# Patient Record
Sex: MALE | Race: WHITE | NOT HISPANIC OR LATINO | Employment: OTHER | URBAN - METROPOLITAN AREA
[De-identification: names, ages, dates, MRNs, and addresses within clinical notes are randomized per-mention and may not be internally consistent; named-entity substitution may affect disease eponyms.]

---

## 2017-09-21 ENCOUNTER — HOSPITAL ENCOUNTER (OUTPATIENT)
Dept: OTHER | Facility: HOSPITAL | Age: 82
Setting detail: SPECIMEN
Discharge: HOME | End: 2017-09-21
Attending: FAMILY MEDICINE | Admitting: FAMILY MEDICINE

## 2017-09-21 LAB
ALBUMIN SERPL-MCNC: 3.6 G/DL (ref 3.5–4.8)
ALBUMIN/GLOB SERPL: 1.1 {RATIO} (ref 1–1.7)
ALP SERPL-CCNC: 76 IU/L (ref 32–91)
ALT SERPL-CCNC: 20 IU/L (ref 17–63)
ANION GAP SERPL CALC-SCNC: 11.6 MMOL/L (ref 10–20)
AST SERPL-CCNC: 32 IU/L (ref 15–41)
BASOPHILS # BLD AUTO: 0 10*3/UL (ref 0–0.2)
BASOPHILS NFR BLD AUTO: 1 % (ref 0–2)
BILIRUB SERPL-MCNC: 2.3 MG/DL (ref 0.3–1.2)
BUN SERPL-MCNC: 17 MG/DL (ref 8–20)
BUN/CREAT SERPL: 15.5 (ref 6.2–20.3)
CALCIUM SERPL-MCNC: 9.4 MG/DL (ref 8.9–10.3)
CHLORIDE SERPL-SCNC: 105 MMOL/L (ref 101–111)
CHOLEST SERPL-MCNC: 128 MG/DL
CHOLEST/HDLC SERPL: 2.9 {RATIO}
CONV CO2: 28 MMOL/L (ref 22–32)
CONV LDL CHOLESTEROL DIRECT: 72 MG/DL (ref 0–100)
CONV TOTAL PROTEIN: 6.8 G/DL (ref 6.1–7.9)
CREAT UR-MCNC: 1.1 MG/DL (ref 0.7–1.2)
DIFFERENTIAL METHOD BLD: (no result)
EOSINOPHIL # BLD AUTO: 0.1 10*3/UL (ref 0–0.3)
EOSINOPHIL # BLD AUTO: 2 % (ref 0–3)
ERYTHROCYTE [DISTWIDTH] IN BLOOD BY AUTOMATED COUNT: 15 % (ref 11.5–14.5)
GLOBULIN UR ELPH-MCNC: 3.2 G/DL (ref 2.5–3.8)
GLUCOSE SERPL-MCNC: 84 MG/DL (ref 65–99)
HCT VFR BLD AUTO: 44.7 % (ref 40–54)
HDLC SERPL-MCNC: 45 MG/DL
HGB BLD-MCNC: 14.8 G/DL (ref 14–18)
LDLC/HDLC SERPL: 1.6 {RATIO}
LIPID INTERPRETATION: NORMAL
LYMPHOCYTES # BLD AUTO: 1.7 10*3/UL (ref 0.8–4.8)
LYMPHOCYTES NFR BLD AUTO: 34 % (ref 18–42)
MCH RBC QN AUTO: 30.1 PG (ref 26–32)
MCHC RBC AUTO-ENTMCNC: 33 G/DL (ref 32–36)
MCV RBC AUTO: 91.3 FL (ref 80–94)
MONOCYTES # BLD AUTO: 0.5 10*3/UL (ref 0.1–1.3)
MONOCYTES NFR BLD AUTO: 10 % (ref 2–11)
NEUTROPHILS # BLD AUTO: 2.7 10*3/UL (ref 2.3–8.6)
NEUTROPHILS NFR BLD AUTO: 53 % (ref 50–75)
NRBC BLD AUTO-RTO: 0 /100{WBCS}
NRBC/RBC NFR BLD MANUAL: 0 10*3/UL
PLATELET # BLD AUTO: 150 10*3/UL (ref 150–450)
PMV BLD AUTO: 8.2 FL (ref 7.4–10.4)
POTASSIUM SERPL-SCNC: 4.6 MMOL/L (ref 3.6–5.1)
RBC # BLD AUTO: 4.9 10*6/UL (ref 4.6–6)
SODIUM SERPL-SCNC: 140 MMOL/L (ref 136–144)
TRIGL SERPL-MCNC: 43 MG/DL
TSH SERPL-ACNC: 3.93 UIU/ML (ref 0.34–5.6)
VLDLC SERPL CALC-MCNC: 10.9 MG/DL
WBC # BLD AUTO: 5 10*3/UL (ref 4.5–11.5)

## 2018-03-13 ENCOUNTER — HOSPITAL ENCOUNTER (OUTPATIENT)
Dept: OTHER | Facility: HOSPITAL | Age: 83
Setting detail: SPECIMEN
Discharge: HOME | End: 2018-03-13
Attending: FAMILY MEDICINE | Admitting: FAMILY MEDICINE

## 2018-08-28 ENCOUNTER — HOSPITAL ENCOUNTER (OUTPATIENT)
Dept: OTHER | Facility: HOSPITAL | Age: 83
Setting detail: SPECIMEN
Discharge: HOME | End: 2018-08-28
Attending: FAMILY MEDICINE | Admitting: FAMILY MEDICINE

## 2018-10-17 ENCOUNTER — HOSPITAL ENCOUNTER (OUTPATIENT)
Dept: OTHER | Facility: HOSPITAL | Age: 83
Setting detail: SPECIMEN
Discharge: HOME | End: 2018-10-17
Attending: FAMILY MEDICINE | Admitting: FAMILY MEDICINE

## 2018-10-17 LAB
ALBUMIN SERPL-MCNC: 3.5 G/DL (ref 3.5–4.8)
ALBUMIN/GLOB SERPL: 1 {RATIO} (ref 1–1.7)
ALP SERPL-CCNC: 97 IU/L (ref 32–91)
ALT SERPL-CCNC: 19 IU/L (ref 17–63)
ANION GAP SERPL CALC-SCNC: 13.8 MMOL/L (ref 10–20)
AST SERPL-CCNC: 32 IU/L (ref 15–41)
BASOPHILS # BLD AUTO: 0 10*3/UL (ref 0–0.2)
BASOPHILS NFR BLD AUTO: 1 % (ref 0–2)
BILIRUB SERPL-MCNC: 1.9 MG/DL (ref 0.3–1.2)
BUN SERPL-MCNC: 20 MG/DL (ref 8–20)
BUN/CREAT SERPL: 16.7 (ref 6.2–20.3)
CALCIUM SERPL-MCNC: 9.3 MG/DL (ref 8.9–10.3)
CHLORIDE SERPL-SCNC: 101 MMOL/L (ref 101–111)
CHOLEST SERPL-MCNC: 113 MG/DL
CHOLEST/HDLC SERPL: 2.6 {RATIO}
CONV CO2: 28 MMOL/L (ref 22–32)
CONV LDL CHOLESTEROL DIRECT: 61 MG/DL (ref 0–100)
CONV TOTAL PROTEIN: 6.9 G/DL (ref 6.1–7.9)
CREAT UR-MCNC: 1.2 MG/DL (ref 0.7–1.2)
DIFFERENTIAL METHOD BLD: (no result)
EOSINOPHIL # BLD AUTO: 0.1 10*3/UL (ref 0–0.3)
EOSINOPHIL # BLD AUTO: 1 % (ref 0–3)
ERYTHROCYTE [DISTWIDTH] IN BLOOD BY AUTOMATED COUNT: 15 % (ref 11.5–14.5)
GLOBULIN UR ELPH-MCNC: 3.4 G/DL (ref 2.5–3.8)
GLUCOSE SERPL-MCNC: 92 MG/DL (ref 65–99)
HCT VFR BLD AUTO: 41.5 % (ref 40–54)
HDLC SERPL-MCNC: 44 MG/DL
HGB BLD-MCNC: 14.1 G/DL (ref 14–18)
LDLC/HDLC SERPL: 1.4 {RATIO}
LIPID INTERPRETATION: NORMAL
LYMPHOCYTES # BLD AUTO: 1.5 10*3/UL (ref 0.8–4.8)
LYMPHOCYTES NFR BLD AUTO: 31 % (ref 18–42)
MCH RBC QN AUTO: 31 PG (ref 26–32)
MCHC RBC AUTO-ENTMCNC: 34 G/DL (ref 32–36)
MCV RBC AUTO: 91 FL (ref 80–94)
MONOCYTES # BLD AUTO: 0.5 10*3/UL (ref 0.1–1.3)
MONOCYTES NFR BLD AUTO: 11 % (ref 2–11)
NEUTROPHILS # BLD AUTO: 2.7 10*3/UL (ref 2.3–8.6)
NEUTROPHILS NFR BLD AUTO: 56 % (ref 50–75)
NRBC BLD AUTO-RTO: 0 /100{WBCS}
NRBC/RBC NFR BLD MANUAL: 0 10*3/UL
PLATELET # BLD AUTO: 165 10*3/UL (ref 150–450)
PMV BLD AUTO: 8.1 FL (ref 7.4–10.4)
POTASSIUM SERPL-SCNC: 4.8 MMOL/L (ref 3.6–5.1)
RBC # BLD AUTO: 4.57 10*6/UL (ref 4.6–6)
SODIUM SERPL-SCNC: 138 MMOL/L (ref 136–144)
TRIGL SERPL-MCNC: 37 MG/DL
TSH SERPL-ACNC: 4.86 UIU/ML (ref 0.34–5.6)
VLDLC SERPL CALC-MCNC: 7.8 MG/DL
WBC # BLD AUTO: 4.8 10*3/UL (ref 4.5–11.5)

## 2019-03-22 ENCOUNTER — HOSPITAL ENCOUNTER (OUTPATIENT)
Dept: OTHER | Facility: HOSPITAL | Age: 84
Discharge: HOME | End: 2019-03-22
Attending: NURSE PRACTITIONER | Admitting: NURSE PRACTITIONER

## 2019-07-02 ENCOUNTER — ANTICOAGULATION VISIT (OUTPATIENT)
Dept: CARDIOLOGY | Facility: CLINIC | Age: 84
End: 2019-07-02

## 2019-07-02 DIAGNOSIS — I48.91 ATRIAL FIBRILLATION, UNSPECIFIED TYPE (HCC): Primary | ICD-10-CM

## 2019-07-02 LAB — INR PPP: 2.2 (ref 0.9–1.1)

## 2019-07-02 PROCEDURE — 36416 COLLJ CAPILLARY BLOOD SPEC: CPT | Performed by: INTERNAL MEDICINE

## 2019-07-02 PROCEDURE — 85610 PROTHROMBIN TIME: CPT | Performed by: INTERNAL MEDICINE

## 2019-07-16 ENCOUNTER — ANTICOAGULATION VISIT (OUTPATIENT)
Dept: CARDIOLOGY | Facility: CLINIC | Age: 84
End: 2019-07-16

## 2019-07-16 DIAGNOSIS — I48.91 ATRIAL FIBRILLATION, UNSPECIFIED TYPE (HCC): Primary | ICD-10-CM

## 2019-07-16 LAB — INR PPP: 2.3 (ref 0.9–1.1)

## 2019-07-16 PROCEDURE — 36416 COLLJ CAPILLARY BLOOD SPEC: CPT | Performed by: INTERNAL MEDICINE

## 2019-07-16 PROCEDURE — 85610 PROTHROMBIN TIME: CPT | Performed by: INTERNAL MEDICINE

## 2019-08-20 ENCOUNTER — ANTICOAGULATION VISIT (OUTPATIENT)
Dept: CARDIOLOGY | Facility: CLINIC | Age: 84
End: 2019-08-20

## 2019-08-20 DIAGNOSIS — I48.91 ATRIAL FIBRILLATION, UNSPECIFIED TYPE (HCC): Primary | ICD-10-CM

## 2019-08-20 LAB — INR PPP: 1.7 (ref 0.9–1.1)

## 2019-08-20 PROCEDURE — 85610 PROTHROMBIN TIME: CPT | Performed by: INTERNAL MEDICINE

## 2019-08-20 PROCEDURE — 36416 COLLJ CAPILLARY BLOOD SPEC: CPT | Performed by: INTERNAL MEDICINE

## 2019-08-23 RX ORDER — FUROSEMIDE 40 MG/1
TABLET ORAL
Qty: 30 TABLET | Refills: 1 | Status: SHIPPED | OUTPATIENT
Start: 2019-08-23 | End: 2019-10-10

## 2019-09-25 ENCOUNTER — LAB REQUISITION (OUTPATIENT)
Dept: LAB | Facility: HOSPITAL | Age: 84
End: 2019-09-25

## 2019-09-25 DIAGNOSIS — Z00.00 ENCOUNTER FOR GENERAL ADULT MEDICAL EXAMINATION WITHOUT ABNORMAL FINDINGS: ICD-10-CM

## 2019-09-25 LAB
AMORPH URATE CRY URNS QL MICRO: ABNORMAL /HPF
ANION GAP SERPL CALCULATED.3IONS-SCNC: 14.3 MMOL/L (ref 5–15)
BACTERIA UR QL AUTO: ABNORMAL /HPF
BASOPHILS # BLD AUTO: 0 10*3/MM3 (ref 0–0.2)
BASOPHILS NFR BLD AUTO: 0.3 % (ref 0–1.5)
BILIRUB UR QL STRIP: ABNORMAL
BUN BLD-MCNC: 22 MG/DL (ref 8–20)
BUN/CREAT SERPL: 20 (ref 6.2–20.3)
CALCIUM SPEC-SCNC: 9.7 MG/DL (ref 8.9–10.3)
CHLORIDE SERPL-SCNC: 107 MMOL/L (ref 101–111)
CLARITY UR: ABNORMAL
CO2 SERPL-SCNC: 25 MMOL/L (ref 22–32)
COD CRY URNS QL: ABNORMAL /HPF
COLOR UR: ABNORMAL
CREAT BLD-MCNC: 1.1 MG/DL (ref 0.7–1.2)
DEPRECATED RDW RBC AUTO: 49 FL (ref 37–54)
EOSINOPHIL # BLD AUTO: 0.1 10*3/MM3 (ref 0–0.4)
EOSINOPHIL NFR BLD AUTO: 1.2 % (ref 0.3–6.2)
ERYTHROCYTE [DISTWIDTH] IN BLOOD BY AUTOMATED COUNT: 15 % (ref 12.3–15.4)
GFR SERPL CREATININE-BSD FRML MDRD: 62 ML/MIN/1.73
GLUCOSE BLD-MCNC: 99 MG/DL (ref 65–99)
GLUCOSE UR STRIP-MCNC: NEGATIVE MG/DL
HCT VFR BLD AUTO: 47.3 % (ref 37.5–51)
HGB BLD-MCNC: 16.1 G/DL (ref 13–17.7)
HGB UR QL STRIP.AUTO: ABNORMAL
HYALINE CASTS UR QL AUTO: ABNORMAL /LPF
KETONES UR QL STRIP: ABNORMAL
LEUKOCYTE ESTERASE UR QL STRIP.AUTO: ABNORMAL
LYMPHOCYTES # BLD AUTO: 2.1 10*3/MM3 (ref 0.7–3.1)
LYMPHOCYTES NFR BLD AUTO: 30.7 % (ref 19.6–45.3)
MCH RBC QN AUTO: 31.4 PG (ref 26.6–33)
MCHC RBC AUTO-ENTMCNC: 34 G/DL (ref 31.5–35.7)
MCV RBC AUTO: 92.5 FL (ref 79–97)
MONOCYTES # BLD AUTO: 0.6 10*3/MM3 (ref 0.1–0.9)
MONOCYTES NFR BLD AUTO: 9.2 % (ref 5–12)
MUCOUS THREADS URNS QL MICRO: ABNORMAL /HPF
NEUTROPHILS # BLD AUTO: 4 10*3/MM3 (ref 1.7–7)
NEUTROPHILS NFR BLD AUTO: 58.6 % (ref 42.7–76)
NITRITE UR QL STRIP: NEGATIVE
NRBC BLD AUTO-RTO: 0.1 /100 WBC (ref 0–0.2)
PH UR STRIP.AUTO: 5.5 [PH] (ref 5–8)
PLATELET # BLD AUTO: 261 10*3/MM3 (ref 140–450)
PMV BLD AUTO: 7.5 FL (ref 6–12)
POTASSIUM BLD-SCNC: 4.3 MMOL/L (ref 3.6–5.1)
PROT UR QL STRIP: ABNORMAL
RBC # BLD AUTO: 5.11 10*6/MM3 (ref 4.14–5.8)
RBC # UR: ABNORMAL /HPF
REF LAB TEST METHOD: ABNORMAL
SODIUM BLD-SCNC: 142 MMOL/L (ref 136–144)
SP GR UR STRIP: 1.02 (ref 1–1.03)
SQUAMOUS #/AREA URNS HPF: ABNORMAL /HPF
UROBILINOGEN UR QL STRIP: ABNORMAL
WBC NRBC COR # BLD: 6.9 10*3/MM3 (ref 3.4–10.8)
WBC UR QL AUTO: ABNORMAL /HPF

## 2019-09-25 PROCEDURE — 81001 URINALYSIS AUTO W/SCOPE: CPT

## 2019-09-25 PROCEDURE — 80048 BASIC METABOLIC PNL TOTAL CA: CPT

## 2019-09-25 PROCEDURE — 85025 COMPLETE CBC W/AUTO DIFF WBC: CPT

## 2019-09-26 ENCOUNTER — LAB REQUISITION (OUTPATIENT)
Dept: LAB | Facility: HOSPITAL | Age: 84
End: 2019-09-26

## 2019-09-26 DIAGNOSIS — G81.90 HEMIPLEGIA (HCC): ICD-10-CM

## 2019-09-26 DIAGNOSIS — I67.9 CEREBROVASCULAR DISEASE: ICD-10-CM

## 2019-09-26 LAB
ANION GAP SERPL CALCULATED.3IONS-SCNC: 12.3 MMOL/L (ref 5–15)
BASOPHILS # BLD AUTO: 0 10*3/MM3 (ref 0–0.2)
BASOPHILS NFR BLD AUTO: 0.4 % (ref 0–1.5)
BUN BLD-MCNC: 21 MG/DL (ref 8–20)
BUN/CREAT SERPL: 17.5 (ref 6.2–20.3)
CALCIUM SPEC-SCNC: 9 MG/DL (ref 8.9–10.3)
CHLORIDE SERPL-SCNC: 105 MMOL/L (ref 101–111)
CO2 SERPL-SCNC: 28 MMOL/L (ref 22–32)
CREAT BLD-MCNC: 1.2 MG/DL (ref 0.7–1.2)
DEPRECATED RDW RBC AUTO: 47.3 FL (ref 37–54)
EOSINOPHIL # BLD AUTO: 0.1 10*3/MM3 (ref 0–0.4)
EOSINOPHIL NFR BLD AUTO: 1.7 % (ref 0.3–6.2)
ERYTHROCYTE [DISTWIDTH] IN BLOOD BY AUTOMATED COUNT: 14.8 % (ref 12.3–15.4)
GFR SERPL CREATININE-BSD FRML MDRD: 56 ML/MIN/1.73
GLUCOSE BLD-MCNC: 101 MG/DL (ref 65–99)
HCT VFR BLD AUTO: 42.3 % (ref 37.5–51)
HGB BLD-MCNC: 14.2 G/DL (ref 13–17.7)
LYMPHOCYTES # BLD AUTO: 1.2 10*3/MM3 (ref 0.7–3.1)
LYMPHOCYTES NFR BLD AUTO: 22.2 % (ref 19.6–45.3)
MCH RBC QN AUTO: 30.8 PG (ref 26.6–33)
MCHC RBC AUTO-ENTMCNC: 33.5 G/DL (ref 31.5–35.7)
MCV RBC AUTO: 92.1 FL (ref 79–97)
MONOCYTES # BLD AUTO: 0.5 10*3/MM3 (ref 0.1–0.9)
MONOCYTES NFR BLD AUTO: 9.6 % (ref 5–12)
NEUTROPHILS # BLD AUTO: 3.5 10*3/MM3 (ref 1.7–7)
NEUTROPHILS NFR BLD AUTO: 66.1 % (ref 42.7–76)
NRBC BLD AUTO-RTO: 0.1 /100 WBC (ref 0–0.2)
PLATELET # BLD AUTO: 237 10*3/MM3 (ref 140–450)
PMV BLD AUTO: 7.3 FL (ref 6–12)
POTASSIUM BLD-SCNC: 4.3 MMOL/L (ref 3.6–5.1)
RBC # BLD AUTO: 4.59 10*6/MM3 (ref 4.14–5.8)
SODIUM BLD-SCNC: 141 MMOL/L (ref 136–144)
WBC NRBC COR # BLD: 5.3 10*3/MM3 (ref 3.4–10.8)

## 2019-09-26 PROCEDURE — 85025 COMPLETE CBC W/AUTO DIFF WBC: CPT

## 2019-09-26 PROCEDURE — 80048 BASIC METABOLIC PNL TOTAL CA: CPT

## 2019-09-27 ENCOUNTER — LAB REQUISITION (OUTPATIENT)
Dept: LAB | Facility: HOSPITAL | Age: 84
End: 2019-09-27

## 2019-09-27 DIAGNOSIS — I67.9 CEREBROVASCULAR DISEASE: ICD-10-CM

## 2019-09-27 LAB
ANION GAP SERPL CALCULATED.3IONS-SCNC: 12.7 MMOL/L (ref 5–15)
BUN BLD-MCNC: 45 MG/DL (ref 8–20)
BUN/CREAT SERPL: 25 (ref 6.2–20.3)
CALCIUM SPEC-SCNC: 8.5 MG/DL (ref 8.9–10.3)
CHLORIDE SERPL-SCNC: 107 MMOL/L (ref 101–111)
CO2 SERPL-SCNC: 21 MMOL/L (ref 22–32)
CREAT BLD-MCNC: 1.8 MG/DL (ref 0.7–1.2)
GFR SERPL CREATININE-BSD FRML MDRD: 35 ML/MIN/1.73
GLUCOSE BLD-MCNC: 129 MG/DL (ref 65–99)
POTASSIUM BLD-SCNC: 4.7 MMOL/L (ref 3.6–5.1)
SODIUM BLD-SCNC: 136 MMOL/L (ref 136–144)

## 2019-09-27 PROCEDURE — 80048 BASIC METABOLIC PNL TOTAL CA: CPT

## 2019-09-28 ENCOUNTER — LAB REQUISITION (OUTPATIENT)
Dept: LAB | Facility: HOSPITAL | Age: 84
End: 2019-09-28

## 2019-09-28 DIAGNOSIS — I63.9 CEREBRAL INFARCTION (HCC): ICD-10-CM

## 2019-09-28 LAB
ALBUMIN SERPL-MCNC: 2.6 G/DL (ref 3.5–4.8)
ALBUMIN/GLOB SERPL: 0.8 G/DL (ref 1–1.7)
ALP SERPL-CCNC: 73 U/L (ref 32–91)
ALT SERPL W P-5'-P-CCNC: 19 U/L (ref 17–63)
ANION GAP SERPL CALCULATED.3IONS-SCNC: 13.1 MMOL/L (ref 5–15)
AST SERPL-CCNC: 30 U/L (ref 15–41)
BILIRUB SERPL-MCNC: 2.6 MG/DL (ref 0.3–1.2)
BUN BLD-MCNC: 22 MG/DL (ref 8–20)
BUN/CREAT SERPL: 16.9 (ref 6.2–20.3)
CALCIUM SPEC-SCNC: 9.2 MG/DL (ref 8.9–10.3)
CHLORIDE SERPL-SCNC: 102 MMOL/L (ref 101–111)
CO2 SERPL-SCNC: 28 MMOL/L (ref 22–32)
CREAT BLD-MCNC: 1.3 MG/DL (ref 0.7–1.2)
GFR SERPL CREATININE-BSD FRML MDRD: 51 ML/MIN/1.73
GLOBULIN UR ELPH-MCNC: 3.4 GM/DL (ref 2.5–3.8)
GLUCOSE BLD-MCNC: 104 MG/DL (ref 65–99)
POTASSIUM BLD-SCNC: 4.1 MMOL/L (ref 3.6–5.1)
PROT SERPL-MCNC: 6 G/DL (ref 6.1–7.9)
SODIUM BLD-SCNC: 139 MMOL/L (ref 136–144)

## 2019-09-28 PROCEDURE — 80053 COMPREHEN METABOLIC PANEL: CPT | Performed by: INTERNAL MEDICINE

## 2019-09-29 ENCOUNTER — LAB REQUISITION (OUTPATIENT)
Dept: LAB | Facility: HOSPITAL | Age: 84
End: 2019-09-29

## 2019-09-29 DIAGNOSIS — I67.9 CEREBROVASCULAR DISEASE: ICD-10-CM

## 2019-09-29 LAB
BASOPHILS # BLD AUTO: 0 10*3/MM3 (ref 0–0.2)
BASOPHILS NFR BLD AUTO: 0.2 % (ref 0–1.5)
DEPRECATED RDW RBC AUTO: 47.7 FL (ref 37–54)
EOSINOPHIL # BLD AUTO: 0.1 10*3/MM3 (ref 0–0.4)
EOSINOPHIL NFR BLD AUTO: 0.4 % (ref 0.3–6.2)
ERYTHROCYTE [DISTWIDTH] IN BLOOD BY AUTOMATED COUNT: 14.9 % (ref 12.3–15.4)
HCT VFR BLD AUTO: 42.5 % (ref 37.5–51)
HGB BLD-MCNC: 14.1 G/DL (ref 13–17.7)
LYMPHOCYTES # BLD AUTO: 1.4 10*3/MM3 (ref 0.7–3.1)
LYMPHOCYTES NFR BLD AUTO: 8.8 % (ref 19.6–45.3)
MCH RBC QN AUTO: 30.6 PG (ref 26.6–33)
MCHC RBC AUTO-ENTMCNC: 33.3 G/DL (ref 31.5–35.7)
MCV RBC AUTO: 91.9 FL (ref 79–97)
MONOCYTES # BLD AUTO: 1 10*3/MM3 (ref 0.1–0.9)
MONOCYTES NFR BLD AUTO: 6.3 % (ref 5–12)
NEUTROPHILS # BLD AUTO: 13.8 10*3/MM3 (ref 1.7–7)
NEUTROPHILS NFR BLD AUTO: 84.3 % (ref 42.7–76)
NRBC BLD AUTO-RTO: 0 /100 WBC (ref 0–0.2)
PLATELET # BLD AUTO: 199 10*3/MM3 (ref 140–450)
PMV BLD AUTO: 8 FL (ref 6–12)
RBC # BLD AUTO: 4.63 10*6/MM3 (ref 4.14–5.8)
WBC NRBC COR # BLD: 16.3 10*3/MM3 (ref 3.4–10.8)

## 2019-09-29 PROCEDURE — 85025 COMPLETE CBC W/AUTO DIFF WBC: CPT | Performed by: INTERNAL MEDICINE

## 2019-10-01 ENCOUNTER — LAB REQUISITION (OUTPATIENT)
Dept: LAB | Facility: HOSPITAL | Age: 84
End: 2019-10-01

## 2019-10-01 DIAGNOSIS — I63.9 CEREBRAL INFARCTION, UNSPECIFIED (HCC): ICD-10-CM

## 2019-10-01 LAB
ANION GAP SERPL CALCULATED.3IONS-SCNC: 11 MMOL/L (ref 5–15)
BASOPHILS # BLD AUTO: 0 10*3/MM3 (ref 0–0.2)
BASOPHILS NFR BLD AUTO: 0.3 % (ref 0–1.5)
BUN BLD-MCNC: 23 MG/DL (ref 8–20)
BUN/CREAT SERPL: 20.9 (ref 6.2–20.3)
CALCIUM SPEC-SCNC: 9 MG/DL (ref 8.9–10.3)
CHLORIDE SERPL-SCNC: 105 MMOL/L (ref 101–111)
CO2 SERPL-SCNC: 26 MMOL/L (ref 22–32)
CREAT BLD-MCNC: 1.1 MG/DL (ref 0.7–1.2)
DEPRECATED RDW RBC AUTO: 48.6 FL (ref 37–54)
EOSINOPHIL # BLD AUTO: 0.1 10*3/MM3 (ref 0–0.4)
EOSINOPHIL NFR BLD AUTO: 1.2 % (ref 0.3–6.2)
ERYTHROCYTE [DISTWIDTH] IN BLOOD BY AUTOMATED COUNT: 15 % (ref 12.3–15.4)
GFR SERPL CREATININE-BSD FRML MDRD: 62 ML/MIN/1.73
GLUCOSE BLD-MCNC: 99 MG/DL (ref 65–99)
HCT VFR BLD AUTO: 42.2 % (ref 37.5–51)
HGB BLD-MCNC: 14 G/DL (ref 13–17.7)
LYMPHOCYTES # BLD AUTO: 1 10*3/MM3 (ref 0.7–3.1)
LYMPHOCYTES NFR BLD AUTO: 13.8 % (ref 19.6–45.3)
MCH RBC QN AUTO: 30.3 PG (ref 26.6–33)
MCHC RBC AUTO-ENTMCNC: 33.1 G/DL (ref 31.5–35.7)
MCV RBC AUTO: 91.6 FL (ref 79–97)
MONOCYTES # BLD AUTO: 0.8 10*3/MM3 (ref 0.1–0.9)
MONOCYTES NFR BLD AUTO: 10.7 % (ref 5–12)
NEUTROPHILS # BLD AUTO: 5.2 10*3/MM3 (ref 1.7–7)
NEUTROPHILS NFR BLD AUTO: 74 % (ref 42.7–76)
NRBC BLD AUTO-RTO: 0.1 /100 WBC (ref 0–0.2)
PLATELET # BLD AUTO: 229 10*3/MM3 (ref 140–450)
PMV BLD AUTO: 7.8 FL (ref 6–12)
POTASSIUM BLD-SCNC: 4 MMOL/L (ref 3.6–5.1)
RBC # BLD AUTO: 4.6 10*6/MM3 (ref 4.14–5.8)
SODIUM BLD-SCNC: 138 MMOL/L (ref 136–144)
WBC NRBC COR # BLD: 7.1 10*3/MM3 (ref 3.4–10.8)

## 2019-10-01 PROCEDURE — 85025 COMPLETE CBC W/AUTO DIFF WBC: CPT | Performed by: INTERNAL MEDICINE

## 2019-10-01 PROCEDURE — 80048 BASIC METABOLIC PNL TOTAL CA: CPT | Performed by: INTERNAL MEDICINE

## 2019-10-04 ENCOUNTER — LAB REQUISITION (OUTPATIENT)
Dept: LAB | Facility: HOSPITAL | Age: 84
End: 2019-10-04

## 2019-10-04 LAB
ANION GAP SERPL CALCULATED.3IONS-SCNC: 17.2 MMOL/L (ref 5–15)
BASOPHILS # BLD AUTO: 0 10*3/MM3 (ref 0–0.2)
BASOPHILS NFR BLD AUTO: 0.5 % (ref 0–1.5)
BUN BLD-MCNC: 21 MG/DL (ref 8–20)
BUN/CREAT SERPL: 17.5 (ref 6.2–20.3)
CALCIUM SPEC-SCNC: 10.1 MG/DL (ref 8.9–10.3)
CHLORIDE SERPL-SCNC: 103 MMOL/L (ref 101–111)
CO2 SERPL-SCNC: 26 MMOL/L (ref 22–32)
CREAT BLD-MCNC: 1.2 MG/DL (ref 0.7–1.2)
DEPRECATED RDW RBC AUTO: 49.9 FL (ref 37–54)
EOSINOPHIL # BLD AUTO: 0 10*3/MM3 (ref 0–0.4)
EOSINOPHIL NFR BLD AUTO: 0.6 % (ref 0.3–6.2)
ERYTHROCYTE [DISTWIDTH] IN BLOOD BY AUTOMATED COUNT: 15.3 % (ref 12.3–15.4)
GFR SERPL CREATININE-BSD FRML MDRD: 56 ML/MIN/1.73
GLUCOSE BLD-MCNC: 117 MG/DL (ref 65–99)
HCT VFR BLD AUTO: 50.7 % (ref 37.5–51)
HGB BLD-MCNC: 16.9 G/DL (ref 13–17.7)
LYMPHOCYTES # BLD AUTO: 1.3 10*3/MM3 (ref 0.7–3.1)
LYMPHOCYTES NFR BLD AUTO: 16.9 % (ref 19.6–45.3)
MCH RBC QN AUTO: 31.2 PG (ref 26.6–33)
MCHC RBC AUTO-ENTMCNC: 33.3 G/DL (ref 31.5–35.7)
MCV RBC AUTO: 93.7 FL (ref 79–97)
MONOCYTES # BLD AUTO: 0.7 10*3/MM3 (ref 0.1–0.9)
MONOCYTES NFR BLD AUTO: 9.6 % (ref 5–12)
NEUTROPHILS # BLD AUTO: 5.6 10*3/MM3 (ref 1.7–7)
NEUTROPHILS NFR BLD AUTO: 72.4 % (ref 42.7–76)
NRBC BLD AUTO-RTO: 0.3 /100 WBC (ref 0–0.2)
PLATELET # BLD AUTO: 274 10*3/MM3 (ref 140–450)
PMV BLD AUTO: 7.8 FL (ref 6–12)
POTASSIUM BLD-SCNC: 5.2 MMOL/L (ref 3.6–5.1)
RBC # BLD AUTO: 5.42 10*6/MM3 (ref 4.14–5.8)
SODIUM BLD-SCNC: 141 MMOL/L (ref 136–144)
WBC NRBC COR # BLD: 7.7 10*3/MM3 (ref 3.4–10.8)

## 2019-10-04 PROCEDURE — 85025 COMPLETE CBC W/AUTO DIFF WBC: CPT

## 2019-10-04 PROCEDURE — 80048 BASIC METABOLIC PNL TOTAL CA: CPT

## 2019-10-07 ENCOUNTER — LAB REQUISITION (OUTPATIENT)
Dept: LAB | Facility: HOSPITAL | Age: 84
End: 2019-10-07

## 2019-10-07 DIAGNOSIS — Z00.00 ENCOUNTER FOR GENERAL ADULT MEDICAL EXAMINATION WITHOUT ABNORMAL FINDINGS: ICD-10-CM

## 2019-10-07 LAB
ANION GAP SERPL CALCULATED.3IONS-SCNC: 11.2 MMOL/L (ref 5–15)
BASOPHILS # BLD AUTO: 0 10*3/MM3 (ref 0–0.2)
BASOPHILS NFR BLD AUTO: 0.5 % (ref 0–1.5)
BUN BLD-MCNC: 23 MG/DL (ref 8–20)
BUN/CREAT SERPL: 19.2 (ref 6.2–20.3)
CALCIUM SPEC-SCNC: 9.5 MG/DL (ref 8.9–10.3)
CHLORIDE SERPL-SCNC: 110 MMOL/L (ref 101–111)
CO2 SERPL-SCNC: 28 MMOL/L (ref 22–32)
CREAT BLD-MCNC: 1.2 MG/DL (ref 0.7–1.2)
DEPRECATED RDW RBC AUTO: 49 FL (ref 37–54)
EOSINOPHIL # BLD AUTO: 0.1 10*3/MM3 (ref 0–0.4)
EOSINOPHIL NFR BLD AUTO: 1.5 % (ref 0.3–6.2)
ERYTHROCYTE [DISTWIDTH] IN BLOOD BY AUTOMATED COUNT: 15.1 % (ref 12.3–15.4)
GFR SERPL CREATININE-BSD FRML MDRD: 56 ML/MIN/1.73
GLUCOSE BLD-MCNC: 125 MG/DL (ref 65–99)
HCT VFR BLD AUTO: 45.5 % (ref 37.5–51)
HGB BLD-MCNC: 15.1 G/DL (ref 13–17.7)
LYMPHOCYTES # BLD AUTO: 1.2 10*3/MM3 (ref 0.7–3.1)
LYMPHOCYTES NFR BLD AUTO: 21 % (ref 19.6–45.3)
MCH RBC QN AUTO: 30.7 PG (ref 26.6–33)
MCHC RBC AUTO-ENTMCNC: 33.2 G/DL (ref 31.5–35.7)
MCV RBC AUTO: 92.6 FL (ref 79–97)
MONOCYTES # BLD AUTO: 0.5 10*3/MM3 (ref 0.1–0.9)
MONOCYTES NFR BLD AUTO: 7.9 % (ref 5–12)
NEUTROPHILS # BLD AUTO: 4.1 10*3/MM3 (ref 1.7–7)
NEUTROPHILS NFR BLD AUTO: 69.1 % (ref 42.7–76)
NRBC BLD AUTO-RTO: 0.1 /100 WBC (ref 0–0.2)
PLATELET # BLD AUTO: 219 10*3/MM3 (ref 140–450)
PMV BLD AUTO: 7.8 FL (ref 6–12)
POTASSIUM BLD-SCNC: 4.2 MMOL/L (ref 3.6–5.1)
RBC # BLD AUTO: 4.91 10*6/MM3 (ref 4.14–5.8)
SODIUM BLD-SCNC: 145 MMOL/L (ref 136–144)
WBC NRBC COR # BLD: 5.9 10*3/MM3 (ref 3.4–10.8)

## 2019-10-07 PROCEDURE — 80048 BASIC METABOLIC PNL TOTAL CA: CPT

## 2019-10-07 PROCEDURE — 85025 COMPLETE CBC W/AUTO DIFF WBC: CPT

## 2019-10-08 ENCOUNTER — LAB REQUISITION (OUTPATIENT)
Dept: LAB | Facility: HOSPITAL | Age: 84
End: 2019-10-08

## 2019-10-08 DIAGNOSIS — I67.9 CEREBROVASCULAR DISEASE, UNSPECIFIED: ICD-10-CM

## 2019-10-08 LAB
ANION GAP SERPL CALCULATED.3IONS-SCNC: 15.4 MMOL/L (ref 5–15)
BUN BLD-MCNC: 23 MG/DL (ref 8–20)
BUN/CREAT SERPL: 19.2 (ref 6.2–20.3)
CALCIUM SPEC-SCNC: 9.8 MG/DL (ref 8.9–10.3)
CHLORIDE SERPL-SCNC: 109 MMOL/L (ref 101–111)
CO2 SERPL-SCNC: 26 MMOL/L (ref 22–32)
CREAT BLD-MCNC: 1.2 MG/DL (ref 0.7–1.2)
GFR SERPL CREATININE-BSD FRML MDRD: 56 ML/MIN/1.73
GLUCOSE BLD-MCNC: 98 MG/DL (ref 65–99)
POTASSIUM BLD-SCNC: 4.4 MMOL/L (ref 3.6–5.1)
SODIUM BLD-SCNC: 146 MMOL/L (ref 136–144)

## 2019-10-08 PROCEDURE — 80048 BASIC METABOLIC PNL TOTAL CA: CPT

## 2019-10-09 ENCOUNTER — LAB REQUISITION (OUTPATIENT)
Dept: LAB | Facility: HOSPITAL | Age: 84
End: 2019-10-09

## 2019-10-09 DIAGNOSIS — Z00.00 ENCOUNTER FOR GENERAL ADULT MEDICAL EXAMINATION WITHOUT ABNORMAL FINDINGS: ICD-10-CM

## 2019-10-09 LAB
ANION GAP SERPL CALCULATED.3IONS-SCNC: 17.6 MMOL/L (ref 5–15)
BUN BLD-MCNC: 29 MG/DL (ref 8–20)
BUN/CREAT SERPL: 20.7 (ref 6.2–20.3)
CALCIUM SPEC-SCNC: 9.8 MG/DL (ref 8.9–10.3)
CHLORIDE SERPL-SCNC: 110 MMOL/L (ref 101–111)
CO2 SERPL-SCNC: 22 MMOL/L (ref 22–32)
CREAT BLD-MCNC: 1.4 MG/DL (ref 0.7–1.2)
GFR SERPL CREATININE-BSD FRML MDRD: 47 ML/MIN/1.73
GLUCOSE BLD-MCNC: 126 MG/DL (ref 65–99)
POTASSIUM BLD-SCNC: 4.6 MMOL/L (ref 3.6–5.1)
SODIUM BLD-SCNC: 145 MMOL/L (ref 136–144)

## 2019-10-09 PROCEDURE — 80048 BASIC METABOLIC PNL TOTAL CA: CPT

## 2019-10-10 ENCOUNTER — APPOINTMENT (OUTPATIENT)
Dept: GENERAL RADIOLOGY | Facility: HOSPITAL | Age: 84
End: 2019-10-10

## 2019-10-10 ENCOUNTER — TELEPHONE (OUTPATIENT)
Dept: FAMILY MEDICINE CLINIC | Facility: CLINIC | Age: 84
End: 2019-10-10

## 2019-10-10 ENCOUNTER — INPATIENT HOSPITAL (OUTPATIENT)
Dept: URBAN - METROPOLITAN AREA HOSPITAL 84 | Facility: HOSPITAL | Age: 84
End: 2019-10-10
Payer: COMMERCIAL

## 2019-10-10 ENCOUNTER — APPOINTMENT (OUTPATIENT)
Dept: CT IMAGING | Facility: HOSPITAL | Age: 84
End: 2019-10-10

## 2019-10-10 ENCOUNTER — HOSPITAL ENCOUNTER (INPATIENT)
Facility: HOSPITAL | Age: 84
LOS: 11 days | Discharge: NURSING, REHAB OR LONG-TERM CARE | End: 2019-10-21
Attending: EMERGENCY MEDICINE | Admitting: INTERNAL MEDICINE

## 2019-10-10 DIAGNOSIS — R13.10 DYSPHAGIA, UNSPECIFIED TYPE: Primary | ICD-10-CM

## 2019-10-10 DIAGNOSIS — R13.10 DYSPHAGIA, UNSPECIFIED: ICD-10-CM

## 2019-10-10 DIAGNOSIS — R13.12 OROPHARYNGEAL DYSPHAGIA: ICD-10-CM

## 2019-10-10 DIAGNOSIS — R63.4 ABNORMAL WEIGHT LOSS: ICD-10-CM

## 2019-10-10 PROBLEM — E78.5 DYSLIPIDEMIA: Status: ACTIVE | Noted: 2019-10-10

## 2019-10-10 PROBLEM — N40.0 BENIGN PROSTATIC HYPERPLASIA: Status: ACTIVE | Noted: 2017-01-17

## 2019-10-10 PROBLEM — M19.90 DEGENERATIVE ARTHRITIS: Chronic | Status: ACTIVE | Noted: 2017-09-14

## 2019-10-10 PROBLEM — C76.0: Status: ACTIVE | Noted: 2018-04-23

## 2019-10-10 PROBLEM — C76.0: Chronic | Status: ACTIVE | Noted: 2018-04-23

## 2019-10-10 PROBLEM — I48.20 ATRIAL FIBRILLATION, CHRONIC (HCC): Chronic | Status: ACTIVE | Noted: 2019-10-10

## 2019-10-10 PROBLEM — M19.90 DEGENERATIVE ARTHRITIS: Status: ACTIVE | Noted: 2017-09-14

## 2019-10-10 PROBLEM — I25.10 CORONARY ARTERY DISEASE: Chronic | Status: ACTIVE | Noted: 2019-10-10

## 2019-10-10 PROBLEM — I48.20 ATRIAL FIBRILLATION, CHRONIC (HCC): Status: ACTIVE | Noted: 2019-10-10

## 2019-10-10 PROBLEM — N40.0 BENIGN PROSTATIC HYPERPLASIA: Chronic | Status: ACTIVE | Noted: 2017-01-17

## 2019-10-10 PROBLEM — B02.9 SHINGLES: Status: ACTIVE | Noted: 2017-09-25

## 2019-10-10 PROBLEM — E78.5 DYSLIPIDEMIA: Chronic | Status: ACTIVE | Noted: 2019-10-10

## 2019-10-10 PROBLEM — I25.10 CORONARY ARTERY DISEASE: Status: ACTIVE | Noted: 2019-10-10

## 2019-10-10 LAB
ALBUMIN SERPL-MCNC: 3.1 G/DL (ref 3.5–4.8)
ALBUMIN/GLOB SERPL: 0.8 G/DL (ref 1–1.7)
ALP SERPL-CCNC: 81 U/L (ref 32–91)
ALT SERPL W P-5'-P-CCNC: 25 U/L (ref 17–63)
ANION GAP SERPL CALCULATED.3IONS-SCNC: 12.6 MMOL/L (ref 5–15)
AST SERPL-CCNC: 31 U/L (ref 15–41)
BASOPHILS # BLD AUTO: 0 10*3/MM3 (ref 0–0.2)
BASOPHILS NFR BLD AUTO: 0.3 % (ref 0–1.5)
BILIRUB SERPL-MCNC: 2.6 MG/DL (ref 0.3–1.2)
BUN BLD-MCNC: 29 MG/DL (ref 8–20)
BUN/CREAT SERPL: 22.3 (ref 6.2–20.3)
CALCIUM SPEC-SCNC: 10.3 MG/DL (ref 8.9–10.3)
CHLORIDE SERPL-SCNC: 110 MMOL/L (ref 101–111)
CO2 SERPL-SCNC: 30 MMOL/L (ref 22–32)
CREAT BLD-MCNC: 1.3 MG/DL (ref 0.7–1.2)
DEPRECATED RDW RBC AUTO: 49.9 FL (ref 37–54)
EOSINOPHIL # BLD AUTO: 0 10*3/MM3 (ref 0–0.4)
EOSINOPHIL NFR BLD AUTO: 0.2 % (ref 0.3–6.2)
ERYTHROCYTE [DISTWIDTH] IN BLOOD BY AUTOMATED COUNT: 15.5 % (ref 12.3–15.4)
GFR SERPL CREATININE-BSD FRML MDRD: 51 ML/MIN/1.73
GLOBULIN UR ELPH-MCNC: 4.1 GM/DL (ref 2.5–3.8)
GLUCOSE BLD-MCNC: 109 MG/DL (ref 65–99)
HCT VFR BLD AUTO: 49.5 % (ref 37.5–51)
HGB BLD-MCNC: 16.7 G/DL (ref 13–17.7)
INR PPP: 1.45 (ref 0.9–1.1)
LYMPHOCYTES # BLD AUTO: 0.9 10*3/MM3 (ref 0.7–3.1)
LYMPHOCYTES NFR BLD AUTO: 9.3 % (ref 19.6–45.3)
MCH RBC QN AUTO: 30.9 PG (ref 26.6–33)
MCHC RBC AUTO-ENTMCNC: 33.6 G/DL (ref 31.5–35.7)
MCV RBC AUTO: 91.9 FL (ref 79–97)
MONOCYTES # BLD AUTO: 0.8 10*3/MM3 (ref 0.1–0.9)
MONOCYTES NFR BLD AUTO: 7.6 % (ref 5–12)
NEUTROPHILS # BLD AUTO: 8.4 10*3/MM3 (ref 1.7–7)
NEUTROPHILS NFR BLD AUTO: 82.6 % (ref 42.7–76)
NRBC BLD AUTO-RTO: 0 /100 WBC (ref 0–0.2)
PLATELET # BLD AUTO: 193 10*3/MM3 (ref 140–450)
PMV BLD AUTO: 7.8 FL (ref 6–12)
POTASSIUM BLD-SCNC: 4.6 MMOL/L (ref 3.6–5.1)
PROT SERPL-MCNC: 7.2 G/DL (ref 6.1–7.9)
PROTHROMBIN TIME: 14.3 SECONDS (ref 9.6–11.7)
RBC # BLD AUTO: 5.39 10*6/MM3 (ref 4.14–5.8)
SODIUM BLD-SCNC: 148 MMOL/L (ref 136–144)
WBC NRBC COR # BLD: 10.2 10*3/MM3 (ref 3.4–10.8)

## 2019-10-10 PROCEDURE — 99222 1ST HOSP IP/OBS MODERATE 55: CPT | Performed by: NURSE PRACTITIONER

## 2019-10-10 PROCEDURE — 25010000002 ENOXAPARIN PER 10 MG: Performed by: NURSE PRACTITIONER

## 2019-10-10 PROCEDURE — 71046 X-RAY EXAM CHEST 2 VIEWS: CPT

## 2019-10-10 PROCEDURE — 85025 COMPLETE CBC W/AUTO DIFF WBC: CPT | Performed by: INTERNAL MEDICINE

## 2019-10-10 PROCEDURE — 80053 COMPREHEN METABOLIC PANEL: CPT | Performed by: INTERNAL MEDICINE

## 2019-10-10 PROCEDURE — 99284 EMERGENCY DEPT VISIT MOD MDM: CPT

## 2019-10-10 PROCEDURE — 70450 CT HEAD/BRAIN W/O DYE: CPT

## 2019-10-10 PROCEDURE — 85610 PROTHROMBIN TIME: CPT | Performed by: INTERNAL MEDICINE

## 2019-10-10 RX ORDER — MELATONIN
1000 DAILY
Status: DISCONTINUED | OUTPATIENT
Start: 2019-10-11 | End: 2019-10-12

## 2019-10-10 RX ORDER — CHOLECALCIFEROL (VITAMIN D3) 125 MCG
5 CAPSULE ORAL NIGHTLY PRN
Status: DISCONTINUED | OUTPATIENT
Start: 2019-10-10 | End: 2019-10-12

## 2019-10-10 RX ORDER — POTASSIUM CHLORIDE 7.45 MG/ML
10 INJECTION INTRAVENOUS
Status: DISCONTINUED | OUTPATIENT
Start: 2019-10-10 | End: 2019-10-21 | Stop reason: HOSPADM

## 2019-10-10 RX ORDER — MAGNESIUM SULFATE HEPTAHYDRATE 40 MG/ML
2 INJECTION, SOLUTION INTRAVENOUS AS NEEDED
Status: DISCONTINUED | OUTPATIENT
Start: 2019-10-10 | End: 2019-10-11

## 2019-10-10 RX ORDER — FLUCONAZOLE 100 MG/1
100 TABLET ORAL DAILY
COMMUNITY
Start: 2019-10-04 | End: 2019-10-24

## 2019-10-10 RX ORDER — ALUMINA, MAGNESIA, AND SIMETHICONE 2400; 2400; 240 MG/30ML; MG/30ML; MG/30ML
15 SUSPENSION ORAL EVERY 6 HOURS PRN
Status: DISCONTINUED | OUTPATIENT
Start: 2019-10-10 | End: 2019-10-12

## 2019-10-10 RX ORDER — SODIUM CHLORIDE 0.9 % (FLUSH) 0.9 %
3 SYRINGE (ML) INJECTION EVERY 12 HOURS SCHEDULED
Status: DISCONTINUED | OUTPATIENT
Start: 2019-10-10 | End: 2019-10-11

## 2019-10-10 RX ORDER — BISACODYL 10 MG
10 SUPPOSITORY, RECTAL RECTAL DAILY PRN
COMMUNITY

## 2019-10-10 RX ORDER — TROLAMINE SALICYLATE 10 G/100G
1 CREAM TOPICAL 4 TIMES DAILY PRN
COMMUNITY

## 2019-10-10 RX ORDER — POTASSIUM CHLORIDE 1.5 G/1.77G
40 POWDER, FOR SOLUTION ORAL AS NEEDED
Status: DISCONTINUED | OUTPATIENT
Start: 2019-10-10 | End: 2019-10-12

## 2019-10-10 RX ORDER — SODIUM CHLORIDE 0.9 % (FLUSH) 0.9 %
10 SYRINGE (ML) INJECTION AS NEEDED
Status: DISCONTINUED | OUTPATIENT
Start: 2019-10-10 | End: 2019-10-21 | Stop reason: HOSPADM

## 2019-10-10 RX ORDER — BISACODYL 10 MG
10 SUPPOSITORY, RECTAL RECTAL DAILY PRN
Status: DISCONTINUED | OUTPATIENT
Start: 2019-10-10 | End: 2019-10-21 | Stop reason: HOSPADM

## 2019-10-10 RX ORDER — SODIUM CHLORIDE 0.9 % (FLUSH) 0.9 %
10 SYRINGE (ML) INJECTION EVERY 12 HOURS SCHEDULED
Status: DISCONTINUED | OUTPATIENT
Start: 2019-10-10 | End: 2019-10-12

## 2019-10-10 RX ORDER — FLUCONAZOLE 100 MG/1
100 TABLET ORAL DAILY
Status: DISCONTINUED | OUTPATIENT
Start: 2019-10-11 | End: 2019-10-12

## 2019-10-10 RX ORDER — ONDANSETRON 4 MG/1
4 TABLET, FILM COATED ORAL EVERY 4 HOURS PRN
COMMUNITY

## 2019-10-10 RX ORDER — MAGNESIUM SULFATE 1 G/100ML
1 INJECTION INTRAVENOUS AS NEEDED
Status: DISCONTINUED | OUTPATIENT
Start: 2019-10-10 | End: 2019-10-11

## 2019-10-10 RX ORDER — TAMSULOSIN HYDROCHLORIDE 0.4 MG/1
0.4 CAPSULE ORAL NIGHTLY
Status: DISCONTINUED | OUTPATIENT
Start: 2019-10-10 | End: 2019-10-12

## 2019-10-10 RX ORDER — SODIUM CHLORIDE 0.9 % (FLUSH) 0.9 %
10 SYRINGE (ML) INJECTION AS NEEDED
Status: DISCONTINUED | OUTPATIENT
Start: 2019-10-10 | End: 2019-10-12

## 2019-10-10 RX ORDER — HYDRALAZINE HYDROCHLORIDE 20 MG/ML
10 INJECTION INTRAMUSCULAR; INTRAVENOUS EVERY 6 HOURS PRN
Status: DISCONTINUED | OUTPATIENT
Start: 2019-10-10 | End: 2019-10-21 | Stop reason: HOSPADM

## 2019-10-10 RX ORDER — SERTRALINE HYDROCHLORIDE 25 MG/1
25 TABLET, FILM COATED ORAL NIGHTLY
COMMUNITY

## 2019-10-10 RX ORDER — POTASSIUM CHLORIDE 750 MG/1
20 CAPSULE, EXTENDED RELEASE ORAL 2 TIMES DAILY
COMMUNITY

## 2019-10-10 RX ORDER — PANTOPRAZOLE SODIUM 40 MG/10ML
40 INJECTION, POWDER, LYOPHILIZED, FOR SOLUTION INTRAVENOUS
Status: DISCONTINUED | OUTPATIENT
Start: 2019-10-11 | End: 2019-10-15

## 2019-10-10 RX ORDER — BACLOFEN 10 MG/1
10 TABLET ORAL NIGHTLY PRN
Status: DISCONTINUED | OUTPATIENT
Start: 2019-10-10 | End: 2019-10-12

## 2019-10-10 RX ORDER — BACLOFEN 10 MG/1
10 TABLET ORAL NIGHTLY PRN
COMMUNITY

## 2019-10-10 RX ORDER — BACITRACIN ZINC 500 [USP'U]/G
1 OINTMENT TOPICAL DAILY
COMMUNITY

## 2019-10-10 RX ORDER — ATORVASTATIN CALCIUM 10 MG/1
5 TABLET, FILM COATED ORAL NIGHTLY
Status: DISCONTINUED | OUTPATIENT
Start: 2019-10-10 | End: 2019-10-12

## 2019-10-10 RX ORDER — MELATONIN
1000 DAILY
COMMUNITY

## 2019-10-10 RX ORDER — SODIUM CHLORIDE 9 MG/ML
75 INJECTION, SOLUTION INTRAVENOUS CONTINUOUS
Status: DISCONTINUED | OUTPATIENT
Start: 2019-10-10 | End: 2019-10-11

## 2019-10-10 RX ORDER — ACETAMINOPHEN 325 MG/1
TABLET ORAL EVERY 6 HOURS PRN
COMMUNITY

## 2019-10-10 RX ORDER — LANOLIN ALCOHOL/MO/W.PET/CERES
3 CREAM (GRAM) TOPICAL NIGHTLY PRN
COMMUNITY

## 2019-10-10 RX ORDER — TAMSULOSIN HYDROCHLORIDE 0.4 MG/1
1 CAPSULE ORAL NIGHTLY
COMMUNITY

## 2019-10-10 RX ORDER — ONDANSETRON 4 MG/1
4 TABLET, FILM COATED ORAL EVERY 6 HOURS PRN
Status: DISCONTINUED | OUTPATIENT
Start: 2019-10-10 | End: 2019-10-12

## 2019-10-10 RX ORDER — ATORVASTATIN CALCIUM 10 MG/1
5 TABLET, FILM COATED ORAL NIGHTLY
COMMUNITY

## 2019-10-10 RX ORDER — POTASSIUM CHLORIDE 20 MEQ/1
40 TABLET, EXTENDED RELEASE ORAL AS NEEDED
Status: DISCONTINUED | OUTPATIENT
Start: 2019-10-10 | End: 2019-10-12

## 2019-10-10 RX ORDER — PANTOPRAZOLE SODIUM 40 MG/1
40 TABLET, DELAYED RELEASE ORAL DAILY
COMMUNITY
End: 2019-10-21 | Stop reason: HOSPADM

## 2019-10-10 RX ORDER — ONDANSETRON 2 MG/ML
4 INJECTION INTRAMUSCULAR; INTRAVENOUS EVERY 6 HOURS PRN
Status: DISCONTINUED | OUTPATIENT
Start: 2019-10-10 | End: 2019-10-14 | Stop reason: SDUPTHER

## 2019-10-10 RX ADMIN — SODIUM CHLORIDE 75 ML/HR: 900 INJECTION, SOLUTION INTRAVENOUS at 16:51

## 2019-10-10 RX ADMIN — Medication 10 ML: at 21:15

## 2019-10-10 RX ADMIN — ENOXAPARIN SODIUM 70 MG: 80 INJECTION SUBCUTANEOUS at 21:15

## 2019-10-10 NOTE — PROGRESS NOTES
Nutrition Services    Patient Name:  Anuj Escobedo  YOB: 1925  MRN: [id number]  Admit Date:  10/10/2019    Received tube feed consult. Ordering safe start protocol and RDN to assess patient 10/11.    Electronically signed by:  Delfino Thompson RD  10/10/19 4:21 PM

## 2019-10-10 NOTE — CONSULTS
GI CONSULT  NOTE:    Referring Provider:  Dr. Sawant    Chief complaint: Dysphasia, unintentional weight loss, evaluation for PEG placement    Subjective  -Worsening dysphasia since stroke 9/19, 9 pound unintentional weight loss in the past few weeks, requesting to have PEG tube placed.    History of present illness: Anuj Escobedo is a 89 y.o. male with history of recent stroke 9/19 left-sided residual weakness (on Eliquis), atrial fibrillation, coronary artery disease status post CABG, cholecystectomy, and hypertension who presented to the hospital from rehab facility requesting to have PEG tube placed.  He is a accompanied by his spouse and his granddaughter.  Patient is alert and attempts to answer questions but is very hard of hearing.  History is supplemented by his family at bedside.  Per their report, he has been on a diet of honey thickened liquid but they are concerned about his nutritional status as he has unintentionally lost 9 pounds since going to the rehab center.  He has been taking pills and last took Eliquis this morning.  He denies nausea or vomiting.  Dysphasia has worsened since he had his stroke last month.  No abdominal pain.  He does report chronic constipation.  No bright red blood per rectum or melena.  No fever.    Endo History:  3/14 colonoscopy Dr. Granados-normal  2/04 colonoscopy Dr. Granados-hyperplastic colon polyp  Past Medical History:  Past Medical History:   Diagnosis Date   • CAD (coronary artery disease)     s/p CABG 1993 x single vessel   • Chronic atrial fibrillation    • Dyslipidemia    • ED (erectile dysfunction)    • HTN (hypertension)    • Long term current use of anticoagulant therapy    • Myocardial infarction (CMS/HCC)    • Shingles 09/2017   • TIA (transient ischemic attack) 2015       Past Surgical History:  Past Surgical History:   Procedure Laterality Date   • CARDIAC CATHETERIZATION  02/08/1993    RCA with subtotal occlusion with residual thrombus formation at very  "proximal portion; Lad first diagonal branch with proximal 50% stenosis   • CARPAL TUNNEL RELEASE     • CORONARY ARTERY BYPASS GRAFT  02/16/1993    single coronary artery bypass using saphenous vein to the right coronary artery   • INGUINAL HERNIA REPAIR Right 04/2015    (has had total of 3 hernia repairs)   • ORTHOPEDIC SURGERY         Social History:  Social History     Tobacco Use   • Smoking status: Former Smoker     Packs/day: 1.00     Types: Cigarettes   Substance Use Topics   • Alcohol use: No   • Drug use: Not on file       Family History:  Family History   Problem Relation Age of Onset   • Atrial fibrillation Sister    • Atrial fibrillation Sister        Medications:    (Not in a hospital admission)  Has been taking Eliquis since stroke last month.    Scheduled Meds:  Continuous Infusions:  No current facility-administered medications for this encounter.   PRN Meds:.    ALLERGIES:  Celecoxib and Ibuprofen    ROS:  The following systems were reviewed and negative;   Constitution:  No fevers, chills  Skin: no rash, no jaundice  Eyes:  No blurry vision, no eye pain  HENT:  No change in hearing or smell  Resp:  No dyspnea or cough  CV:  No chest pain or palpitations  :  No dysuria, hematuria  Musculoskeletal:  No leg cramps or arthralgias, + left-sided weakness  Neuro:  No tremor, no numbness  Psych:  No depression or confusion    Objective     Vital Signs:   Vitals:    10/10/19 1145   BP: 110/72   BP Location: Right arm   Patient Position: Sitting   Pulse: 80   Resp: 16   Temp: 97.5 °F (36.4 °C)   TempSrc: Oral   SpO2: 97%   Weight: 70.8 kg (156 lb)   Height: 177.8 cm (70\")       Physical Exam:       General Appearance:    Awake and alert, in no acute distress, hard of hearing, thin habitus   Head:    Normocephalic, without obvious abnormality, atraumatic   Throat:   No oral lesions, no thrush, oral mucosa moist   Lungs:     Respirations regular, even and unlabored, heart rate irregularly irregular   Chest " Wall:    No abnormalities observed   Abdomen:     Soft, non-tender, no rebound or guarding, non-distended, no hepatosplenomegaly   Rectal:     Deferred   Extremities:  Gross left side motor movement present, lower extremity edema noted   Pulses:   Pulses palpable and equal bilaterally   Skin:   No rash, no jaundice, normal palpation   Lymph nodes:   No cervical, supraclavicular or submandibular palpable adenopathy   Neurologic:  Follows commands, left-sided weakness noted       Results Review:   I reviewed the patient's labs and imaging.  Lab Results (last 24 hours)     ** No results found for the last 24 hours. **          Imaging Results (last 24 hours)     ** No results found for the last 24 hours. **             ASSESSMENT AND PLAN:      Active Problems:    * No active hospital problems. *         Assessment:    Evaluation for PEG placement  Dysphasia  Unintentional weight loss  Recent CVA-on Eliquis  Atrial fibrillation  Coronary artery disease status post CABG  Hypertension    Plan:    Patient was brought to the emergency room from rehab facility requesting to have PEG tube placed.  He has been losing weight at rehab.  Family is concerned that he is not receiving adequate nutrition.  Discussed with patient and family risks and benefits of PEG placement including but not limited to bleeding, infection, perforation.  They verbalize understanding, denies further questions, wish to have PEG inserted.  Patient last took Eliquis this morning so PEG cannot be placed today.  Will need to hold Eliquis and have Lovenox bridge.  Will discuss timing of PEG insertion with Dr. Maldonado.  Temporarily Dobbhoff tube may be inserted to meet his nutritional needs.  I am uncertain if the rehab facility will accept patient with Dobbhoff tube.  Family is requesting to have PICC line inserted and start TPN if he cannot get nutrition soon.  We will request discharge summary from [facility] for review.  Will discuss the  case with Dr. Maldonado and plan PEG insertion accordingly.    I discussed the patients findings and my recommendations with the patient.  ESPERANZA Fields  10/10/19  12:39 PM        Much of the above report is an electronic transcription/translation of the spoken language to printed text using Dragon Software. As such, the subtleties and finesse of the spoken language may permit erroneous, or at times, nonsensical words or phrases to be inadvertently transcribed; thus changes may be made at a later date to rectify these errors.

## 2019-10-10 NOTE — TELEPHONE ENCOUNTER
I misunderstood patient's daughter's first message.  I spoke to her personally and this is what she said:    Patient had a stroke and is [facility]. Patient is unable to swallow so he is unable to eat, drink or take medication.  Rehab told Vy that they can't give a feeding tube in their facility and would need to go to the hospital.  She said that he has gone 4 days without medication and had another stroke.  After we terminated our first phone call, she called back to report that they are taking him to the hospital.     Ultimately, patient's daughter is worried that he there will be issues because he does not have a PCP since Dr. Arriaza retired.  She asked if there is anything further that you can do for the patient?  I told her that we can still send paperwork to be signed by Dr. Andrade if MD signature is required.  I also told her that the patient needs to follow up with a new PCP after he is out of the hospital.

## 2019-10-10 NOTE — ED NOTES
Called report to  floor, Marzena, RN, let her know that patient has order for Dobhoff, we have tried getting one from surgery for over 30minutes and have yet to receive it yet, she is aware that it has not been placed and the order for the KUB is in already to check placement.     Mica Liz, RN  10/10/19 2876

## 2019-10-10 NOTE — ED PROVIDER NOTES
Subjective   History of Present Illness  Difficulty swallowing  89-year-old male complains of difficulty swallowing present from the rehab facility he had a stroke in September and has had dysphagia since that time.  He has been taking and honey thickened liquids but there is concern for him not getting enough nutrition.  There was no reported emergent event today.  The rehab facility sent him in for G-tube placement.  Patient denies other complaints.  He reports no pain or shortness of breath or cough  Review of Systems   Constitutional: Negative for fever.   HENT: Positive for trouble swallowing. Negative for sore throat.    Eyes: Negative.    Respiratory: Negative for shortness of breath.    Cardiovascular: Negative for chest pain.   Gastrointestinal: Negative.    Endocrine: Negative.    Genitourinary: Negative.    Musculoskeletal: Negative.    Skin: Negative.    Neurological: Negative.    Psychiatric/Behavioral: Negative.        Past Medical History:   Diagnosis Date   • CAD (coronary artery disease)     s/p CABG 1993 x single vessel   • Chronic atrial fibrillation    • Dyslipidemia    • ED (erectile dysfunction)    • HTN (hypertension)    • Long term current use of anticoagulant therapy    • Myocardial infarction (CMS/HCC)    • Shingles 09/2017   • TIA (transient ischemic attack) 2015       Allergies   Allergen Reactions   • Celecoxib Unknown (See Comments)   • Ibuprofen Unknown (See Comments)       Past Surgical History:   Procedure Laterality Date   • CARDIAC CATHETERIZATION  02/08/1993    RCA with subtotal occlusion with residual thrombus formation at very proximal portion; Lad first diagonal branch with proximal 50% stenosis   • CARPAL TUNNEL RELEASE     • CORONARY ARTERY BYPASS GRAFT  02/16/1993    single coronary artery bypass using saphenous vein to the right coronary artery   • INGUINAL HERNIA REPAIR Right 04/2015    (has had total of 3 hernia repairs)   • ORTHOPEDIC SURGERY         Family History  "  Problem Relation Age of Onset   • Atrial fibrillation Sister    • Atrial fibrillation Sister        Social History     Socioeconomic History   • Marital status:      Spouse name: Not on file   • Number of children: Not on file   • Years of education: Not on file   • Highest education level: Not on file   Tobacco Use   • Smoking status: Former Smoker     Packs/day: 1.00     Types: Cigarettes   Substance and Sexual Activity   • Alcohol use: No           Objective   Physical Exam  /71   Pulse 77   Temp 97.5 °F (36.4 °C) (Oral)   Resp 16   Ht 177.8 cm (70\")   Wt 70.8 kg (156 lb)   SpO2 96%   BMI 22.38 kg/m²   General: Thin elderly male in no acute distress, hard of hearing awake and alert  Eyes: Pupils round and reactive, sclera nonicteric  HEENT: Mucous membranes moist, no mucosal swelling  Neck: Supple, no nuchal rigidity, no lymphadenopathy  Respirations: Respirations nonlabored, equal breath sounds bilaterally, clear lungs  Heart regular rate and rhythm,   Abdomen soft nontender nondistended,  Extremities trace edema in the bilateral lower extremities  Neuro generally weak, some left-sided deficits  Psych oriented, pleasant affect  Skin no rash, brisk cap refill  Procedures           ED Course                  MDM  Case discussed with gastroenterology.  He was seen in the emergency room by gastro and due to patient being on chronic anticoagulation therapy it was advised to have patient admitted to the hospital for further nutrients and Dobbhoff placement and plan for Lovenox bridging and PEG tube placement on hospitalization.  Hospitalist service was paged and patient remained stable throughout the emergency room course  Final diagnoses:   Dysphagia, unspecified type              Bakari Sawant MD  10/10/19 8553    "

## 2019-10-10 NOTE — NURSING NOTE
Tried three times to place dobhoff. Unable to place. Called Dr. Guajardo and she asked me to call GI. Dr. Fofana said to consult IR for placement tomorrow.

## 2019-10-10 NOTE — ED NOTES
Patients wife reports [facility] sent pt to ED for gtube placement. Pt had stroke on 9/14 and has been at [facility] to 9/20. Pt has had trouble swallowing for past 9 days.     Tammy Awad, RN  10/10/19 1809

## 2019-10-10 NOTE — TELEPHONE ENCOUNTER
Patient's daughter called and said patient is not getting his medication refilled that he needs.  I returned her phone call.  She did not answer so I left a vm letting her know that Dr. Arriaza is no longer practicing and that the patient would need to follow up with a new PCP.  I said that Tammy might send a bridge refill for some medications but she will not refill any controlled substances or pain medication without first seeing the patient.

## 2019-10-10 NOTE — H&P
[facility]       PCP:  Devon Arriaza Jr., MD      CHIEF COMPLAINT:     Dysphagia      HISTORY OF PRESENT ILLNESS:    Information obtained from wife and granddaughter at bedside due to patient being very hard of hearing.    This is a 89-year-old  male with a past medical history of CVA, hyperlipidemia, CAD status post CABG, thrush, vitamin D deficiency, chronic atrial fibrillation, hypertension, and MI who presented to [facility] on 10/10/2019 with complaints of dysphagia.  Per the wife the patient had a stroke on 9/14/2019 and was admitted to [facility].  Patient was then discharged to [facility] and has been there for about 3 weeks.  Patient's wife states that the patient was able to eat some at [facility], however at [facility] he has progressively gotten weaker due to not being able to swallow.  Patient has had left-sided weakness as a result of the stroke.  Per the family last week patient was given a fourth of a tablet of Prozac and went unresponsive for 48 hours at [facility].  Patient has also lost 9 pounds in 10 days due to getting no nutrition.  He was placed on IV fluids at [facility] but was taken off of them due to them causing peripheral edema.  Denies recent nausea, vomiting, diarrhea, fever, chills.    In the ED, vital signs were stable.  GI was consulted.  Dobbhoff tube was ordered.    Past Medical History:   Diagnosis Date   • CAD (coronary artery disease)     s/p CABG 1993 x single vessel   • Chronic atrial fibrillation    • Dyslipidemia    • ED (erectile dysfunction)    • HTN (hypertension)    • Long term current use of anticoagulant therapy    • Myocardial infarction (CMS/HCC)    • Shingles 09/2017   • TIA (transient ischemic attack) 2015     Past Surgical History:   Procedure Laterality Date   • CARDIAC CATHETERIZATION  02/08/1993    RCA with subtotal occlusion with residual thrombus formation at very proximal portion; Lad first diagonal branch with proximal  "50% stenosis   • CARPAL TUNNEL RELEASE     • CORONARY ARTERY BYPASS GRAFT  02/16/1993    single coronary artery bypass using saphenous vein to the right coronary artery   • INGUINAL HERNIA REPAIR Right 04/2015    (has had total of 3 hernia repairs)   • ORTHOPEDIC SURGERY       Family History   Problem Relation Age of Onset   • Atrial fibrillation Sister    • Atrial fibrillation Sister      Social History     Tobacco Use   • Smoking status: Former Smoker     Packs/day: 1.00     Types: Cigarettes   Substance Use Topics   • Alcohol use: No   • Drug use: Not on file         (Not in a hospital admission)    Allergies:  Celecoxib and Ibuprofen    Immunization History   Administered Date(s) Administered   • Flu Vaccine High Dose PF 65YR+ 10/17/2018   • Pneumococcal Conjugate 13-Valent (PCV13) 10/31/2018         REVIEW OF SYSTEMS:    Review of Systems   HENT: Positive for trouble swallowing.    Respiratory: Negative.    Cardiovascular: Negative.    Gastrointestinal: Negative.    Genitourinary: Negative.    Musculoskeletal:        Left sided weakness    Skin: Negative.    Neurological: Positive for weakness.   Psychiatric/Behavioral: Negative.        Vital Signs  Temp:  [97.5 °F (36.4 °C)] 97.5 °F (36.4 °C)  Heart Rate:  [77-80] 77  Resp:  [16] 16  BP: (110-124)/(71-72) 124/71    Flowsheet Rows      First Filed Value   Admission Height  177.8 cm (70\") Documented at 10/10/2019 1145   Admission Weight  70.8 kg (156 lb) Documented at 10/10/2019 1145           Physical Exam:  Physical Exam   Constitutional: He is oriented to person, place, and time.   Fragile   HENT:   Head: Normocephalic and atraumatic.   Eyes: Conjunctivae and EOM are normal. Pupils are equal, round, and reactive to light.   Neck: Normal range of motion.   Cardiovascular: Normal rate, regular rhythm and normal heart sounds.   S1, S2 audible   Pulmonary/Chest: Effort normal.   Diminished breath sounds bilaterally, on room air    Abdominal: Soft. Bowel sounds " are normal.   Neurological: He is alert and oriented to person, place, and time.   Left sided weakness noted, patient unable to follow finger towards the left side of his body following with his eyes    Skin: Skin is warm and dry.   Psychiatric: Mood, memory, affect and judgment normal.   Vitals reviewed.        Results Review:   I reviewed the patient's new clinical results.    Lab Results (most recent)     None          Imaging Results (most recent)     None            ECG/EMG Results (most recent)     None               Assessment/Plan     Dysphagia  -KUB ordered  -Dobbhoff tube insertion planned  - NPO   -Check chest x-ray to rule out aspiration pneumonia  -Check CBC and CMP  -Protonix ordered IV  -Gentle IV hydration ordered  -GI consulted-possible PEG placement in a couple days  - Nutrition consult     ?  Acute kidney injury  -Check CMP today  -Creatinine 1.4, BUN 29 yesterday  -Baseline creatinine 1.1-1.2  -Avoid nephrotoxic medications  -IV fluids ordered for now    Chronic hypotension  -Controlled  -Per wife patient systolic runs 100-110  -Monitor blood pressure    CAD status post CABG  -Seen by Dr. Ayala outpatient  -On atorvastatin    Chronic A. Fib  -On Eliquis, but held at this time Lovenox ordered pending GI procedure    Hyperlipidemia  -On atorvastatin    Oral thrush  -On Diflucan  -Liliana's Magic mouthwash ordered    History of CVA 9/2019  -Eliquis held, pharmacy to dose Lovenox pending GI procedure  -Patient has residual effects of left-sided weakness, recheck CT head due to patient getting progressively worse and having episode of unresponsiveness last week  -On baclofen for muscle spasms  - PT/OT ordered     BPH  -On Flomax    Depression  -On Zoloft    Vitamin D deficiency  -On vitamin D    History of of squamous cell carcinoma    History of MI    VTE Prophylaxis -Lovenox

## 2019-10-10 NOTE — TELEPHONE ENCOUNTER
I would recommend he go ahead and schedule an appointment for a follow up with a new PCP. If she lets them know the situation then they should be able to work him in.  I would call the [facility] and see when the next new patient appointment is.  The hospital should also schedule a hospital follow up for him before he is discharged.

## 2019-10-11 ENCOUNTER — APPOINTMENT (OUTPATIENT)
Dept: GENERAL RADIOLOGY | Facility: HOSPITAL | Age: 84
End: 2019-10-11

## 2019-10-11 ENCOUNTER — ANESTHESIA EVENT (OUTPATIENT)
Dept: GASTROENTEROLOGY | Facility: HOSPITAL | Age: 84
End: 2019-10-11

## 2019-10-11 ENCOUNTER — INPATIENT HOSPITAL (OUTPATIENT)
Dept: URBAN - METROPOLITAN AREA HOSPITAL 84 | Facility: HOSPITAL | Age: 84
End: 2019-10-11
Payer: COMMERCIAL

## 2019-10-11 DIAGNOSIS — R63.4 ABNORMAL WEIGHT LOSS: ICD-10-CM

## 2019-10-11 DIAGNOSIS — R13.10 DYSPHAGIA, UNSPECIFIED: ICD-10-CM

## 2019-10-11 PROBLEM — T07.XXXA ABRASIONS OF MULTIPLE SITES: Status: ACTIVE | Noted: 2019-10-11

## 2019-10-11 LAB
ANION GAP SERPL CALCULATED.3IONS-SCNC: 13.9 MMOL/L (ref 5–15)
BASOPHILS # BLD AUTO: 0 10*3/MM3 (ref 0–0.2)
BASOPHILS NFR BLD AUTO: 0.2 % (ref 0–1.5)
BUN BLD-MCNC: 32 MG/DL (ref 8–20)
BUN/CREAT SERPL: 26.7 (ref 6.2–20.3)
CALCIUM SPEC-SCNC: 9.5 MG/DL (ref 8.9–10.3)
CHLORIDE SERPL-SCNC: 114 MMOL/L (ref 101–111)
CO2 SERPL-SCNC: 27 MMOL/L (ref 22–32)
CREAT BLD-MCNC: 1.2 MG/DL (ref 0.7–1.2)
DEPRECATED RDW RBC AUTO: 52.5 FL (ref 37–54)
EOSINOPHIL # BLD AUTO: 0 10*3/MM3 (ref 0–0.4)
EOSINOPHIL NFR BLD AUTO: 0.4 % (ref 0.3–6.2)
ERYTHROCYTE [DISTWIDTH] IN BLOOD BY AUTOMATED COUNT: 15.7 % (ref 12.3–15.4)
GFR SERPL CREATININE-BSD FRML MDRD: 56 ML/MIN/1.73
GLUCOSE BLD-MCNC: 79 MG/DL (ref 65–99)
HCT VFR BLD AUTO: 46.2 % (ref 37.5–51)
HGB BLD-MCNC: 15.3 G/DL (ref 13–17.7)
LYMPHOCYTES # BLD AUTO: 1 10*3/MM3 (ref 0.7–3.1)
LYMPHOCYTES NFR BLD AUTO: 13.7 % (ref 19.6–45.3)
MAGNESIUM SERPL-MCNC: 2.4 MG/DL (ref 1.8–2.5)
MCH RBC QN AUTO: 31 PG (ref 26.6–33)
MCHC RBC AUTO-ENTMCNC: 33.1 G/DL (ref 31.5–35.7)
MCV RBC AUTO: 93.9 FL (ref 79–97)
MONOCYTES # BLD AUTO: 0.7 10*3/MM3 (ref 0.1–0.9)
MONOCYTES NFR BLD AUTO: 9.1 % (ref 5–12)
NEUTROPHILS # BLD AUTO: 5.8 10*3/MM3 (ref 1.7–7)
NEUTROPHILS NFR BLD AUTO: 76.6 % (ref 42.7–76)
NRBC BLD AUTO-RTO: 0 /100 WBC (ref 0–0.2)
PLATELET # BLD AUTO: 157 10*3/MM3 (ref 140–450)
PMV BLD AUTO: 8.3 FL (ref 6–12)
POTASSIUM BLD-SCNC: 3.9 MMOL/L (ref 3.6–5.1)
RBC # BLD AUTO: 4.92 10*6/MM3 (ref 4.14–5.8)
SODIUM BLD-SCNC: 151 MMOL/L (ref 136–144)
WBC NRBC COR # BLD: 7.5 10*3/MM3 (ref 3.4–10.8)

## 2019-10-11 PROCEDURE — 97162 PT EVAL MOD COMPLEX 30 MIN: CPT

## 2019-10-11 PROCEDURE — 97530 THERAPEUTIC ACTIVITIES: CPT

## 2019-10-11 PROCEDURE — 85025 COMPLETE CBC W/AUTO DIFF WBC: CPT | Performed by: NURSE PRACTITIONER

## 2019-10-11 PROCEDURE — 99232 SBSQ HOSP IP/OBS MODERATE 35: CPT | Performed by: INTERNAL MEDICINE

## 2019-10-11 PROCEDURE — 80048 BASIC METABOLIC PNL TOTAL CA: CPT | Performed by: NURSE PRACTITIONER

## 2019-10-11 PROCEDURE — 92610 EVALUATE SWALLOWING FUNCTION: CPT

## 2019-10-11 PROCEDURE — 25010000002 ENOXAPARIN PER 10 MG: Performed by: NURSE PRACTITIONER

## 2019-10-11 PROCEDURE — 99231 SBSQ HOSP IP/OBS SF/LOW 25: CPT | Performed by: NURSE PRACTITIONER

## 2019-10-11 PROCEDURE — 83735 ASSAY OF MAGNESIUM: CPT | Performed by: NURSE PRACTITIONER

## 2019-10-11 PROCEDURE — B548ZZA ULTRASONOGRAPHY OF SUPERIOR VENA CAVA, GUIDANCE: ICD-10-PCS | Performed by: INTERNAL MEDICINE

## 2019-10-11 PROCEDURE — 02HV33Z INSERTION OF INFUSION DEVICE INTO SUPERIOR VENA CAVA, PERCUTANEOUS APPROACH: ICD-10-PCS | Performed by: INTERNAL MEDICINE

## 2019-10-11 PROCEDURE — 71045 X-RAY EXAM CHEST 1 VIEW: CPT

## 2019-10-11 PROCEDURE — C1751 CATH, INF, PER/CENT/MIDLINE: HCPCS

## 2019-10-11 RX ORDER — SODIUM CHLORIDE 0.9 % (FLUSH) 0.9 %
10 SYRINGE (ML) INJECTION EVERY 12 HOURS SCHEDULED
Status: DISCONTINUED | OUTPATIENT
Start: 2019-10-11 | End: 2019-10-12

## 2019-10-11 RX ORDER — SODIUM CHLORIDE 0.9 % (FLUSH) 0.9 %
10 SYRINGE (ML) INJECTION EVERY 12 HOURS SCHEDULED
Status: DISCONTINUED | OUTPATIENT
Start: 2019-10-11 | End: 2019-10-21 | Stop reason: HOSPADM

## 2019-10-11 RX ORDER — WHEY PROTEIN ISOLATE 6 G-25/7 G
1 POWDER (GRAM) ORAL DAILY
Status: DISCONTINUED | OUTPATIENT
Start: 2019-10-12 | End: 2019-10-12

## 2019-10-11 RX ORDER — SODIUM CHLORIDE 0.9 % (FLUSH) 0.9 %
20 SYRINGE (ML) INJECTION AS NEEDED
Status: DISCONTINUED | OUTPATIENT
Start: 2019-10-11 | End: 2019-10-21 | Stop reason: HOSPADM

## 2019-10-11 RX ORDER — SODIUM CHLORIDE 0.9 % (FLUSH) 0.9 %
10 SYRINGE (ML) INJECTION AS NEEDED
Status: DISCONTINUED | OUTPATIENT
Start: 2019-10-11 | End: 2019-10-21 | Stop reason: HOSPADM

## 2019-10-11 RX ADMIN — SODIUM CHLORIDE 75 ML/HR: 900 INJECTION, SOLUTION INTRAVENOUS at 07:33

## 2019-10-11 RX ADMIN — ASCORBIC ACID, VITAMIN A PALMITATE, CHOLECALCIFEROL, THIAMINE HYDROCHLORIDE, RIBOFLAVIN-5 PHOSPHATE SODIUM, PYRIDOXINE HYDROCHLORIDE, NIACINAMIDE, DEXPANTHENOL, ALPHA-TOCOPHEROL ACETATE, VITAMIN K1, FOLIC ACID, BIOTIN, CYANOCOBALAMIN: 200; 3300; 200; 6; 3.6; 6; 40; 15; 10; 150; 600; 60; 5 INJECTION, SOLUTION INTRAVENOUS at 20:27

## 2019-10-11 RX ADMIN — Medication 10 ML: at 07:29

## 2019-10-11 RX ADMIN — Medication 10 ML: at 20:30

## 2019-10-11 RX ADMIN — ENOXAPARIN SODIUM 70 MG: 80 INJECTION SUBCUTANEOUS at 20:32

## 2019-10-11 RX ADMIN — PANTOPRAZOLE SODIUM 40 MG: 40 INJECTION, POWDER, FOR SOLUTION INTRAVENOUS at 07:28

## 2019-10-11 RX ADMIN — ENOXAPARIN SODIUM 70 MG: 80 INJECTION SUBCUTANEOUS at 07:29

## 2019-10-11 RX ADMIN — NYSTATIN 15 ML: 100000 SUSPENSION ORAL at 16:42

## 2019-10-11 RX ADMIN — NYSTATIN 15 ML: 100000 SUSPENSION ORAL at 20:32

## 2019-10-11 NOTE — THERAPY EVALUATION
Patient Name: Anuj Escobedo  : 1925    MRN: [id number]                              Today's Date: 10/11/2019       Admit Date: 10/10/2019    Visit Dx:     ICD-10-CM ICD-9-CM   1. Dysphagia, unspecified type R13.10 787.20   2. Oropharyngeal dysphagia R13.12 787.22     Patient Active Problem List   Diagnosis   • Dysphagia   • Atrial fibrillation, chronic   • Dyslipidemia   • Degenerative arthritis   • Coronary artery disease   • Benign prostatic hyperplasia   • Hypertension   • Long term current use of anticoagulant therapy   • Mixed hyperlipidemia   • Peripheral edema   • Primary malignant neoplasm of head (CMS/HCC)   • Shingles   • Oropharyngeal dysphagia   • Abrasions of multiple sites     Past Medical History:   Diagnosis Date   • CAD (coronary artery disease)     s/p CABG  x single vessel   • Chronic atrial fibrillation    • Dyslipidemia    • ED (erectile dysfunction)    • HTN (hypertension)    • Long term current use of anticoagulant therapy    • Myocardial infarction (CMS/HCC)    • Shingles 2017   • TIA (transient ischemic attack)      Past Surgical History:   Procedure Laterality Date   • CARDIAC CATHETERIZATION  1993    RCA with subtotal occlusion with residual thrombus formation at very proximal portion; Lad first diagonal branch with proximal 50% stenosis   • CARPAL TUNNEL RELEASE     • CORONARY ARTERY BYPASS GRAFT  1993    single coronary artery bypass using saphenous vein to the right coronary artery   • INGUINAL HERNIA REPAIR Right 2015    (has had total of 3 hernia repairs)   • ORTHOPEDIC SURGERY       General Information     Row Name 10/11/19 1209          PT Evaluation Time/Intention    Document Type  evaluation  -CM     Mode of Treatment  physical therapy;individual therapy 89 yo male adm 10/10/19 from [facility] rehab for increased weakness & dysphagia. Now being followed for dobb megha insertion, w/ peg tube placement scheduled for 10/14/19.   -CM     Row Name 10/11/19 3970  10/11/19 1209       General Information    Patient Profile Reviewed?  --  yes  -CM    Prior Level of Function  mod assist:;gait family reports that while at rehab pt was able to amb w/ rw x 500 ft  -CM  --    Existing Precautions/Restrictions  --  fall  -CM    Barriers to Rehab  --  cognitive status;previous functional deficit;hearing deficit  -CM    Row Name 10/11/19 1209          Relationship/Environment    Lives With  spouse  -     Row Name 10/11/19 1209          Resource/Environmental Concerns    Current Living Arrangements  other (see comments) from IP rehab; lives w/ wife at home normally  -     Row Name 10/11/19 1209          Cognitive Assessment/Intervention- PT/OT    Orientation Status (Cognition)  oriented to;place;person;situation  -     Cognitive Assessment/Intervention Comment  did not know month, season, or year  -     Row Name 10/11/19 1209          Safety Issues, Functional Mobility    Impairments Affecting Function (Mobility)  balance;cognition;coordination;endurance/activity tolerance;muscle tone abnormal;strength;postural/trunk control  -     Comment, Safety Issues/Impairments (Mobility)  moderate to severe hypertonicity in LUE, moderate hypertonicity in LLE  -CM       User Key  (r) = Recorded By, (t) = Taken By, (c) = Cosigned By    Initials Name Provider Type    CM Iris Kelley, PT Physical Therapist        Mobility     Row Name 10/11/19 1213          Bed Mobility Assessment/Treatment    Bed Mobility Assessment/Treatment  bed mobility (all) activities;supine-sit;sit-supine  -CM     [place] Level (Bed Mobility)  maximum assist (25% patient effort);2 person assist  -CM     Supine-Sit [place] (Bed Mobility)  moderate assist (50% patient effort);1 person assist uses RUE to assist & able to assist w/ bringing LEs to eob  -CM     Sit-Supine [place] (Bed Mobility)  moderate assist (50% patient effort);2 person assist  -CM     Assistive Device (Bed Mobility)  draw sheet;bed  rails  -CM     Row Name 10/11/19 1213          Sit-Stand Transfer    Sit-Stand [place] (Transfers)  dependent (less than 25% patient effort) unable to come to standing fully from sitting at eob  -CM     Row Name 10/11/19 1215          Gait/Stairs Assessment/Training    [place] Level (Gait)  not tested  -CM     Distance in Feet (Gait)  unable to safely ambulate  -CM       User Key  (r) = Recorded By, (t) = Taken By, (c) = Cosigned By    Initials Name Provider Type    Iris Pina, PT Physical Therapist        Obj/Interventions     Row Name 10/11/19 1216          General ROM    GENERAL ROM COMMENTS  wfl RUE/RLE; LUE contracted at wrist/hand; LLE wfl  -CM     Row Name 10/11/19 1216          MMT (Manual Muscle Testing)    General MMT Comments  LUE 3-/5 at shoulder, 2/5 at elbow, L wrist & hand non-functional 2* spasticity; LLE 2+/5; RUE 4-/5, RLE 4-/5  -CM     Row Name 10/11/19 1216          Static Sitting Balance    Level of [place] (Unsupported Sitting, Static Balance)  minimal assist, 75% patient effort  -CM     Sitting Position (Unsupported Sitting, Static Balance)  sitting on edge of bed  -CM     Row Name 10/11/19 1216          Dynamic Sitting Balance    Level of [place], Reaches Outside Midline (Sitting, Dynamic Balance)  moderate assist, 50 to 74% patient effort  -CM     Sitting Position, Reaches Outside Midline (Sitting, Dynamic Balance)  sitting on edge of bed  -CM     Row Name 10/11/19 1216          Static Standing Balance    Level of [place] (Supported Standing, Static Balance)  unable to perform activity  -CM     Row Name 10/11/19 1216          Dynamic Standing Balance    Level of [place], Reaches Outside Midline (Standing, Dynamic Balance)  unable to perform activity  -CM       User Key  (r) = Recorded By, (t) = Taken By, (c) = Cosigned By    Initials Name Provider Type    Iris Pina, PT Physical Therapist        Goals/Plan     Row Name 10/11/19 1226 10/11/19  1225       Bed Mobility Goal 1 (PT)    Activity/Assistive Device (Bed Mobility Goal 1, PT)  --  bed mobility activities, all  -CM    [place] Level/Cues Needed (Bed Mobility Goal 1, PT)  moderate assist (50-74% patient effort);2 person assist  -CM  --    Time Frame (Bed Mobility Goal 1, PT)  2 weeks  -CM  --    Row Name 10/11/19 1226          Transfer Goal 1 (PT)    Activity/Assistive Device (Transfer Goal 1, PT)  transfers, all;walker, rolling  -CM     [place] Level/Cues Needed (Transfer Goal 1, PT)  moderate assist (50-74% patient effort);2 person assist  -CM     Time Frame (Transfer Goal 1, PT)  2 weeks  -CM     Row Name 10/11/19 1226          Gait Training Goal 1 (PT)    Activity/Assistive Device (Gait Training Goal 1, PT)  gait (walking locomotion);assistive device use;walker, rolling  -CM     [place] Level (Gait Training Goal 1, PT)  moderate assist (50-74% patient effort);2 person assist  -CM     Distance (Gait Goal 1, PT)  25 ft  -CM     Time Frame (Gait Training Goal 1, PT)  2 weeks  -CM       User Key  (r) = Recorded By, (t) = Taken By, (c) = Cosigned By    Initials Name Provider Type    Iris Pina, PT Physical Therapist        Clinical Impression     Row Name 10/11/19 1221          Pain Assessment    Additional Documentation  Pain Scale: Numbers Pre/Post-Treatment (Group)  -CM     Row Name 10/11/19 1221          Pain Scale: Numbers Pre/Post-Treatment    Pain Scale: Numbers, Pretreatment  0/10 - no pain  -CM     Pain Scale: Numbers, Post-Treatment  0/10 - no pain  -CM     Row Name 10/11/19 1221          Plan of Care Review    Plan of Care Reviewed With  patient;grandchild(nadia);spouse  -CM     Row Name 10/11/19 1221          Physical Therapy Clinical Impression    Patient/Family Goals Statement (PT Clinical Impression)  89 yo male adm from IP rehab for dysphagia, wt loss. 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 Pt to have peg tube placed on 10/14/19. Needs to continue skilled PT to  work toward increased functional mobility s/p cva.  -CM     Criteria for Skilled Interventions Met (PT Clinical Impression)  yes;treatment indicated  -CM     Rehab Potential (PT Clinical Summary)  fair, will monitor progress closely  -CM     Predicted Duration of Therapy (PT)  until d/c  -CM     Row Name 10/11/19 1221          Vital Signs    O2 Delivery Pre Treatment  room air  -CM     O2 Delivery Post Treatment  room air  -CM     Row Name 10/11/19 1221          Positioning and Restraints    Pre-Treatment Position  in bed  -CM     Post Treatment Position  bed  -CM     In Bed  notified nsg;side lying left;call light within reach;encouraged to call for assist;exit alarm on;with family/caregiver  -CM       User Key  (r) = Recorded By, (t) = Taken By, (c) = Cosigned By    Initials Name Provider Type    Iris Pina, PT Physical Therapist        Outcome Measures     Row Name 10/11/19 1229          How much help from another person do you currently need...    Turning from your back to your side while in flat bed without using bedrails?  1  -CM     Moving from lying on back to sitting on the side of a flat bed without bedrails?  1  -CM     Moving to and from a bed to a chair (including a wheelchair)?  1  -CM     Standing up from a chair using your arms (e.g., wheelchair, bedside chair)?  1  -CM     Climbing 3-5 steps with a railing?  1  -CM     To walk in hospital room?  1  -CM     AM-PAC 6 Clicks Score (PT)  6  -CM     Row Name 10/11/19 1229          Modified Ligia Scale    Pre-Stroke Modified [place]ia Scale  4 - Moderately severe disability.  Unable to walk without assistance, and unable to attend to own bodily needs without assistance.  -CM     Modified [place] Scale  5 - Severe disability.  Bedridden, incontinent, and requiring constant nursing care and attention.  -CM     Row Name 10/11/19 1229          Functional Assessment    Outcome Measure Options  Modified [place];AM-PAC 6 Clicks Basic Mobility (PT)  -CM        User Key  (r) = Recorded By, (t) = Taken By, (c) = Cosigned By    Initials Name Provider Type    Iris Pina, PT Physical Therapist        Physical Therapy Education     Title: PT OT SLP Therapies (In Progress)     Topic: Physical Therapy (In Progress)     Point: Mobility training (Done)     Learning Progress Summary           Patient Acceptance, E,TB, VU by CM at 10/11/2019 12:27 PM   Family Acceptance, E,TB, VU by CM at 10/11/2019 12:27 PM                   Point: Body mechanics (Done)     Learning Progress Summary           Patient Acceptance, E,TB, VU by CM at 10/11/2019 12:27 PM   Family Acceptance, E,TB, VU by CM at 10/11/2019 12:27 PM                   Point: Precautions (Done)     Learning Progress Summary           Patient Acceptance, E,TB, VU by CM at 10/11/2019 12:27 PM   Family Acceptance, E,TB, VU by CM at 10/11/2019 12:27 PM                               User Key     Initials Effective Dates Name Provider Type Discipline     03/01/19 -  Iris Kelley, PT Physical Therapist PT              PT Recommendation and Plan  Planned Therapy Interventions (PT Eval): balance training, bed mobility training, gait training, motor coordination training, neuromuscular re-education, patient/family education, postural re-education, strengthening, stretching, transfer training  Outcome Summary/Treatment Plan (PT)  Anticipated Discharge Disposition (PT): inpatient rehabilitation facility  Plan of Care Reviewed With: patient, spouse  Outcome Summary: 89 yo male adm from IP rehab for dysphagia, wt loss. 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 Pt to have peg tube placed on 10/14/19. Needs to continue skilled PT to work toward increased functional mobility s/p cva.     Time Calculation:   PT Charges     Row Name 10/11/19 4010             Time Calculation    Start Time  1026  -CM      Stop Time  1059  -CM      Time Calculation (min)  33 min  -CM      PT Received On  10/11/19  -CM      PT - Next Appointment   10/12/19  -CM      PT Goal Re-Cert Due Date  10/25/19  -CM         Time Calculation- PT    Total Timed Code Minutes- PT  23 minute(s)  -CM        User Key  (r) = Recorded By, (t) = Taken By, (c) = Cosigned By    Initials Name Provider Type    Iris Pina, PT Physical Therapist        Therapy Charges for Today     Code Description Service Date Service Provider Modifiers Qty    [id number] HC PT EVAL MOD COMPLEXITY 4 10/11/2019 Iris Kelley, PT GP 1    [id number]  PT THERAPEUTIC ACT EA 15 MIN 10/11/2019 Iris Kelley, PT GP 2          PT G-Codes  Outcome Measure Options: Modified [place], AM-PAC 6 Clicks Basic Mobility (PT)  AM-PAC 6 Clicks Score (PT): 6  Modified [place]ia Scale: 5 - Severe disability.  Bedridden, incontinent, and requiring constant nursing care and attention.    Iris Kelley, PT  10/11/2019

## 2019-10-11 NOTE — PLAN OF CARE
Problem: Patient Care Overview  Goal: Plan of Care Review   10/11/19 1228   Coping/Psychosocial   Plan of Care Reviewed With patient;spouse   OTHER   Outcome Summary 89 yo male adm from IP rehab for dysphagia, wt loss. 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 Pt to have peg tube placed on 10/14/19. Needs to continue skilled PT to work toward increased functional mobility s/p cva.

## 2019-10-11 NOTE — DISCHARGE PLACEMENT REQUEST
"Anuj Escobedo (89 y.o. Male)     Date of Birth Social Security Number Address Home Phone MRN    05/24/1925  [address] [address] [phone] [id number]    Orthodoxy Marital Status          None        Admission Date Admission Type Admitting Provider Attending Provider Department, Room/Bed    10/10/19 Emergency Sarah Guajardo MD Kapadia, Shefali A, MD [facility] 3A MEDICAL INPATIENT, 310/1    Discharge Date Discharge Disposition Discharge Destination                       Attending Provider:  Sarah Guajardo MD    Allergies:  Celecoxib, Ibuprofen    Isolation:  None   Infection:  None   Code Status:  CPR    Ht:  177.8 cm (70\")   Wt:  70.1 kg (154 lb 8.7 oz)    Admission Cmt:  None   Principal Problem:  Dysphagia [R13.10]                 Active Insurance as of 10/10/2019     Primary Coverage     Payor Plan Insurance Group Employer/Plan Group    ANTHEM MEDICARE REPLACEMENT ANTHEM MEDICARE ADVANTAGE [id number]     Payor Plan Address Payor Plan Phone Number Payor Plan Fax Number Effective Dates    [address] [phone]  1/1/2019 - None Entered    [facility] [phone]       Subscriber Name Subscriber Birth Date Member ID       ANUJ ESCOBEDO 5/24/1925 [id number]                 Emergency Contacts      (Rel.) Home Phone Work Phone Mobile Phone    JOSUE ESCOBEDO (Spouse) [phone] -- --               History & Physical      Marisela Kaufman, APRN at 10/10/19 [facility]6          [facility] HOSPITALIST       PCP:  Devon Arriaaz Jr., MD      CHIEF COMPLAINT:     Dysphagia      HISTORY OF PRESENT ILLNESS:    Information obtained from wife and granddaughter at bedside due to patient being very hard of hearing.    This is a 89-year-old  male with a past medical history of CVA, hyperlipidemia, CAD status post CABG, thrush, vitamin D deficiency, chronic atrial fibrillation, hypertension, and MI who [facility] " [facility]10/2019 with complaints of dysphagia.  Per the wife the patient had a stroke on 9/14/2019 and was admitted to [facility].  Patient was then discharged to [facility] and has been there for about 3 weeks.  Patient's wife states that the patient was able to eat some at [facility], however at [facility] he has progressively gotten weaker due to not being able to swallow.  Patient has had left-sided weakness as a result of the stroke.  Per the family last week patient was given a fourth of a tablet of Prozac and went unresponsive for 48 hours at [facility].  Patient has also lost 9 pounds in 10 days due to getting no nutrition.  He was placed on IV fluids at [facility] but was taken off of them due to them causing peripheral edema.  Denies recent nausea, vomiting, diarrhea, fever, chills.    In the ED, vital signs were stable.  GI was consulted.  Dobbhoff tube was ordered.    Past Medical History:   Diagnosis Date   • CAD (coronary artery disease)     s/p CABG 1993 x single vessel   • Chronic atrial fibrillation    • Dyslipidemia    • ED (erectile dysfunction)    • HTN (hypertension)    • Long term current use of anticoagulant therapy    • Myocardial infarction (CMS/HCC)    • Shingles 09/2017   • TIA (transient ischemic attack) 2015     Past Surgical History:   Procedure Laterality Date   • CARDIAC CATHETERIZATION  02/08/1993    RCA with subtotal occlusion with residual thrombus formation at very proximal portion; Lad first diagonal branch with proximal 50% stenosis   • CARPAL TUNNEL RELEASE     • CORONARY ARTERY BYPASS GRAFT  02/16/1993    single coronary artery bypass using saphenous vein to the right coronary artery   • INGUINAL HERNIA REPAIR Right 04/2015    (has had total of 3 hernia repairs)   • ORTHOPEDIC SURGERY       Family History   Problem Relation Age of Onset   • Atrial fibrillation Sister    • Atrial fibrillation Sister      Social History     Tobacco Use   • Smoking status: Former Smoker     Packs/day: 1.00     Types:  "Cigarettes   Substance Use Topics   • Alcohol use: No   • Drug use: Not on file         (Not in a hospital admission)    Allergies:  Celecoxib and Ibuprofen    Immunization History   Administered Date(s) Administered   • Flu Vaccine High Dose PF 65YR+ 10/17/2018   • Pneumococcal Conjugate 13-Valent (PCV13) 10/31/2018         REVIEW OF SYSTEMS:    Review of Systems   HENT: Positive for trouble swallowing.    Respiratory: Negative.    Cardiovascular: Negative.    Gastrointestinal: Negative.    Genitourinary: Negative.    Musculoskeletal:        Left sided weakness    Skin: Negative.    Neurological: Positive for weakness.   Psychiatric/Behavioral: Negative.        Vital Signs  Temp:  [97.5 °F (36.4 °C)] 97.5 °F (36.4 °C)  Heart Rate:  [77-80] 77  Resp:  [16] 16  BP: (110-124)/(71-72) 124/71    Flowsheet Rows      First Filed Value   Admission Height  177.8 cm (70\") Documented at 10/10/2019 1145   Admission Weight  70.8 kg (156 lb) Documented at 10/10/2019 1145           Physical Exam:  Physical Exam   Constitutional: He is oriented to person, place, and time.   Fragile   HENT:   Head: Normocephalic and atraumatic.   Eyes: Conjunctivae and EOM are normal. Pupils are equal, round, and reactive to light.   Neck: Normal range of motion.   Cardiovascular: Normal rate, regular rhythm and normal heart sounds.   S1, S2 audible   Pulmonary/Chest: Effort normal.   Diminished breath sounds bilaterally, on room air    Abdominal: Soft. Bowel sounds are normal.   Neurological: He is alert and oriented to person, place, and time.   Left sided weakness noted, patient unable to follow finger towards the left side of his body following with his eyes    Skin: Skin is warm and dry.   Psychiatric: Mood, memory, affect and judgment normal.   Vitals reviewed.        Results Review:   I reviewed the patient's new clinical results.    Lab Results (most recent)     None          Imaging Results (most recent)     None            ECG/EMG Results " (most recent)     None               Assessment/Plan     Dysphagia  -KUB ordered  -Dobbhoff tube insertion planned  - NPO   -Check chest x-ray to rule out aspiration pneumonia  -Check CBC and CMP  -Protonix ordered IV  -Gentle IV hydration ordered  -GI consulted-possible PEG placement in a couple days  - Nutrition consult     ?  Acute kidney injury  -Check CMP today  -Creatinine 1.4, BUN 29 yesterday  -Baseline creatinine 1.1-1.2  -Avoid nephrotoxic medications  -IV fluids ordered for now    Chronic hypotension  -Controlled  -Per wife patient systolic runs 100-110  -Monitor blood pressure    CAD status post CABG  -Seen by Dr. Ayala outpatient  -On atorvastatin    Chronic A. Fib  -On Eliquis, but held at this time Lovenox ordered pending GI procedure    Hyperlipidemia  -On atorvastatin    Oral thrush  -On Diflucan  -Liliana's Magic mouthwash ordered    History of CVA 9/2019  -Eliquis held, pharmacy to dose Lovenox pending GI procedure  -Patient has residual effects of left-sided weakness, recheck CT head due to patient getting progressively worse and having episode of unresponsiveness last week  -On baclofen for muscle spasms  - PT/OT ordered     BPH  -On Flomax    Depression  -On Zoloft    Vitamin D deficiency  -On vitamin D    History of of squamous cell carcinoma    History of MI    VTE Prophylaxis -Lovenox                  Electronically signed by Marisela Kaufman APRN at 10/10/19 [address] [place] [address] Medications (active)       Dose Frequency Start End    Adult Central Clinimix TPN  Every 24 Hours Scheduled (TPN) 10/11/2019 10/12/2019    Sig - Route: Infuse  into a venous catheter Q24H (TPN). - Intravenous    aluminum-magnesium hydroxide-simethicone (MAALOX MAX) 400-400-40 MG/5ML suspension 15 mL 15 mL Every 6 Hours PRN 10/10/2019     Sig - Route: Take 15 mL by mouth Every 6 (Six) Hours As Needed for Heartburn. - Oral    atorvastatin (LIPITOR) tablet 5 mg 5 mg Nightly 10/10/2019     Sig - Route: Take 0.5 tablets  by mouth Every Night. - Oral    baclofen (LIORESAL) tablet 10 mg 10 mg Nightly PRN 10/10/2019     Sig - Route: Take 1 tablet by mouth At Night As Needed for Muscle Spasms. - Oral    BENEPROTEIN packet 1 packet 1 packet Daily 10/12/2019     Sig - Route: 1 packet by Nasogastric route Daily. - Nasogastric    bisacodyl (DULCOLAX) suppository 10 mg 10 mg Daily PRN 10/10/2019     Sig - Route: Insert 1 suppository into the rectum Daily As Needed for Constipation. - Rectal    cholecalciferol (VITAMIN D3) tablet 1,000 Units 1,000 Units Daily 10/11/2019     Sig - Route: Take 1 tablet by mouth Daily. - Oral    enoxaparin (LOVENOX) syringe 70 mg 70 mg Every 12 Hours 10/10/2019 10/14/2019    Sig - Route: Inject 0.7 mL under the skin into the appropriate area as directed Every 12 (Twelve) Hours. - Subcutaneous    fluconazole (DIFLUCAN) tablet 100 mg 100 mg Daily 10/11/2019 10/25/2019    Sig - Route: Take 1 tablet by mouth Daily. - Oral    hydrALAZINE (APRESOLINE) injection 10 mg 10 mg Every 6 Hours PRN 10/10/2019     Sig - Route: Infuse 0.5 mL into a venous catheter Every 6 (Six) Hours As Needed for High Blood Pressure. - Intravenous    hydrocortisone-diphenhydramine-nystatin (MAGIC MOUTHWASH) suspension 15 mL 15 mL 4 Times Daily 10/10/2019     Sig - Route: Swish and spit 15 mL 4 (Four) Times a Day. - Swish & Spit    influenza vac split quad (FLUZONE,FLUARIX,AFLURIA) injection 0.5 mL 0.5 mL During Hospitalization 10/10/2019     Sig - Route: Inject 0.5 mL into the appropriate muscle as directed by prescriber During Hospitalization for Immunization. - Intramuscular    magnesium hydroxide (MILK OF MAGNESIA) suspension 2400 mg/10mL 10 mL 10 mL Daily PRN 10/10/2019     Sig - Route: Take 10 mL by mouth Daily As Needed for Constipation. - Oral    melatonin tablet 5 mg 5 mg Nightly PRN 10/10/2019     Sig - Route: Take 1 tablet by mouth At Night As Needed for Sleep. - Oral    ondansetron (ZOFRAN) injection 4 mg 4 mg Every 6 Hours PRN  "10/10/2019     Sig - Route: Infuse 2 mL into a venous catheter Every 6 (Six) Hours As Needed for Nausea or Vomiting. - Intravenous    Linked Group 1:  \"Or\" Linked Group Details        ondansetron (ZOFRAN) tablet 4 mg 4 mg Every 6 Hours PRN 10/10/2019     Sig - Route: Take 1 tablet by mouth Every 6 (Six) Hours As Needed for Nausea or Vomiting. - Oral    Linked Group 1:  \"Or\" Linked Group Details        pantoprazole (PROTONIX) injection 40 mg 40 mg Every Early Morning 10/11/2019     Sig - Route: Infuse 10 mL into a venous catheter Every Morning. - Intravenous    Pharmacy Consult - Pharmacy to dose  Continuous PRN 10/10/2019     Sig - Route: Continuous As Needed for Consult. - Does not apply    Notes to Pharmacy: From GI note: Plan to hold Eliquis with Lovenox therapeutic dosing with last dose to be on Sunday, 10/13, for EGD with PEG tube placement on Monday, 10/14.    Pharmacy to Dose enoxaparin (LOVENOX)  Continuous PRN 10/10/2019     Sig - Route: Continuous As Needed for Consult. - Does not apply    Pharmacy to Dose TPN  Continuous PRN 10/11/2019     Sig - Route: Continuous As Needed for Consult. - Does not apply    potassium chloride (K-DUR,KLOR-CON) CR tablet 40 mEq 40 mEq As Needed 10/10/2019     Sig - Route: Take 2 tablets by mouth As Needed (Potassium Replacement.  See Admin Instructions). - Oral    Linked Group 2:  \"Or\" Linked Group Details        potassium chloride (KLOR-CON) packet 40 mEq 40 mEq As Needed 10/10/2019     Sig - Route: Take 40 mEq by mouth As Needed (potassium replacement, see admin instructions). - Oral    Linked Group 2:  \"Or\" Linked Group Details        potassium chloride 10 mEq in 100 mL IVPB 10 mEq Every 1 Hour PRN 10/10/2019     Sig - Route: Infuse 100 mL into a venous catheter Every 1 (One) Hour As Needed (Potassium Replacement - See Admin Instructions). - Intravenous    Linked Group 2:  \"Or\" Linked Group Details        sertraline (ZOLOFT) tablet 25 mg 25 mg Nightly 10/10/2019     Sig - " "Route: Take 0.5 tablets by mouth Every Night. - Oral    sodium chloride 0.9 % flush 10 mL 10 mL As Needed 10/10/2019     Sig - Route: Infuse 10 mL into a venous catheter As Needed for Line Care. - Intravenous    sodium chloride 0.9 % flush 10 mL 10 mL Every 12 Hours Scheduled 10/10/2019     Sig - Route: Infuse 10 mL into a venous catheter Every 12 (Twelve) Hours. - Intravenous    sodium chloride 0.9 % flush 10 mL 10 mL As Needed 10/10/2019     Sig - Route: Infuse 10 mL into a venous catheter As Needed for Line Care. - Intravenous    tamsulosin (FLOMAX) 24 hr capsule 0.4 mg 0.4 mg Nightly 10/10/2019     Sig - Route: Take 1 capsule by mouth Every Night. - Oral    Adult Central Clinimix TPN (Discontinued)  Every 24 Hours Scheduled (TPN) 10/11/2019 10/11/2019    Sig - Route: Infuse  into a venous catheter Q24H (TPN). - Intravenous    Magnesium Sulfate 1 gram infusion - Mg 1.6-1.9 mg/dL (Discontinued) 1 g As Needed 10/10/2019 10/11/2019    Sig - Route: Infuse 100 mL into a venous catheter As Needed (Mg 1.6-1.9 mg/dL.). - Intravenous    Linked Group 3:  \"Or\" Linked Group Details        Magnesium Sulfate 2 gram infusion - Mg less than or equal to 1.5 mg/dL (Discontinued) 2 g As Needed 10/10/2019 10/11/2019    Sig - Route: Infuse 50 mL into a venous catheter As Needed (for Mg less than or equal 1.5 mg/dL). - Intravenous    Linked Group 3:  \"Or\" Linked Group Details        sodium chloride 0.9 % flush 3 mL (Discontinued) 3 mL Every 12 Hours Scheduled 10/10/2019 10/11/2019    Sig - Route: Infuse 3 mL into a venous catheter Every 12 (Twelve) Hours. - Intravenous    sodium chloride 0.9 % infusion (Discontinued) 75 mL/hr Continuous 10/10/2019 10/11/2019    Sig - Route: Infuse 75 mL/hr into a venous catheter Continuous. - Intravenous             Physician Progress Notes (last 24 hours) (Notes from 10/10/19 1728 through 10/11/19 1728)      Mony Serrato APRN at 10/11/19 1045     Attestation signed by Juan Antonio Khan MD at " 10/11/19 1135    I have reviewed the documentation above and agree.                    Wound Initial Evaluation  DENNIS CALLEJAS     Patient Name: Anuj Escobedo  : 1925  MRN: [id number]  Today's Date: 10/11/2019 Room Number: 310/1      Admit Date: 10/10/2019  Attending: Sarah Guajardo MD    Consult Requested By: Dr. Guajardo    Reason For Consult: Multiple abrasions to buttock and thigh    Chief Complaint: Patient opens eyes but he is not able to respond to provide a history.  His son is at the bedside and reports that there are no known falls or trauma he believes that the areas of been present for several weeks and started when he was in the hospital.  He is currently on an SAT surface    Visit Dx:    ICD-10-CM ICD-9-CM   1. Dysphagia, unspecified type R13.10 787.20   2. Oropharyngeal dysphagia R13.12 787.22     Patient Active Problem List   Diagnosis   • Dysphagia   • Atrial fibrillation, chronic   • Dyslipidemia   • Degenerative arthritis   • Coronary artery disease   • Benign prostatic hyperplasia   • Hypertension   • Long term current use of anticoagulant therapy   • Mixed hyperlipidemia   • Peripheral edema   • Primary malignant neoplasm of head (CMS/HCC)   • Shingles   • Oropharyngeal dysphagia   • Abrasions of multiple sites       History:   Past Medical History:   Diagnosis Date   • CAD (coronary artery disease)     s/p CABG  x single vessel   • Chronic atrial fibrillation    • Dyslipidemia    • ED (erectile dysfunction)    • HTN (hypertension)    • Long term current use of anticoagulant therapy    • Myocardial infarction (CMS/HCC)    • Shingles 2017   • TIA (transient ischemic attack)      Past Surgical History:   Procedure Laterality Date   • CARDIAC CATHETERIZATION  1993    RCA with subtotal occlusion with residual thrombus formation at very proximal portion; Lad first diagonal branch with proximal 50% stenosis   • CARPAL TUNNEL RELEASE     • CORONARY ARTERY BYPASS GRAFT  1993     single coronary artery bypass using saphenous vein to the right coronary artery   • INGUINAL HERNIA REPAIR Right 04/2015    (has had total of 3 hernia repairs)   • ORTHOPEDIC SURGERY       Social History     Socioeconomic History   • Marital status:      Spouse name: Not on file   • Number of children: Not on file   • Years of education: Not on file   • Highest education level: Not on file   Tobacco Use   • Smoking status: Former Smoker     Packs/day: 1.00     Types: Cigarettes   Substance and Sexual Activity   • Alcohol use: No       Allergies:  Allergies   Allergen Reactions   • Celecoxib Unknown (See Comments)   • Ibuprofen Unknown (See Comments)       Medications:    Current Facility-Administered Medications:   •  aluminum-magnesium hydroxide-simethicone (MAALOX MAX) 400-400-40 MG/5ML suspension 15 mL, 15 mL, Oral, Q6H PRN, Marisela Kaufman APRN  •  atorvastatin (LIPITOR) tablet 5 mg, 5 mg, Oral, Nightly, Marisela Kaufman APRN  •  baclofen (LIORESAL) tablet 10 mg, 10 mg, Oral, Nightly PRN, Marisela Kaufman APRN  •  [START ON 10/12/2019] BENEPROTEIN packet 1 packet, 1 packet, Nasogastric, Daily, Sabine Linton, RD  •  bisacodyl (DULCOLAX) suppository 10 mg, 10 mg, Rectal, Daily PRN, Marisela Kaufman APRN  •  cholecalciferol (VITAMIN D3) tablet 1,000 Units, 1,000 Units, Oral, Daily, Marisela Kaufman, APRN  •  enoxaparin (LOVENOX) syringe 70 mg, 70 mg, Subcutaneous, Q12H, Lashay Snyder, APRN, 70 mg at 10/11/19 0729  •  fluconazole (DIFLUCAN) tablet 100 mg, 100 mg, Oral, Daily, Marisela Kaufman APRN  •  hydrALAZINE (APRESOLINE) injection 10 mg, 10 mg, Intravenous, Q6H PRN, Marisela Kaufman, APRN  •  hydrocortisone-diphenhydramine-nystatin (MAGIC MOUTHWASH) suspension 15 mL, 15 mL, Swish & Spit, 4x Daily, Marisela Kaufman APRALEKSANDR  •  influenza vac split quad (FLUZONE,FLUARIX,AFLURIA) injection 0.5 mL, 0.5 mL, Intramuscular, During Hospitalization, Sarah Guajardo MD  •  magnesium  hydroxide (MILK OF MAGNESIA) suspension 2400 mg/10mL 10 mL, 10 mL, Oral, Daily PRN, Orquidea Kaufmansea RADAMES, APRN  •  Magnesium Sulfate 2 gram infusion - Mg less than or equal to 1.5 mg/dL, 2 g, Intravenous, PRN **OR** Magnesium Sulfate 1 gram infusion - Mg 1.6-1.9 mg/dL, 1 g, Intravenous, PRN, Orquidea Kaufmansea RADAMES, APRN  •  melatonin tablet 5 mg, 5 mg, Oral, Nightly PRN, DavidsOrquideaMarisela RADAMES, APRN  •  ondansetron (ZOFRAN) tablet 4 mg, 4 mg, Oral, Q6H PRN **OR** ondansetron (ZOFRAN) injection 4 mg, 4 mg, Intravenous, Q6H PRN, Orquidea Kaufmansea RADAMES, APRN  •  pantoprazole (PROTONIX) injection 40 mg, 40 mg, Intravenous, Q AM, Marisela Kaufman, APRN, 40 mg at 10/11/19 0728  •  Pharmacy Consult - Pharmacy to dose, , Does not apply, Continuous PRN, Lashay Snyder, APRN  •  Pharmacy to Dose enoxaparin (LOVENOX), , Does not apply, Continuous PRN, Marisela Kaufman, APRN  •  potassium chloride (K-DUR,KLOR-CON) CR tablet 40 mEq, 40 mEq, Oral, PRN **OR** potassium chloride (KLOR-CON) packet 40 mEq, 40 mEq, Oral, PRN **OR** potassium chloride 10 mEq in 100 mL IVPB, 10 mEq, Intravenous, Q1H PRN, Marisela Kaufman, APRN  •  sertraline (ZOLOFT) tablet 25 mg, 25 mg, Oral, Nightly, Orquidea Kaufmansea RADAMES, APRN  •  sodium chloride 0.9 % flush 10 mL, 10 mL, Intravenous, PRN, Kamran Maldonado MD  •  sodium chloride 0.9 % flush 10 mL, 10 mL, Intravenous, Q12H, Cheyenne Kaufmana RADAMES, APRN, 10 mL at 10/11/19 0729  •  sodium chloride 0.9 % flush 10 mL, 10 mL, Intravenous, PRN, Marisela Kaufman APRN  •  sodium chloride 0.9 % infusion, 75 mL/hr, Intravenous, Continuous, Marisela Kaufman APRN, Last Rate: 75 mL/hr at 10/11/19 0733, 75 mL/hr at 10/11/19 0733  •  tamsulosin (FLOMAX) 24 hr capsule 0.4 mg, 0.4 mg, Oral, Nightly, Marisela Kaufman APRN    Results Review:  Lab Results (last 48 hours)     Procedure Component Value Units Date/Time    Magnesium [[id number]]  (Normal) Collected:  10/11/19 0732    Specimen:  Blood Updated:  10/11/19  0824     Magnesium 2.4 mg/dL     Basic Metabolic Panel [[id number]]  (Abnormal) Collected:  10/11/19 0732    Specimen:  Blood Updated:  10/11/19 0824     Glucose 79 mg/dL      BUN 32 mg/dL      Creatinine 1.20 mg/dL      Sodium 151 mmol/L      Potassium 3.9 mmol/L      Chloride 114 mmol/L      CO2 27.0 mmol/L      Calcium 9.5 mg/dL      eGFR Non African Amer 56 mL/min/1.73      BUN/Creatinine Ratio 26.7     Anion Gap 13.9 mmol/L     Narrative:       The MDRD GFR formula is only valid for adults with stable renal function between ages 18 and 70.    CBC Auto Differential [[id number]]  (Abnormal) Collected:  10/11/19 0732    Specimen:  Blood Updated:  10/11/19 0754     WBC 7.50 10*3/mm3      RBC 4.92 10*6/mm3      Hemoglobin 15.3 g/dL      Hematocrit 46.2 %      MCV 93.9 fL      MCH 31.0 pg      MCHC 33.1 g/dL      RDW 15.7 %      RDW-SD 52.5 fl      MPV 8.3 fL      Platelets 157 10*3/mm3      Neutrophil % 76.6 %      Lymphocyte % 13.7 %      Monocyte % 9.1 %      Eosinophil % 0.4 %      Basophil % 0.2 %      Neutrophils, Absolute 5.80 10*3/mm3      Lymphocytes, Absolute 1.00 10*3/mm3      Monocytes, Absolute 0.70 10*3/mm3      Eosinophils, Absolute 0.00 10*3/mm3      Basophils, Absolute 0.00 10*3/mm3      nRBC 0.0 /100 WBC     Comprehensive Metabolic Panel [[id number]]  (Abnormal) Collected:  10/10/19 1440    Specimen:  Blood Updated:  10/10/19 1531     Glucose 109 mg/dL      BUN 29 mg/dL      Creatinine 1.30 mg/dL      Sodium 148 mmol/L      Potassium 4.6 mmol/L      Chloride 110 mmol/L      CO2 30.0 mmol/L      Calcium 10.3 mg/dL      Total Protein 7.2 g/dL      Albumin 3.10 g/dL      ALT (SGPT) 25 U/L      AST (SGOT) 31 U/L      Alkaline Phosphatase 81 U/L      Total Bilirubin 2.6 mg/dL      eGFR Non African Amer 51 mL/min/1.73      Globulin 4.1 gm/dL      A/G Ratio 0.8 g/dL      BUN/Creatinine Ratio 22.3     Anion Gap 12.6 mmol/L     Narrative:       The MDRD GFR formula is only valid for adults with stable renal  function between ages 18 and 70.    Protime-INR [[id number]]  (Abnormal) Collected:  10/10/19 1505    Specimen:  Blood Updated:  10/10/19 1531     Protime 14.3 Seconds      INR 1.45    CBC & Differential [[id number]] Collected:  10/10/19 1440    Specimen:  Blood Updated:  10/10/19 1447    Narrative:       The following orders were created for panel order CBC & Differential.  Procedure                               Abnormality         Status                     ---------                               -----------         ------                     CBC Auto Differential[[id number]]        Abnormal            Final result                 Please view results for these tests on the individual orders.    CBC Auto Differential [[id number]]  (Abnormal) Collected:  10/10/19 1440    Specimen:  Blood Updated:  10/10/19 1447     WBC 10.20 10*3/mm3      RBC 5.39 10*6/mm3      Hemoglobin 16.7 g/dL      Hematocrit 49.5 %      MCV 91.9 fL      MCH 30.9 pg      MCHC 33.6 g/dL      RDW 15.5 %      RDW-SD 49.9 fl      MPV 7.8 fL      Platelets 193 10*3/mm3      Neutrophil % 82.6 %      Lymphocyte % 9.3 %      Monocyte % 7.6 %      Eosinophil % 0.2 %      Basophil % 0.3 %      Neutrophils, Absolute 8.40 10*3/mm3      Lymphocytes, Absolute 0.90 10*3/mm3      Monocytes, Absolute 0.80 10*3/mm3      Eosinophils, Absolute 0.00 10*3/mm3      Basophils, Absolute 0.00 10*3/mm3      nRBC 0.0 /100 WBC         Imaging Results (last 72 hours)     Procedure Component Value Units Date/Time    XR Chest PA & Lateral [[id number]] Collected:  10/10/19 2205     Updated:  10/10/19 2211    Narrative:       Examination: XR CHEST PA AND LATERAL-     Date of Exam: 10/10/2019 7:45 PM     Indication: rule out aspiration pneumonia; R13.10-Dysphagia,  unspecified; R13.12-Dysphagia, oropharyngeal phase.     Comparison: 03/22/2019     Technique: 2 radiographic views of the chest were obtained.     Findings:  There is mild cardiomegaly. There has been a median sternotomy  and  coronary artery bypass. There is rotation and tortuosity of the aorta.  Dilatation of the aorta is not excluded. There is mild airspace opacity  in the left lower lung. Right lung is clear. No significant bone  abnormality is noted.       Impression:       1. Mild left basal airspace opacity could be due to pneumonia or  aspiration.  2. Cardiomegaly.  3. Aortic calcification and tortuosity. Aortic dilatation is not  excluded.     Electronically Signed By-Nya Baez On:10/10/2019 10:09 PM  This report was finalized on [id number] by  Nya Baez, .    CT Head Without Contrast [[id number]] Collected:  10/10/19 1634     Updated:  10/10/19 1640    Narrative:          DATE OF EXAM:  10/10/2019 3:36 PM     PROCEDURE:   CT HEAD WO CONTRAST-     INDICATIONS:   Focal neuro deficit, difficulty swallowing.     COMPARISON:  No Comparisons Available     TECHNIQUE:   Routine transaxial cuts were obtained through the head without the  administration of contrast. Automated exposure control and iterative  reconstruction methods were used.      FINDINGS:  There is enlargement of the cerebral sulci, fissures, ventricles, and  basal cisterns consistent with advanced cerebral atrophy. There are  areas of decreased density in the white matter tracks which is  nonspecific, but usually indicates chronic microvascular ischemia. There  are old lacunar infarcts seen in the basal ganglia bilaterally. There is  no acute hemorrhage, midline shift, or suspicious extra-axial fluid  collections. The orbital contents are normal. The paranasal and mastoid  sinuses are clear.        Impression:          1. Advanced cerebral atrophy.  2. Chronic ischemic changes.  3. No acute findings.     Electronically Signed By-Juancho Parham On:10/10/2019 4:38 PM  This report was finalized on [id number] by  Juancho Parham, .          Review of Systems:  Review of Systems   Unable to perform ROS: Mental status change       Physical Assessment:                                                                  Multiple abrasions occurring to the right buttock, thigh and hip area as well as to the left lower extremity.  The morphology of the abrasions are linear in nature and would be inconsistent with a pressure injury nor are or the areas over a bony prominence.  So it is unclear as to the etiology of the wounds but they are all partial-thickness and in various stages of healing.  The base of the injuries are pink and clean with scant serous exudate  to the areas and there are no symptoms of infection noted.    Recommendation and Plan  Multiple abrasions, recommend to protect the areas and apply silicone foam border island dressings, this will also help decrease friction and shear to the areas.    Mony Serrato, ESPERANZA   10/11/2019   10:45 AM      Electronically signed by Juan Antonio Khan MD at 10/11/19 1132     Annette Phan APRN at 10/11/19 0508     Attestation signed by Kamran Maldonado MD at 10/11/19 4497    I have reviewed the documentation above and agree.  Dobbhoff placement failed despite attempts by multiple experience nurses and by radiology under fluoroscopy.  Patient without any other complaints.  On exam the patient is in no distress.  I spoke with the family and offered endoscopic placement of a Dobbhoff versus PICC line for TPN.  The family would prefer TPN at this time.  Ordered PICC team to place a line and TPN.  He is on the schedule for EGD with PEG tube placement on 1014.  Eliquis is now on hold.  He is receiving therapeutic Lovenox, dosing per pharmacy, appreciate their assistance.  Will see over the weekend as needed.                     LOS: 1 day   Patient Care Team:  Devon Arriaza Jr., MD as PCP - General      Subjective    None     Interval History:   Nursing staff unable to place DHT, IR consulted to place. If they are unable then we will consider placing it endoscopically.     ROS:   Left sided weakness-chronic  No chest pain,  shortness of breath, or cough.       Medication Review:     Current Facility-Administered Medications:   •  aluminum-magnesium hydroxide-simethicone (MAALOX MAX) 400-400-40 MG/5ML suspension 15 mL, 15 mL, Oral, Q6H PRN, Marisela Kaufman, APRN  •  atorvastatin (LIPITOR) tablet 5 mg, 5 mg, Oral, Nightly, Marisela Kaufman, APRN  •  baclofen (LIORESAL) tablet 10 mg, 10 mg, Oral, Nightly PRN, Marisela Kaufman APRN  •  [START ON 10/12/2019] BENEPROTEIN packet 1 packet, 1 packet, Nasogastric, Daily, Sabine Linton, LEO  •  bisacodyl (DULCOLAX) suppository 10 mg, 10 mg, Rectal, Daily PRN, Marisela aKufman, APRN  •  cholecalciferol (VITAMIN D3) tablet 1,000 Units, 1,000 Units, Oral, Daily, Marisela Kaufman APRN  •  enoxaparin (LOVENOX) syringe 70 mg, 70 mg, Subcutaneous, Q12H, Lashay Snyder, APRN, 70 mg at 10/11/19 0729  •  fluconazole (DIFLUCAN) tablet 100 mg, 100 mg, Oral, Daily, Marisela Kaufman APRN  •  hydrALAZINE (APRESOLINE) injection 10 mg, 10 mg, Intravenous, Q6H PRN, Marisela Kaufman, APRN  •  hydrocortisone-diphenhydramine-nystatin (MAGIC MOUTHWASH) suspension 15 mL, 15 mL, Swish & Spit, 4x Daily, Marisela Kaufman APRN  •  influenza vac split quad (FLUZONE,FLUARIX,AFLURIA) injection 0.5 mL, 0.5 mL, Intramuscular, During Hospitalization, Sarah Guajardo MD  •  magnesium hydroxide (MILK OF MAGNESIA) suspension 2400 mg/10mL 10 mL, 10 mL, Oral, Daily PRN, Marisela Kaufman, APRN  •  Magnesium Sulfate 2 gram infusion - Mg less than or equal to 1.5 mg/dL, 2 g, Intravenous, PRN **OR** Magnesium Sulfate 1 gram infusion - Mg 1.6-1.9 mg/dL, 1 g, Intravenous, PRN, Marisela Kaufman, APRALEKSANDR  •  melatonin tablet 5 mg, 5 mg, Oral, Nightly PRN, Marisela Kaufman APRN  •  ondansetron (ZOFRAN) tablet 4 mg, 4 mg, Oral, Q6H PRN **OR** ondansetron (ZOFRAN) injection 4 mg, 4 mg, Intravenous, Q6H PRN, Marisela Kaufman APRN  •  pantoprazole (PROTONIX) injection 40 mg, 40 mg, Intravenous, Q AM, Shae,  Marisela Z, APRN, 40 mg at 10/11/19 0728  •  Pharmacy Consult - Pharmacy to dose, , Does not apply, Continuous PRN, Lashay Snyder APRN  •  Pharmacy to Dose enoxaparin (LOVENOX), , Does not apply, Continuous PRN, Kellams, Marisela Z, APRN  •  potassium chloride (K-DUR,KLOR-CON) CR tablet 40 mEq, 40 mEq, Oral, PRN **OR** potassium chloride (KLOR-CON) packet 40 mEq, 40 mEq, Oral, PRN **OR** potassium chloride 10 mEq in 100 mL IVPB, 10 mEq, Intravenous, Q1H PRN, Kellams, Marisela Z, APRN  •  sertraline (ZOLOFT) tablet 25 mg, 25 mg, Oral, Nightly, Kellams, Marisela Z, APRN  •  sodium chloride 0.9 % flush 10 mL, 10 mL, Intravenous, PRN, Kamran Maldonado MD  •  sodium chloride 0.9 % flush 10 mL, 10 mL, Intravenous, Q12H, Kellams, Marisela Z, APRN, 10 mL at 10/11/19 0729  •  sodium chloride 0.9 % flush 10 mL, 10 mL, Intravenous, PRN, Kellams, Marisela Z, APRN  •  sodium chloride 0.9 % infusion, 75 mL/hr, Intravenous, Continuous, Kellams, Marisela Z, APRN, Last Rate: 75 mL/hr at 10/11/19 0733, 75 mL/hr at 10/11/19 0733  •  tamsulosin (FLOMAX) 24 hr capsule 0.4 mg, 0.4 mg, Oral, Nightly, Kellams, Marisela Z, APRN      Objective    Resting in hospital bed. Family at bedside    Vital Signs  Temp:  [97.3 °F (36.3 °C)-97.9 °F (36.6 °C)] 97.5 °F (36.4 °C)  Heart Rate:  [67-80] 76  Resp:  [13-16] 14  BP: (106-149)/(57-89) 133/57  Physical Exam:    General Appearance:    in no acute distress   Head:    Normocephalic, without obvious abnormality   Eyes:          Conjunctivae normal, anicteric sclera   Ears:    Hearing intact   Throat:   No oral lesions, no thrush, oral mucosa moist   Neck:   No adenopathy, supple, no JVD   Lungs:     Clear to auscultation bilaterally, respirations regular, even and unlabored    Heart:    Regular rhythm and normal rate, normal S1 and S2, no            murmur, no gallop, no rub   Abdomen:     Normal bowel sounds, soft, non-tender, no rebound or guarding, non-distended, no hepatosplenomegaly    Rectal:     Deferred   Extremities:   No edema, no cyanosis, no redness   Skin:   No bleeding, bruising or rash, no jaundice   Neurologic:   Cranial nerves 2 - 12 grossly intact, no asterixis, sensation   intact        Results Review:    Lab Results (last 24 hours)     Procedure Component Value Units Date/Time    Magnesium [[id number]]  (Normal) Collected:  10/11/19 0732    Specimen:  Blood Updated:  10/11/19 0824     Magnesium 2.4 mg/dL     Basic Metabolic Panel [[id number]]  (Abnormal) Collected:  10/11/19 0732    Specimen:  Blood Updated:  10/11/19 0824     Glucose 79 mg/dL      BUN 32 mg/dL      Creatinine 1.20 mg/dL      Sodium 151 mmol/L      Potassium 3.9 mmol/L      Chloride 114 mmol/L      CO2 27.0 mmol/L      Calcium 9.5 mg/dL      eGFR Non African Amer 56 mL/min/1.73      BUN/Creatinine Ratio 26.7     Anion Gap 13.9 mmol/L     Narrative:       The MDRD GFR formula is only valid for adults with stable renal function between ages 18 and 70.    CBC Auto Differential [[id number]]  (Abnormal) Collected:  10/11/19 0732    Specimen:  Blood Updated:  10/11/19 0754     WBC 7.50 10*3/mm3      RBC 4.92 10*6/mm3      Hemoglobin 15.3 g/dL      Hematocrit 46.2 %      MCV 93.9 fL      MCH 31.0 pg      MCHC 33.1 g/dL      RDW 15.7 %      RDW-SD 52.5 fl      MPV 8.3 fL      Platelets 157 10*3/mm3      Neutrophil % 76.6 %      Lymphocyte % 13.7 %      Monocyte % 9.1 %      Eosinophil % 0.4 %      Basophil % 0.2 %      Neutrophils, Absolute 5.80 10*3/mm3      Lymphocytes, Absolute 1.00 10*3/mm3      Monocytes, Absolute 0.70 10*3/mm3      Eosinophils, Absolute 0.00 10*3/mm3      Basophils, Absolute 0.00 10*3/mm3      nRBC 0.0 /100 WBC     Comprehensive Metabolic Panel [[id number]]  (Abnormal) Collected:  10/10/19 1440    Specimen:  Blood Updated:  10/10/19 1531     Glucose 109 mg/dL      BUN 29 mg/dL      Creatinine 1.30 mg/dL      Sodium 148 mmol/L      Potassium 4.6 mmol/L      Chloride 110 mmol/L      CO2 30.0 mmol/L       Calcium 10.3 mg/dL      Total Protein 7.2 g/dL      Albumin 3.10 g/dL      ALT (SGPT) 25 U/L      AST (SGOT) 31 U/L      Alkaline Phosphatase 81 U/L      Total Bilirubin 2.6 mg/dL      eGFR Non African Amer 51 mL/min/1.73      Globulin 4.1 gm/dL      A/G Ratio 0.8 g/dL      BUN/Creatinine Ratio 22.3     Anion Gap 12.6 mmol/L     Narrative:       The MDRD GFR formula is only valid for adults with stable renal function between ages 18 and 70.    Protime-INR [[id number]]  (Abnormal) Collected:  10/10/19 1505    Specimen:  Blood Updated:  10/10/19 1531     Protime 14.3 Seconds      INR 1.45    CBC & Differential [[id number]] Collected:  10/10/19 1440    Specimen:  Blood Updated:  10/10/19 1447    Narrative:       The following orders were created for panel order CBC & Differential.  Procedure                               Abnormality         Status                     ---------                               -----------         ------                     CBC Auto Differential[[id number]]        Abnormal            Final result                 Please view results for these tests on the individual orders.    CBC Auto Differential [[id number]]  (Abnormal) Collected:  10/10/19 1440    Specimen:  Blood Updated:  10/10/19 1447     WBC 10.20 10*3/mm3      RBC 5.39 10*6/mm3      Hemoglobin 16.7 g/dL      Hematocrit 49.5 %      MCV 91.9 fL      MCH 30.9 pg      MCHC 33.6 g/dL      RDW 15.5 %      RDW-SD 49.9 fl      MPV 7.8 fL      Platelets 193 10*3/mm3      Neutrophil % 82.6 %      Lymphocyte % 9.3 %      Monocyte % 7.6 %      Eosinophil % 0.2 %      Basophil % 0.3 %      Neutrophils, Absolute 8.40 10*3/mm3      Lymphocytes, Absolute 0.90 10*3/mm3      Monocytes, Absolute 0.80 10*3/mm3      Eosinophils, Absolute 0.00 10*3/mm3      Basophils, Absolute 0.00 10*3/mm3      nRBC 0.0 /100 WBC           Imaging Results (last 24 hours)     Procedure Component Value Units Date/Time    XR Chest PA & Lateral [[id number]] Collected:  10/10/19  2205     Updated:  10/10/19 2211    Narrative:       Examination: XR CHEST PA AND LATERAL-     Date of Exam: 10/10/2019 7:45 PM     Indication: rule out aspiration pneumonia; R13.10-Dysphagia,  unspecified; R13.12-Dysphagia, oropharyngeal phase.     Comparison: 03/22/2019     Technique: 2 radiographic views of the chest were obtained.     Findings:  There is mild cardiomegaly. There has been a median sternotomy and  coronary artery bypass. There is rotation and tortuosity of the aorta.  Dilatation of the aorta is not excluded. There is mild airspace opacity  in the left lower lung. Right lung is clear. No significant bone  abnormality is noted.       Impression:       1. Mild left basal airspace opacity could be due to pneumonia or  aspiration.  2. Cardiomegaly.  3. Aortic calcification and tortuosity. Aortic dilatation is not  excluded.     Electronically Signed By-Nya Baez On:10/10/2019 10:09 PM  This report was finalized on [id number] by  Nya Baez, .    CT Head Without Contrast [[id number]] Collected:  10/10/19 1634     Updated:  10/10/19 1640    Narrative:          DATE OF EXAM:  10/10/2019 3:36 PM     PROCEDURE:   CT HEAD WO CONTRAST-     INDICATIONS:   Focal neuro deficit, difficulty swallowing.     COMPARISON:  No Comparisons Available     TECHNIQUE:   Routine transaxial cuts were obtained through the head without the  administration of contrast. Automated exposure control and iterative  reconstruction methods were used.      FINDINGS:  There is enlargement of the cerebral sulci, fissures, ventricles, and  basal cisterns consistent with advanced cerebral atrophy. There are  areas of decreased density in the white matter tracks which is  nonspecific, but usually indicates chronic microvascular ischemia. There  are old lacunar infarcts seen in the basal ganglia bilaterally. There is  no acute hemorrhage, midline shift, or suspicious extra-axial fluid  collections. The orbital contents are normal. The  paranasal and mastoid  sinuses are clear.        Impression:          1. Advanced cerebral atrophy.  2. Chronic ischemic changes.  3. No acute findings.     Electronically Signed By-Juancho Parham On:10/10/2019 4:38 PM  This report was finalized on [id number] by  Juancho Parham, .            Assessment/Plan   Evaluation for PEG placement  Dysphasia  Unintentional weight loss  Recent CVA-on Eliquis  Atrial fibrillation  Coronary artery disease status post CABG  Hypertension  Weight loss   Hx of cholecystectomy     Plan:  Plan DHT placement today by IR or possibly endoscopic vs TPN  Eliquis is on hold & on lovenox.   Speech is in room working with patient, he is on nectar thickened liquids.   Tentatively planned for PEG 10/13/19      Annette Phan, APRN  10/11/19  9:37 AM            Electronically signed by Kamran Maldonado MD at 10/11/19 1514       Consult Notes (last 24 hours) (Notes from 10/10/19 1728 through 10/11/19 172)     No notes of this type exist for this encounter.           Physical Therapy Notes (last 24 hours) (Notes from 10/10/19 1728 through 10/11/19 172)      Iris Kelley, PT at 10/11/19 1229  Version 1 of 1         Problem: Patient Care Overview  Goal: Plan of Care Review   10/11/19 1228   Coping/Psychosocial   Plan of Care Reviewed With patient;spouse   OTHER   Outcome Summary 89 yo male adm from IP rehab for dysphagia, wt loss. 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 Pt to have peg tube placed on 10/14/19. Needs to continue skilled PT to work toward increased functional mobility s/p cva.           Electronically signed by Iris Kelley PT at 10/11/19 1229     Iris Kelley PT at 10/11/19 1232  Version 1 of 1         Patient Name: Anuj Escobedo  : 1925    MRN: [id number]                              Today's Date: 10/11/2019       Admit Date: 10/10/2019    Visit Dx:     ICD-10-CM ICD-9-CM   1. Dysphagia, unspecified type R13.10 787.20   2. Oropharyngeal dysphagia R13.12  787.22     Patient Active Problem List   Diagnosis   • Dysphagia   • Atrial fibrillation, chronic   • Dyslipidemia   • Degenerative arthritis   • Coronary artery disease   • Benign prostatic hyperplasia   • Hypertension   • Long term current use of anticoagulant therapy   • Mixed hyperlipidemia   • Peripheral edema   • Primary malignant neoplasm of head (CMS/HCC)   • Shingles   • Oropharyngeal dysphagia   • Abrasions of multiple sites     Past Medical History:   Diagnosis Date   • CAD (coronary artery disease)     s/p CABG 1993 x single vessel   • Chronic atrial fibrillation    • Dyslipidemia    • ED (erectile dysfunction)    • HTN (hypertension)    • Long term current use of anticoagulant therapy    • Myocardial infarction (CMS/HCC)    • Shingles 09/2017   • TIA (transient ischemic attack) 2015     Past Surgical History:   Procedure Laterality Date   • CARDIAC CATHETERIZATION  02/08/1993    RCA with subtotal occlusion with residual thrombus formation at very proximal portion; Lad first diagonal branch with proximal 50% stenosis   • CARPAL TUNNEL RELEASE     • CORONARY ARTERY BYPASS GRAFT  02/16/1993    single coronary artery bypass using saphenous vein to the right coronary artery   • INGUINAL HERNIA REPAIR Right 04/2015    (has had total of 3 hernia repairs)   • ORTHOPEDIC SURGERY       General Information     Row Name 10/11/19 1209          PT Evaluation Time/Intention    Document Type  evaluation  -CM     Mode of Treatment  physical therapy;individual therapy 89 yo male adm 10/10/19 from [facility] rehab for increased weakness & dysphagia. Now being followed for dobb megha insertion, w/ peg tube placement scheduled for 10/14/19.   -CM     Row Name 10/11/19 1214 10/11/19 0178       General Information    Patient Profile Reviewed?  --  yes  -CM    Prior Level of Function  mod assist:;gait family reports that while at rehab pt was able to amb w/ rw x 500 ft  -CM  --    Existing Precautions/Restrictions  --  fall  -CM     Barriers to Rehab  --  cognitive status;previous functional deficit;hearing deficit  -    Row Name 10/11/19 1209          Relationship/Environment    Lives With  spouse  -     Row Name 10/11/19 1209          Resource/Environmental Concerns    Current Living Arrangements  other (see comments) from IP rehab; lives w/ wife at home normally  -     Row Name 10/11/19 1209          Cognitive Assessment/Intervention- PT/OT    Orientation Status (Cognition)  oriented to;place;person;situation  -     Cognitive Assessment/Intervention Comment  did not know month, season, or year  -     Row Name 10/11/19 1209          Safety Issues, Functional Mobility    Impairments Affecting Function (Mobility)  balance;cognition;coordination;endurance/activity tolerance;muscle tone abnormal;strength;postural/trunk control  -     Comment, Safety Issues/Impairments (Mobility)  moderate to severe hypertonicity in LUE, moderate hypertonicity in LLE  -       User Key  (r) = Recorded By, (t) = Taken By, (c) = Cosigned By    Initials Name Provider Type    CM Iris Kelley, PT Physical Therapist        Mobility     Row Name 10/11/19 1213          Bed Mobility Assessment/Treatment    Bed Mobility Assessment/Treatment  bed mobility (all) activities;supine-sit;sit-supine  -CM     [place] Level (Bed Mobility)  maximum assist (25% patient effort);2 person assist  -CM     Supine-Sit [place] (Bed Mobility)  moderate assist (50% patient effort);1 person assist uses RUE to assist & able to assist w/ bringing LEs to eob  -CM     Sit-Supine [place] (Bed Mobility)  moderate assist (50% patient effort);2 person assist  -CM     Assistive Device (Bed Mobility)  draw sheet;bed rails  -     Row Name 10/11/19 1213          Sit-Stand Transfer    Sit-Stand [place] (Transfers)  dependent (less than 25% patient effort) unable to come to standing fully from sitting at eob  -CM     Row Name 10/11/19 1215          Gait/Stairs  Assessment/Training    [place] Level (Gait)  not tested  -CM     Distance in Feet (Gait)  unable to safely ambulate  -CM       User Key  (r) = Recorded By, (t) = Taken By, (c) = Cosigned By    Initials Name Provider Type    Iris Pina, PT Physical Therapist        Obj/Interventions     Row Name 10/11/19 1216          General ROM    GENERAL ROM COMMENTS  wfl RUE/RLE; LUE contracted at wrist/hand; LLE wfl  -CM     Row Name 10/11/19 1216          MMT (Manual Muscle Testing)    General MMT Comments  LUE 3-/5 at shoulder, 2/5 at elbow, L wrist & hand non-functional 2* spasticity; LLE 2+/5; RUE 4-/5, RLE 4-/5  -CM     Row Name 10/11/19 1216          Static Sitting Balance    Level of [place] (Unsupported Sitting, Static Balance)  minimal assist, 75% patient effort  -CM     Sitting Position (Unsupported Sitting, Static Balance)  sitting on edge of bed  -CM     Row Name 10/11/19 1216          Dynamic Sitting Balance    Level of [place], Reaches Outside Midline (Sitting, Dynamic Balance)  moderate assist, 50 to 74% patient effort  -CM     Sitting Position, Reaches Outside Midline (Sitting, Dynamic Balance)  sitting on edge of bed  -CM     Row Name 10/11/19 1216          Static Standing Balance    Level of [place] (Supported Standing, Static Balance)  unable to perform activity  -CM     Row Name 10/11/19 1216          Dynamic Standing Balance    Level of [place], Reaches Outside Midline (Standing, Dynamic Balance)  unable to perform activity  -CM       User Key  (r) = Recorded By, (t) = Taken By, (c) = Cosigned By    Initials Name Provider Type    Iris Pina, PT Physical Therapist        Goals/Plan     Row Name 10/11/19 1226 10/11/19 1225       Bed Mobility Goal 1 (PT)    Activity/Assistive Device (Bed Mobility Goal 1, PT)  --  bed mobility activities, all  -CM    [place] Level/Cues Needed (Bed Mobility Goal 1, PT)  moderate assist (50-74% patient effort);2 person assist  -CM   --    Time Frame (Bed Mobility Goal 1, PT)  2 weeks  -CM  --    Row Name 10/11/19 1226          Transfer Goal 1 (PT)    Activity/Assistive Device (Transfer Goal 1, PT)  transfers, all;walker, rolling  -CM     [place] Level/Cues Needed (Transfer Goal 1, PT)  moderate assist (50-74% patient effort);2 person assist  -CM     Time Frame (Transfer Goal 1, PT)  2 weeks  -CM     Row Name 10/11/19 1226          Gait Training Goal 1 (PT)    Activity/Assistive Device (Gait Training Goal 1, PT)  gait (walking locomotion);assistive device use;walker, rolling  -CM     [place] Level (Gait Training Goal 1, PT)  moderate assist (50-74% patient effort);2 person assist  -CM     Distance (Gait Goal 1, PT)  25 ft  -CM     Time Frame (Gait Training Goal 1, PT)  2 weeks  -CM       User Key  (r) = Recorded By, (t) = Taken By, (c) = Cosigned By    Initials Name Provider Type    CM Iris Kelley, PT Physical Therapist        Clinical Impression     Row Name 10/11/19 1221          Pain Assessment    Additional Documentation  Pain Scale: Numbers Pre/Post-Treatment (Group)  -CM     Row Name 10/11/19 1221          Pain Scale: Numbers Pre/Post-Treatment    Pain Scale: Numbers, Pretreatment  0/10 - no pain  -CM     Pain Scale: Numbers, Post-Treatment  0/10 - no pain  -CM     Row Name 10/11/19 1221          Plan of Care Review    Plan of Care Reviewed With  patient;grandchild(nadia);spouse  -CM     Row Name 10/11/19 1221          Physical Therapy Clinical Impression    Patient/Family Goals Statement (PT Clinical Impression)  89 yo male adm from IP rehab for dysphagia, wt loss. 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 Pt to have peg tube placed on 10/14/19. Needs to continue skilled PT to work toward increased functional mobility s/p cva.  -CM     Criteria for Skilled Interventions Met (PT Clinical Impression)  yes;treatment indicated  -CM     Rehab Potential (PT Clinical Summary)  fair, will monitor progress closely  -CM     Predicted  Duration of Therapy (PT)  until d/c  -CM     Row Name 10/11/19 1221          Vital Signs    O2 Delivery Pre Treatment  room air  -CM     O2 Delivery Post Treatment  room air  -CM     Row Name 10/11/19 1221          Positioning and Restraints    Pre-Treatment Position  in bed  -CM     Post Treatment Position  bed  -CM     In Bed  notified nsg;side lying left;call light within reach;encouraged to call for assist;exit alarm on;with family/caregiver  -CM       User Key  (r) = Recorded By, (t) = Taken By, (c) = Cosigned By    Initials Name Provider Type    Iris Pina, PT Physical Therapist        Outcome Measures     Row Name 10/11/19 1229          How much help from another person do you currently need...    Turning from your back to your side while in flat bed without using bedrails?  1  -CM     Moving from lying on back to sitting on the side of a flat bed without bedrails?  1  -CM     Moving to and from a bed to a chair (including a wheelchair)?  1  -CM     Standing up from a chair using your arms (e.g., wheelchair, bedside chair)?  1  -CM     Climbing 3-5 steps with a railing?  1  -CM     To walk in hospital room?  1  -CM     AM-PAC 6 Clicks Score (PT)  6  -CM     Row Name 10/11/19 1229          Modified Ligia Scale    Pre-Stroke Modified Ligia Scale  4 - Moderately severe disability.  Unable to walk without assistance, and unable to attend to own bodily needs without assistance.  -CM     Modified Holt Scale  5 - Severe disability.  Bedridden, incontinent, and requiring constant nursing care and attention.  -CM     Row Name 10/11/19 1229          Functional Assessment    Outcome Measure Options  Modified Ligia;AM-PAC 6 Clicks Basic Mobility (PT)  -CM       User Key  (r) = Recorded By, (t) = Taken By, (c) = Cosigned By    Initials Name Provider Type    Iris Pina PT Physical Therapist        Physical Therapy Education     Title: PT OT SLP Therapies (In Progress)     Topic: Physical Therapy  (In Progress)     Point: Mobility training (Done)     Learning Progress Summary           Patient Acceptance, E,TB, VU by CM at 10/11/2019 12:27 PM   Family Acceptance, E,TB, VU by CM at 10/11/2019 12:27 PM                   Point: Body mechanics (Done)     Learning Progress Summary           Patient Acceptance, E,TB, VU by CM at 10/11/2019 12:27 PM   Family Acceptance, E,TB, VU by CM at 10/11/2019 12:27 PM                   Point: Precautions (Done)     Learning Progress Summary           Patient Acceptance, E,TB, VU by CM at 10/11/2019 12:27 PM   Family Acceptance, E,TB, VU by CM at 10/11/2019 12:27 PM                               User Key     Initials Effective Dates Name Provider Type Discipline     03/01/19 -  Iris Kelley, PT Physical Therapist PT              PT Recommendation and Plan  Planned Therapy Interventions (PT Eval): balance training, bed mobility training, gait training, motor coordination training, neuromuscular re-education, patient/family education, postural re-education, strengthening, stretching, transfer training  Outcome Summary/Treatment Plan (PT)  Anticipated Discharge Disposition (PT): inpatient rehabilitation facility  Plan of Care Reviewed With: patient, spouse  Outcome Summary: 89 yo male adm from IP rehab for dysphagia, wt loss. 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 Pt to have peg tube placed on 10/14/19. Needs to continue skilled PT to work toward increased functional mobility s/p cva.     Time Calculation:   PT Charges     Row Name 10/11/19 1230             Time Calculation    Start Time  1026  -CM      Stop Time  1059  -CM      Time Calculation (min)  33 min  -CM      PT Received On  10/11/19  -CM      PT - Next Appointment  10/12/19  -CM      PT Goal Re-Cert Due Date  10/25/19  -CM         Time Calculation- PT    Total Timed Code Minutes- PT  23 minute(s)  -CM        User Key  (r) = Recorded By, (t) = Taken By, (c) = Cosigned By    Initials Name Provider Type    CM  Iris Kelley, PT Physical Therapist        Therapy Charges for Today     Code Description Service Date Service Provider Modifiers Qty    80542216796  PT EVAL MOD COMPLEXITY 4 10/11/2019 Iris Kleley, PT GP 1    [id number]  PT THERAPEUTIC ACT EA 15 MIN 10/11/2019 Iris Kelley, PT GP 2          PT G-Codes  Outcome Measure Options: Modified Kane, AM-PAC 6 Clicks Basic Mobility (PT)  AM-PAC 6 Clicks Score (PT): 6  Modified Ligia Scale: 5 - Severe disability.  Bedridden, incontinent, and requiring constant nursing care and attention.    Iris Kelley, PURVI  10/11/2019         Electronically signed by Iris Kelley, PT at 10/11/19 1232       Occupational Therapy Notes (last 24 hours) (Notes from 10/10/19 1728 through 10/11/19 1728)     No notes of this type exist for this encounter.

## 2019-10-11 NOTE — PROGRESS NOTES
Pharmacist TPN Progress Note    Patient: Anuj Escobedo  MRN#: [id number]    Admission Date: 101019    Subjective/Objective  Severely malnourished 89 yom with peg-placement  surgery scheduled for Monday 10/14. Pt has dysphagia, unintentional weight loss,and recent CVA.      Due to high risk of refeeding syndrome will start patient on Clinimix 5/15 1 liter only (Amino acids 50 grams and 150 grams dextrose).     Additives:    Potassium acetate 40 meq  Multivitamin 10 mls  Trace 1ml    TPN labs on order for MELISSA Staples PharmD, BCPS

## 2019-10-11 NOTE — PROGRESS NOTES
Malnutrition Severity Assessment    Patient Name:  Anuj Escobedo  YOB: 1925  MRN: [id number]  Admit Date:  10/10/2019    Patient meets criteria for : Severe Malnutrition    Comments:  Intervention: Start TF Isosource HN @ 55 mL/hr with 25 mL/hr water flush + Beneprotein QD (based on 22 hr to allow time off for ADL). Goal TF:  Isosource HN @ 65 mL/hr with 25 mL/hr water flush + Beneprotein QD = 1741 kcal (99%); 83 gm PRO (99%), (550 ml flush + 1172 ml) 1722 ml total water + Rx flush + IVF     Malnutrition Severity Assessment  Malnutrition Type: Chronic Disease - Related Malnutrition     Malnutrition Type (last 8 hours)      Malnutrition Severity Assessment     Row Name 10/11/19 0849       Malnutrition Severity Assessment    Malnutrition Type  Chronic Disease - Related Malnutrition    Row Name 10/11/19 0849       Insufficient Energy Intake     Insufficient Energy Intake   <75% of est. energy requirement for > or equal to 1 month Per family pt has not been eating well for awhile and has had trouble eating since the stroke 9/14/19    Row Name 10/11/19 0849       Unintentional Weight Loss     Unintentional Weight Loss   Weight loss greater than 10% in six months Weight loss of 104% x 6 months- weight 4/19- 172 lbs and current body weight 154.5 (10/10/19)    Row Name 10/11/19 0849       Muscle Loss    Loss of Muscle Mass Findings  Severe    [place]  Severe - deep hollowing/scooping, lack of muscle to touch, facial bones well defined    Clavicle Bone Region  Severe - protruding prominent bone    Acromion Bone Region  Severe - squared shoulders, bones, and acromion process protrusion prominent    Scapular Bone Region  Severe - prominent bones, depressions easily visible between ribs, scapula, spine, shoulders    Dorsal Hand Region  None    Patellar Region  Moderate - patella more prominent, less muscle definition around patella    Anterior Thigh Region  Moderate - mild depression on inner thigh     Posterior Calf Region  Moderate - some roundness, slight firmness    Row Name 10/11/19 0849       Fat Loss    Subcutaneous Fat Loss Findings  Severe    Orbital Region   Severe - pronounced hollowness/depression, dark circles, loose saggy skin    Upper Arm Region  Severe - mostly skin, very little space between folds, fingers touch    Thoracic & Lumbar Region  -- did not obtain d/t positioning.     Row Name 10/11/19 0849       Criteria Met (Must meet criteria for severity in at least 2 of these categories: M Wasting, Fat Loss, Fluid, Secondary Signs, Wt. Status, Intake)    Patient meets criteria for   Severe Malnutrition          Electronically signed by:  Sabine Linton RD  10/11/19 9:10 AM

## 2019-10-11 NOTE — CONSULTS
picc team consult:    picc line placed RUE basilic vessel utilizing US guidance and modified seldinger technique with easily compressible vessel without difficulty.

## 2019-10-11 NOTE — THERAPY EVALUATION
Acute Care - Speech Language Pathology   Swallow Initial Evaluation  Zhao     Patient Name: Anuj Escobedo  : 1925  MRN: [id number]  Today's Date: 10/11/2019               Admit Date: 10/10/2019    Visit Dx:     ICD-10-CM ICD-9-CM   1. Dysphagia, unspecified type R13.10 787.20   2. Oropharyngeal dysphagia R13.12 787.22     Patient Active Problem List   Diagnosis   • Dysphagia   • Atrial fibrillation, chronic   • Dyslipidemia   • Degenerative arthritis   • Coronary artery disease   • Benign prostatic hyperplasia   • Hypertension   • Long term current use of anticoagulant therapy   • Mixed hyperlipidemia   • Peripheral edema   • Primary malignant neoplasm of head (CMS/HCC)   • Shingles   • Oropharyngeal dysphagia   • Abrasions of multiple sites     Past Medical History:   Diagnosis Date   • CAD (coronary artery disease)     s/p CABG  x single vessel   • Chronic atrial fibrillation    • Dyslipidemia    • ED (erectile dysfunction)    • HTN (hypertension)    • Long term current use of anticoagulant therapy    • Myocardial infarction (CMS/HCC)    • Shingles 2017   • TIA (transient ischemic attack)      Past Surgical History:   Procedure Laterality Date   • CARDIAC CATHETERIZATION  1993    RCA with subtotal occlusion with residual thrombus formation at very proximal portion; Lad first diagonal branch with proximal 50% stenosis   • CARPAL TUNNEL RELEASE     • CORONARY ARTERY BYPASS GRAFT  1993    single coronary artery bypass using saphenous vein to the right coronary artery   • INGUINAL HERNIA REPAIR Right 2015    (has had total of 3 hernia repairs)   • ORTHOPEDIC SURGERY          SWALLOW EVALUATION (last 72 hours)      SLP Adult Swallow Evaluation     Row Name 10/11/19 1000                   Rehab Evaluation    Document Type  evaluation  -VR        Subjective Information  no complaints  -VR        Patient Observations  alert;cooperative  -VR        Patient/Family Observations  Family  reports pt is more alert today.  -VR        Patient Effort  good  -VR           General Information    Patient Profile Reviewed  yes  -VR        Pertinent History Of Current Problem  Pt was adm. to [facility] ED on 10/10 /19 with c/o dysphagia and progressive weakness.  Pt was adm. to [facility] on 9/14/19 w/ CVA then was transferred to [facility] and was there for approx. 3 weeks. Pt reportedly became weaker due to not being able to swallow. Pt reportedly lost 9 lbs in 10 days.  Pt reportedly participated in a VFSS at [facility] which recommended puree and HTL. Pt cont. on puree and HTL at [facility] and recently intake has decreased w/pt holding food in oral cavity for extended periods before swallowing. Pt has not participated in a FEES or VFSS recently while at [facility]. Pt is scheduled for NG tube placement today and G-tube placement on Monday Dec 14.  -VR        Prior Level of Function-Swallowing  no diet consistency restrictions;regular textures;thin liquids  -VR        Patient's Goals for Discharge  return to PO diet  -VR           Oral Motor and Function    Dentition Assessment  other (see comments) Natural w/edentulous gaps. Partial denture unavail. here.  -VR        Secretion Management  dried secretions in oral cavity  -VR        Mucosal Quality  cracked;sticky;dry yellowish coating on tongue (thrush?)  -VR        Volitional Cough  weak  -VR           Oral Musculature and Cranial Nerve Assessment    Oral Motor General Assessment  lingual impairment;other (see comments) Pt w/shortened frenulum.  -VR        Oral Labial or Buccal Impairment, Detail, Cranial Nerve VII (Facial):  reduced ROM  -VR        Lingual Impairment, Detail. Cranial Nerves IX, XII (Glossopharyngeal and Hypoglossal)  reduced lingual ROM;reduced strength;bilaterally Tongue deviated to R on protrusion.  -VR           General Eating/Swallowing Observations    Eating/Swallowing Skills  fed by SLP  -VR        Positioning During Eating  upright in bed  -VR         Consistencies Trialed  pureed;thin liquids;honey-thick liquids ice chips  -VR           Clinical Swallow Eval    Oral Prep Phase  impaired  -VR        Pharyngeal Phase  suspected pharyngeal impairment  -VR        Clinical Swallow Evaluation Summary  Pt exhibited decreased oral prep and A-P transit w/all consistencies. Pt held food and liqs in oral cavity briefly before swallowing. Swallow response was inconsistently mildly delayed. Pt exhibited R anterior loss of liqs. from oral cavity and a cough after thins via cup. Pt exhibited belching and spont. mult. swallows after HTL via cup.  Recommmend NPO until VFSS can be performed.  Pt to have NG tube placed today, therefore Nsg  reported that pt needs to remain NPO for NG placement. Will check on pt tomorrow and complete VFSS if appropriate.  -VR           Oral Prep Concerns    Oral Prep Concerns  oral holding;incomplete or weak lip closure around spoon;anterior loss  -VR        Oral Holding  thin;honey;pudding  -VR        Incomplete or Weak Lip Closure Around Spoon  thin;other (see comments) via cup  -VR        Anterior Loss  thin;other (see comments) via cup  -VR           Pharyngeal Phase Concerns    Pharyngeal Phase Concerns  multiple swallows;cough  -VR        Multiple Swallows  pudding;honey  -VR        Cough  thin;other (see comments) via cup  -VR           Recommendations    Therapy Frequency (Swallow)  PRN  -VR        SLP Diet Recommendation  NPO  -VR        Recommended Diagnostics  VFSS ([facility])  -VR        SLP Rec. for Method of Medication Administration  meds via alternate route  -VR        Anticipated Dischage Disposition  skilled nursing facility  -VR           Swallow Goals (SLP)    Oral Nutrition/Hydration Goal Selection (SLP)  oral nutrition/hydration, SLP goal 1;oral nutrition/hydration, SLP goal 2  -VR        Additional Documentation  lingual strengthening goal selection (SLP);pharyngeal strengthening exercise goal selection (SLP)  -VR            Oral Nutrition/Hydration Goal 1 (SLP)    Oral Nutrition/Hydration Goal 1, SLP  Pt will participate in a VFSS to further assess pharyngeal stage of swallow.  -VR        Time Frame (Oral Nutrition/Hydration Goal 1, SLP)  3 days  -VR           Oral Nutrition/Hydration Goal 2 (SLP)    Oral Nutrition/Hydration Goal 2, SLP  Pt will safely tolerate least restrictive diet consistencies w/min cues.  -VR        Time Frame (Oral Nutrition/Hydration Goal 2, SLP)  by discharge  -VR          User Key  (r) = Recorded By, (t) = Taken By, (c) = Cosigned By    Initials Name Effective Dates    Pallavi Sanon SLP 06/17/19 -           EDUCATION  The patient has been educated in the following areas:   Dysphagia (Swallowing Impairment).    SLP Recommendation and Plan     SLP Diet Recommendation: NPO     SLP Rec. for Method of Medication Administration: meds via alternate route        Recommended Diagnostics: VFSS ([facility])     Anticipated Dischage Disposition: skilled nursing facility     Therapy Frequency (Swallow): PRN               SLP GOALS     Row Name 10/11/19 1000             Oral Nutrition/Hydration Goal 1 (SLP)    Oral Nutrition/Hydration Goal 1, SLP  Pt will participate in a VFSS to further assess pharyngeal stage of swallow.  -VR      Time Frame (Oral Nutrition/Hydration Goal 1, SLP)  3 days  -VR         Oral Nutrition/Hydration Goal 2 (SLP)    Oral Nutrition/Hydration Goal 2, SLP  Pt will safely tolerate least restrictive diet consistencies w/min cues.  -VR      Time Frame (Oral Nutrition/Hydration Goal 2, SLP)  by discharge  -VR        User Key  (r) = Recorded By, (t) = Taken By, (c) = Cosigned By    Initials Name Provider Type    Pallavi Sanon SLP Speech and Language Pathologist             Time Calculation:       Therapy Charges for Today     Code Description Service Date Service Provider Modifiers Qty    [id number]  ST EVAL ORAL PHARYNG SWALLOW 6 10/11/2019 Pallavi Gonzalez, [place] GN 1               Pallavi  Carlos SLP  10/11/2019

## 2019-10-11 NOTE — PLAN OF CARE
Problem: Patient Care Overview  Goal: Plan of Care Review  Outcome: Ongoing (interventions implemented as appropriate)   10/11/19 0402   Coping/Psychosocial   Plan of Care Reviewed With patient   Plan of Care Review   Progress no change   OTHER   Outcome Summary will moniter pt progress , pt turned and reposition        Problem: Skin Injury Risk (Adult)  Goal: Identify Related Risk Factors and Signs and Symptoms  Outcome: Ongoing (interventions implemented as appropriate)   10/11/19 0402   Skin Injury Risk (Adult)   Related Risk Factors (Skin Injury Risk) advanced age;hospitalization prolonged;nutritional deficiencies     Goal: Skin Health and Integrity  Outcome: Ongoing (interventions implemented as appropriate)   10/11/19 0402   Skin Injury Risk (Adult)   Skin Health and Integrity making progress toward outcome

## 2019-10-11 NOTE — PROGRESS NOTES
LOS: 1 day   Patient Care Team:  Devon Arriaza Jr., MD as PCP - General      Subjective    None     Interval History:   Nursing staff unable to place DHT, IR consulted to place. If they are unable then we will consider placing it endoscopically.     ROS:   Left sided weakness-chronic  No chest pain, shortness of breath, or cough.       Medication Review:     Current Facility-Administered Medications:   •  aluminum-magnesium hydroxide-simethicone (MAALOX MAX) 400-400-40 MG/5ML suspension 15 mL, 15 mL, Oral, Q6H PRN, Marisela Kaufman, APRN  •  atorvastatin (LIPITOR) tablet 5 mg, 5 mg, Oral, Nightly, Marisela Kaufman, APRN  •  baclofen (LIORESAL) tablet 10 mg, 10 mg, Oral, Nightly PRN, Marisela Kaufman, APRN  •  [START ON 10/12/2019] BENEPROTEIN packet 1 packet, 1 packet, Nasogastric, Daily, Sabine Linton, LEO  •  bisacodyl (DULCOLAX) suppository 10 mg, 10 mg, Rectal, Daily PRN, Marisela Kaufman, APRN  •  cholecalciferol (VITAMIN D3) tablet 1,000 Units, 1,000 Units, Oral, Daily, Marisela Kaufman, APRN  •  enoxaparin (LOVENOX) syringe 70 mg, 70 mg, Subcutaneous, Q12H, Lashay Snyder, APRN, 70 mg at 10/11/19 0729  •  fluconazole (DIFLUCAN) tablet 100 mg, 100 mg, Oral, Daily, Marisela Kaufman, APRN  •  hydrALAZINE (APRESOLINE) injection 10 mg, 10 mg, Intravenous, Q6H PRN, Marisela Kaufman, APRN  •  hydrocortisone-diphenhydramine-nystatin (MAGIC MOUTHWASH) suspension 15 mL, 15 mL, Swish & Spit, 4x Daily, Marisela Kaufman, APRN  •  influenza vac split quad (FLUZONE,FLUARIX,AFLURIA) injection 0.5 mL, 0.5 mL, Intramuscular, During Hospitalization, Sarah Guajardo MD  •  magnesium hydroxide (MILK OF MAGNESIA) suspension 2400 mg/10mL 10 mL, 10 mL, Oral, Daily PRN, Marisela Kaufman, APRN  •  Magnesium Sulfate 2 gram infusion - Mg less than or equal to 1.5 mg/dL, 2 g, Intravenous, PRN **OR** Magnesium Sulfate 1 gram infusion - Mg 1.6-1.9 mg/dL, 1 g, Intravenous, PRN, Marisela Kaufman, APRN  •   melatonin tablet 5 mg, 5 mg, Oral, Nightly PRN, Marisela Kaufman, APRN  •  ondansetron (ZOFRAN) tablet 4 mg, 4 mg, Oral, Q6H PRN **OR** ondansetron (ZOFRAN) injection 4 mg, 4 mg, Intravenous, Q6H PRN, Cheyenne Kaufmana RADAMES, APRN  •  pantoprazole (PROTONIX) injection 40 mg, 40 mg, Intravenous, Q AM, Marisela Kaufman, APRN, 40 mg at 10/11/19 0728  •  Pharmacy Consult - Pharmacy to dose, , Does not apply, Continuous PRN, Lashay Snyder APRN  •  Pharmacy to Dose enoxaparin (LOVENOX), , Does not apply, Continuous PRN, Marisela Kaufman, APRN  •  potassium chloride (K-DUR,KLOR-CON) CR tablet 40 mEq, 40 mEq, Oral, PRN **OR** potassium chloride (KLOR-CON) packet 40 mEq, 40 mEq, Oral, PRN **OR** potassium chloride 10 mEq in 100 mL IVPB, 10 mEq, Intravenous, Q1H PRN, Cheyenne Kaufmana RADAMES, APRN  •  sertraline (ZOLOFT) tablet 25 mg, 25 mg, Oral, Nightly, Marisela Kaufman, APRN  •  sodium chloride 0.9 % flush 10 mL, 10 mL, Intravenous, PRN, Kamran Maldonado MD  •  sodium chloride 0.9 % flush 10 mL, 10 mL, Intravenous, Q12H, Marisela Kaufman, APRN, 10 mL at 10/11/19 0729  •  sodium chloride 0.9 % flush 10 mL, 10 mL, Intravenous, PRN, Orquidea Kaufmansea RADAMES, APRN  •  sodium chloride 0.9 % infusion, 75 mL/hr, Intravenous, Continuous, Marisela Kaufman, APRN, Last Rate: 75 mL/hr at 10/11/19 0733, 75 mL/hr at 10/11/19 0733  •  tamsulosin (FLOMAX) 24 hr capsule 0.4 mg, 0.4 mg, Oral, Nightly, Marisela Kaufman, APRN      Objective    Resting in hospital bed. Family at bedside    Vital Signs  Temp:  [97.3 °F (36.3 °C)-97.9 °F (36.6 °C)] 97.5 °F (36.4 °C)  Heart Rate:  [67-80] 76  Resp:  [13-16] 14  BP: (106-149)/(57-89) 133/57  Physical Exam:    General Appearance:    in no acute distress   Head:    Normocephalic, without obvious abnormality   Eyes:          Conjunctivae normal, anicteric sclera   Ears:    Hearing intact   Throat:   No oral lesions, no thrush, oral mucosa moist   Neck:   No adenopathy, supple, no JVD    Lungs:     Clear to auscultation bilaterally, respirations regular, even and unlabored    Heart:    Regular rhythm and normal rate, normal S1 and S2, no            murmur, no gallop, no rub   Abdomen:     Normal bowel sounds, soft, non-tender, no rebound or guarding, non-distended, no hepatosplenomegaly   Rectal:     Deferred   Extremities:   No edema, no cyanosis, no redness   Skin:   No bleeding, bruising or rash, no jaundice   Neurologic:   Cranial nerves 2 - 12 grossly intact, no asterixis, sensation   intact        Results Review:    Lab Results (last 24 hours)     Procedure Component Value Units Date/Time    Magnesium [[id number]]  (Normal) Collected:  10/11/19 0732    Specimen:  Blood Updated:  10/11/19 0824     Magnesium 2.4 mg/dL     Basic Metabolic Panel [[id number]]  (Abnormal) Collected:  10/11/19 0732    Specimen:  Blood Updated:  10/11/19 0824     Glucose 79 mg/dL      BUN 32 mg/dL      Creatinine 1.20 mg/dL      Sodium 151 mmol/L      Potassium 3.9 mmol/L      Chloride 114 mmol/L      CO2 27.0 mmol/L      Calcium 9.5 mg/dL      eGFR Non African Amer 56 mL/min/1.73      BUN/Creatinine Ratio 26.7     Anion Gap 13.9 mmol/L     Narrative:       The MDRD GFR formula is only valid for adults with stable renal function between ages 18 and 70.    CBC Auto Differential [[id number]]  (Abnormal) Collected:  10/11/19 0732    Specimen:  Blood Updated:  10/11/19 0754     WBC 7.50 10*3/mm3      RBC 4.92 10*6/mm3      Hemoglobin 15.3 g/dL      Hematocrit 46.2 %      MCV 93.9 fL      MCH 31.0 pg      MCHC 33.1 g/dL      RDW 15.7 %      RDW-SD 52.5 fl      MPV 8.3 fL      Platelets 157 10*3/mm3      Neutrophil % 76.6 %      Lymphocyte % 13.7 %      Monocyte % 9.1 %      Eosinophil % 0.4 %      Basophil % 0.2 %      Neutrophils, Absolute 5.80 10*3/mm3      Lymphocytes, Absolute 1.00 10*3/mm3      Monocytes, Absolute 0.70 10*3/mm3      Eosinophils, Absolute 0.00 10*3/mm3      Basophils, Absolute 0.00 10*3/mm3      nRBC  0.0 /100 WBC     Comprehensive Metabolic Panel [[id number]]  (Abnormal) Collected:  10/10/19 1440    Specimen:  Blood Updated:  10/10/19 1531     Glucose 109 mg/dL      BUN 29 mg/dL      Creatinine 1.30 mg/dL      Sodium 148 mmol/L      Potassium 4.6 mmol/L      Chloride 110 mmol/L      CO2 30.0 mmol/L      Calcium 10.3 mg/dL      Total Protein 7.2 g/dL      Albumin 3.10 g/dL      ALT (SGPT) 25 U/L      AST (SGOT) 31 U/L      Alkaline Phosphatase 81 U/L      Total Bilirubin 2.6 mg/dL      eGFR Non African Amer 51 mL/min/1.73      Globulin 4.1 gm/dL      A/G Ratio 0.8 g/dL      BUN/Creatinine Ratio 22.3     Anion Gap 12.6 mmol/L     Narrative:       The MDRD GFR formula is only valid for adults with stable renal function between ages 18 and 70.    Protime-INR [[id number]]  (Abnormal) Collected:  10/10/19 1505    Specimen:  Blood Updated:  10/10/19 1531     Protime 14.3 Seconds      INR 1.45    CBC & Differential [[id number]] Collected:  10/10/19 1440    Specimen:  Blood Updated:  10/10/19 1447    Narrative:       The following orders were created for panel order CBC & Differential.  Procedure                               Abnormality         Status                     ---------                               -----------         ------                     CBC Auto Differential[[id number]]        Abnormal            Final result                 Please view results for these tests on the individual orders.    CBC Auto Differential [[id number]]  (Abnormal) Collected:  10/10/19 1440    Specimen:  Blood Updated:  10/10/19 1447     WBC 10.20 10*3/mm3      RBC 5.39 10*6/mm3      Hemoglobin 16.7 g/dL      Hematocrit 49.5 %      MCV 91.9 fL      MCH 30.9 pg      MCHC 33.6 g/dL      RDW 15.5 %      RDW-SD 49.9 fl      MPV 7.8 fL      Platelets 193 10*3/mm3      Neutrophil % 82.6 %      Lymphocyte % 9.3 %      Monocyte % 7.6 %      Eosinophil % 0.2 %      Basophil % 0.3 %      Neutrophils, Absolute 8.40 10*3/mm3      Lymphocytes,  Absolute 0.90 10*3/mm3      Monocytes, Absolute 0.80 10*3/mm3      Eosinophils, Absolute 0.00 10*3/mm3      Basophils, Absolute 0.00 10*3/mm3      nRBC 0.0 /100 WBC           Imaging Results (last 24 hours)     Procedure Component Value Units Date/Time    XR Chest PA & Lateral [[id number]] Collected:  10/10/19 2205     Updated:  10/10/19 2211    Narrative:       Examination: XR CHEST PA AND LATERAL-     Date of Exam: 10/10/2019 7:45 PM     Indication: rule out aspiration pneumonia; R13.10-Dysphagia,  unspecified; R13.12-Dysphagia, oropharyngeal phase.     Comparison: 03/22/2019     Technique: 2 radiographic views of the chest were obtained.     Findings:  There is mild cardiomegaly. There has been a median sternotomy and  coronary artery bypass. There is rotation and tortuosity of the aorta.  Dilatation of the aorta is not excluded. There is mild airspace opacity  in the left lower lung. Right lung is clear. No significant bone  abnormality is noted.       Impression:       1. Mild left basal airspace opacity could be due to pneumonia or  aspiration.  2. Cardiomegaly.  3. Aortic calcification and tortuosity. Aortic dilatation is not  excluded.     Electronically Signed By-Nya Baez On:10/10/2019 10:09 PM  This report was finalized on [id number] by  Nya Baez, .    CT Head Without Contrast [[id number]] Collected:  10/10/19 1634     Updated:  10/10/19 1640    Narrative:          DATE OF EXAM:  10/10/2019 3:36 PM     PROCEDURE:   CT HEAD WO CONTRAST-     INDICATIONS:   Focal neuro deficit, difficulty swallowing.     COMPARISON:  No Comparisons Available     TECHNIQUE:   Routine transaxial cuts were obtained through the head without the  administration of contrast. Automated exposure control and iterative  reconstruction methods were used.      FINDINGS:  There is enlargement of the cerebral sulci, fissures, ventricles, and  basal cisterns consistent with advanced cerebral atrophy. There are  areas of decreased  density in the white matter tracks which is  nonspecific, but usually indicates chronic microvascular ischemia. There  are old lacunar infarcts seen in the basal ganglia bilaterally. There is  no acute hemorrhage, midline shift, or suspicious extra-axial fluid  collections. The orbital contents are normal. The paranasal and mastoid  sinuses are clear.        Impression:          1. Advanced cerebral atrophy.  2. Chronic ischemic changes.  3. No acute findings.     Electronically Signed By-Juancho Parham On:10/10/2019 4:38 PM  This report was finalized on [id number] by  Juancho Parham, .            Assessment/Plan   Evaluation for PEG placement  Dysphasia  Unintentional weight loss  Recent CVA-on Eliquis  Atrial fibrillation  Coronary artery disease status post CABG  Hypertension  Weight loss   Hx of cholecystectomy     Plan:  Plan DHT placement today by IR or possibly endoscopic vs TPN  Eliquis is on hold & on lovenox.   Speech is in room working with patient, he is on nectar thickened liquids.   Tentatively planned for PEG 10/13/19      Annette Phan, ESPERANZA  10/11/19  9:37 AM

## 2019-10-11 NOTE — PROGRESS NOTES
Continued Stay Note  [facility]     Patient Name: Anuj Escobedo  MRN: [id number]  Today's Date: 10/11/2019    Admit Date: 10/10/2019    Discharge Plan     Row Name 10/11/19 1727       Plan    Plan  Return to [facility] at d/c pending pre-cert and bed availability. From subacute unit, but possible acute at d/c pending clinical course. No PASRR needed (approved if plan changes).      Jo Ann Anderson, CAROW, LSW  PRN   Phone: [phone]

## 2019-10-11 NOTE — CONSULTS
"Adult Nutrition  Assessment/PES    Patient Name:  Anuj Escobedo  YOB: 1925  MRN: [id number]  Admit Date:  10/10/2019    Assessment Date:  10/11/2019    Comments:    TPN: Start Clinimix 5/15 @ 1 L 50 gm PRO (200 kcal/55%), 150 gm Dex (510 kcal)= 710 kcal (40%)    End goal: Clinimix 5/15 @ 2160 mL volume w/ 250 mL 20% lipids three time per week. Provides 1748 kcal (99%), 108 gm PRO (118%)= 1534 kcal + 214 ml provided by fat= 1748 kcal (99%)     Start TF Isosource HN @ 55 mL/hr with 25 mL/hr water flush + Beneprotein QD (based on 22 hr to allow time off for ADL). Goal TF:  Isosource HN @ 65 mL/hr with 25 mL/hr water flush + Beneprotein QD = 1741 kcal (99%); 83 gm PRO (99%), (550 ml flush + 1172 ml) 1722 ml total water + Rx flush + IVF     Reason for Assessment     Row Name 10/11/19 0836          Reason for Assessment    Reason For Assessment  TF/PN     Diagnosis PMH: CVA, hyperlipidemia, CAD status post CABG, thrush, vitamin D deficiency, chronic atrial fibrillation, hypertension, and MI     Identified At Risk by Screening Criteria Current Dx: Dysphagia, ?Acute Kidney Injury, Chronic Hypotension, CAD s/p CABG, Chronic AFib, Hyperlipidemia, Oral Thrush, PEG-to be placed on Monday (10/14), HTN         Nutrition/Diet History     Row Name 10/11/19 0811          Nutrition/Diet History    Typical Food/Fluid Intake  D/W family intake and stated had not been able to eat anything in the last 9-10 days but had not really been eaten well prior. Stroke 4 weeks ago. Confirmed weight loss.      Food Allergies  -- No known food allergies.          Anthropometrics     Row Name 10/11/19 9325       Anthropometrics    Height  177.8 cm (70\")    Weight  70.1 kg (154 lb 8.7 oz) 10/10/19       Admit Weight    Admit Weight  70.8 kg (156 lb) 10/10/19       Ideal Body Weight (IBW)    Ideal Body Weight (IBW) (kg)  76.48    % Ideal Body Weight  91.66       Usual Body Weight (UBW)    Usual Body Weight  81.6 kg (180 lb) per family    " "% Usual Body Weight  85.86    Weight Loss  unintentional 10.4% x 6 months    Weight Loss Time Frame  Wt hx:   173.5 lb (6/19),   172 lb (4/19),   180 lb (3/19),   179.8 (1/19),   182.4 (12/18),   181 lb (10/18),   180 lb (9/18)       Body Mass Index (BMI)    BMI (kg/m2)  22.22        Labs/Tests/Procedures/Meds     Row Name 10/11/19 0843          Labs/Procedures/Meds    Lab Results Comments  Glucose 109 (H), Na 148 (H), Crt 1.3 (H), BUN 29 (H), K 3.9 WNL, Mg 2.4 WNL        Medications    Pertinent Medications Comments  Lipitor, Diflucan, Protonix         Physical Findings     Row Name 10/11/19 0844          Physical Findings    Overall Physical Appearance  -- Appears Frail, NFPE completed 10/11/19; See MSA     Gastrointestinal  -- No BM x 1 day     Tubes  -- No NG tube placed. Nrg attempted 3x.      Oral/Mouth Cavity  -- Dysphagia      Skin  -- Left leg abrasion, right gluteal ST, Right gluteal Abrasion          Estimated/Assessed Needs     Row Name 10/11/19 0847 10/11/19 0840       Calculation Measurements    Weight Used For Calculations  70.1 kg (154 lb 8.7 oz)  --    Height  177.8 cm (70\")  177.8 cm (70\")       Estimated/Assessed Needs    Additional Documentation  Protein Requirements (Group);Fluid Requirements (Group);Calorie Requirements (Group);KCAL/KG (Group)  --       Calorie Requirements    Weight Used For Calorie Calculations  70.1 kg (154 lb 8.7 oz)  --    Estimated Calorie Need Method  [facility]  --    Estimated Calorie Requirement Comment  1761 kcal (using AF 1.3)   --       Protein Requirements    Weight Used For Protein Calculations  70.1 kg (154 lb 8.7 oz)  --    Est Protein Requirement Amount (gms/kg)  1.3 gm protein  --    Estimated Protein Requirements (gms/day) 118%   --       Fluid Requirements    Estimated Fluid Requirements (mL/day)  1761  --    Estimated Fluid Requirement Method  RDA Method  --    RDA Method (mL)  1761  --              Nutrition Prescription Ordered     Row Name 10/11/19 " 0848          Nutrition Prescription PO    Current PO Diet  NPO        Nutrition Prescription EN    Enteral Route  NG PEG monday?      Product  Maciel MITCHELL (Osmolite 1.2)     Modulars  Protein powder (6 gm/pkt)     TF Delivery Method  Continuous     Continuous TF Goal Rate (mL/hr)  65 mL/hr     Water flush (mL)   25 mL     Water Flush Frequency  Per hour         Evaluation of Received Nutrient/Fluid Intake     Row Name 10/11/19 0849          PO Evaluation    Number of Days PO Intake Evaluated  -- -     Number of Meals  -- -     % PO Intake  -- -        EN Evaluation    Number of Days EN Intake Evaluated  -- -     TF Changes  -- -     TF Residual  -- -     TF Tolerance  -- -           Malnutrition Severity Assessment     Row Name 10/11/19 0849          Malnutrition Severity Assessment    Malnutrition Type  Chronic Disease - Related Malnutrition        Insufficient Energy Intake     Insufficient Energy Intake   <75% of est. energy requirement for > or equal to 1 month Per family pt has not been eating well for awhile and has had trouble eating since the stroke 9/14/19        Unintentional Weight Loss     Unintentional Weight Loss   Weight loss greater than 10% in six months Weight loss of 104% x 6 months- weight 4/19- 172 lbs and current body weight 154.5 (10/10/19)        Muscle Loss    Loss of Muscle Mass Findings  Severe     Latter day Region  Severe - deep hollowing/scooping, lack of muscle to touch, facial bones well defined     Clavicle Bone Region  Severe - protruding prominent bone     Acromion Bone Region  Severe - squared shoulders, bones, and acromion process protrusion prominent     Scapular Bone Region  Severe - prominent bones, depressions easily visible between ribs, scapula, spine, shoulders     Dorsal Hand Region  None     Patellar Region  Moderate - patella more prominent, less muscle definition around patella     Anterior Thigh Region  Moderate - mild depression on inner thigh     Posterior Calf Region   Moderate - some roundness, slight firmness        Fat Loss    Subcutaneous Fat Loss Findings  Severe     Orbital Region   Severe - pronounced hollowness/depression, dark circles, loose saggy skin     Upper Arm Region  Severe - mostly skin, very little space between folds, fingers touch     Thoracic & Lumbar Region  -- did not obtain d/t positioning.         Criteria Met (Must meet criteria for severity in at least 2 of these categories: M Wasting, Fat Loss, Fluid, Secondary Signs, Wt. Status, Intake)    Patient meets criteria for   Severe Malnutrition           Problem/Interventions:  Problem 1     Row Name 10/11/19 0855          Nutrition Diagnoses Problem 1    Problem 1  -- Severe chronic malnutrition      Etiology (related to)  -- physiological causes (stroke, MI)     Signs/Symptoms (evidenced by)  -- severe muscle wasting, severe fat loss, < 75% of est energy requirement for >/= 1 month, and 10.4% weight loss x 6 months.                  Intervention Goal     Row Name 10/11/19 0856          Intervention Goal    General PN tolerance         Nutrition Intervention     Row Name 10/11/19 0856          Nutrition Intervention    RD/Tech Action  Recommend/ordered     Recommended/Ordered  PN/EN         Nutrition Prescription     Row Name 10/11/19 0857          Nutrition Prescription EN    Enteral Prescription  Enteral begin/change     Enteral Route  NG PEG?     Product  Isosource HN     Modulars  Protein powder (6 gm/pkt) Start TF w/ Isosource HN @ 55 mL/hr with 25 ml/hr + Beneprotein QD d/t increased risk of RFS.     Protein Powder (6gm/pkt)  1 packet     TF Delivery Method  Continuous     Continuous TF Goal Rate (mL/hr)  65 mL/hr     Water flush (mL)   25 mL     Water Flush Frequency  Per hour    Goal TF:  Isosource HN @ 65 mL/hr with 25 mL/hr water flush + Beneprotein QD = 1741 kcal (99%); 83 gm PRO (99%), (550 ml flush + 1172 ml) 1722 ml total water + Rx flush + IVF    TPN: Start Clinimix 5/15 @ 1 L 50 gm PRO (200  kcal/55%), 150 gm Dex (510 kcal)= 710 kcal (40%)    End goal: Clinimix 5/15 @ 2160 mL volume w/ 250 mL 20% lipids three time per week. Provides 1748 kcal (99%), 108 gm PRO (118%)= 1534 kcal + 214 ml provided by fat= 1748 kcal (99%)      Education/Evaluation     Row Name 10/11/19 0857          Monitor/Evaluation    Monitor  TF delivery/tolerance;Pertinent labs;Weight;Skin status BM            Electronically signed by:  Sabine Linton RD  10/11/19 8:58 AM

## 2019-10-11 NOTE — PLAN OF CARE
Problem: Patient Care Overview  Goal: Plan of Care Review  Outcome: Ongoing (interventions implemented as appropriate)   10/11/19 6634   Coping/Psychosocial   Plan of Care Reviewed With patient;spouse;daughter;son   OTHER   Outcome Summary A clinical bedside swallow evval. was completed this date. Rec NPO until VFSS can be completed.

## 2019-10-11 NOTE — PROGRESS NOTES
Wound Initial Evaluation  [facility]     Patient Name: Anuj Escobedo  : 1925  MRN: [id number]  Today's Date: 10/11/2019 Room Number: 310/1      Admit Date: 10/10/2019  Attending: Sarah Guajardo MD    Consult Requested By: Dr. Guajardo    Reason For Consult: Multiple abrasions to buttock and thigh    Chief Complaint: Patient opens eyes but he is not able to respond to provide a history.  His son is at the bedside and reports that there are no known falls or trauma he believes that the areas of been present for several weeks and started when he was in the hospital.  He is currently on an SAT surface    Visit Dx:    ICD-10-CM ICD-9-CM   1. Dysphagia, unspecified type R13.10 787.20   2. Oropharyngeal dysphagia R13.12 787.22     Patient Active Problem List   Diagnosis   • Dysphagia   • Atrial fibrillation, chronic   • Dyslipidemia   • Degenerative arthritis   • Coronary artery disease   • Benign prostatic hyperplasia   • Hypertension   • Long term current use of anticoagulant therapy   • Mixed hyperlipidemia   • Peripheral edema   • Primary malignant neoplasm of head (CMS/HCC)   • Shingles   • Oropharyngeal dysphagia   • Abrasions of multiple sites       History:   Past Medical History:   Diagnosis Date   • CAD (coronary artery disease)     s/p CABG  x single vessel   • Chronic atrial fibrillation    • Dyslipidemia    • ED (erectile dysfunction)    • HTN (hypertension)    • Long term current use of anticoagulant therapy    • Myocardial infarction (CMS/HCC)    • Shingles 2017   • TIA (transient ischemic attack)      Past Surgical History:   Procedure Laterality Date   • CARDIAC CATHETERIZATION  1993    RCA with subtotal occlusion with residual thrombus formation at very proximal portion; Lad first diagonal branch with proximal 50% stenosis   • CARPAL TUNNEL RELEASE     • CORONARY ARTERY BYPASS GRAFT  1993    single coronary artery bypass using saphenous vein to the right coronary artery   •  INGUINAL HERNIA REPAIR Right 04/2015    (has had total of 3 hernia repairs)   • ORTHOPEDIC SURGERY       Social History     Socioeconomic History   • Marital status:      Spouse name: Not on file   • Number of children: Not on file   • Years of education: Not on file   • Highest education level: Not on file   Tobacco Use   • Smoking status: Former Smoker     Packs/day: 1.00     Types: Cigarettes   Substance and Sexual Activity   • Alcohol use: No       Allergies:  Allergies   Allergen Reactions   • Celecoxib Unknown (See Comments)   • Ibuprofen Unknown (See Comments)       Medications:    Current Facility-Administered Medications:   •  aluminum-magnesium hydroxide-simethicone (MAALOX MAX) 400-400-40 MG/5ML suspension 15 mL, 15 mL, Oral, Q6H PRN, Marisela Kaufman, APRN  •  atorvastatin (LIPITOR) tablet 5 mg, 5 mg, Oral, Nightly, Marisela Kaufman, APRN  •  baclofen (LIORESAL) tablet 10 mg, 10 mg, Oral, Nightly PRN, Marisela Kaufman APRN  •  [START ON 10/12/2019] BENEPROTEIN packet 1 packet, 1 packet, Nasogastric, Daily, Sabine Linton, LEO  •  bisacodyl (DULCOLAX) suppository 10 mg, 10 mg, Rectal, Daily PRN, Marisela Kaufman APRN  •  cholecalciferol (VITAMIN D3) tablet 1,000 Units, 1,000 Units, Oral, Daily, Marisela Kaufman, APRN  •  enoxaparin (LOVENOX) syringe 70 mg, 70 mg, Subcutaneous, Q12H, Lashay Snyder, APRN, 70 mg at 10/11/19 0729  •  fluconazole (DIFLUCAN) tablet 100 mg, 100 mg, Oral, Daily, Marisela Kaufman, APRN  •  hydrALAZINE (APRESOLINE) injection 10 mg, 10 mg, Intravenous, Q6H PRN, Marisela Kaufman, APRN  •  hydrocortisone-diphenhydramine-nystatin (MAGIC MOUTHWASH) suspension 15 mL, 15 mL, Swish & Spit, 4x Daily, Marisela Kaufman APRN  •  influenza vac split quad (FLUZONE,FLUARIX,AFLURIA) injection 0.5 mL, 0.5 mL, Intramuscular, During Hospitalization, Sarah Guajardo MD  •  magnesium hydroxide (MILK OF MAGNESIA) suspension 2400 mg/10mL 10 mL, 10 mL, Oral, Daily PRN,  Cheyenne Kaufmana RADAMES, APRN  •  Magnesium Sulfate 2 gram infusion - Mg less than or equal to 1.5 mg/dL, 2 g, Intravenous, PRN **OR** Magnesium Sulfate 1 gram infusion - Mg 1.6-1.9 mg/dL, 1 g, Intravenous, PRN, LeonardolamOrquidea zamorasea Z, APRN  •  melatonin tablet 5 mg, 5 mg, Oral, Nightly PRN, Orquidea Kaufmansea RADAMES, APRN  •  ondansetron (ZOFRAN) tablet 4 mg, 4 mg, Oral, Q6H PRN **OR** ondansetron (ZOFRAN) injection 4 mg, 4 mg, Intravenous, Q6H PRN, Marisela Kaufman, APRN  •  pantoprazole (PROTONIX) injection 40 mg, 40 mg, Intravenous, Q AM, Marisela Kaufman, APRN, 40 mg at 10/11/19 0728  •  Pharmacy Consult - Pharmacy to dose, , Does not apply, Continuous PRN, Lashay Snyder, ESPERANZA  •  Pharmacy to Dose enoxaparin (LOVENOX), , Does not apply, Continuous PRN, Cheyenne Kaufmana RADAMES, APRN  •  potassium chloride (K-DUR,KLOR-CON) CR tablet 40 mEq, 40 mEq, Oral, PRN **OR** potassium chloride (KLOR-CON) packet 40 mEq, 40 mEq, Oral, PRN **OR** potassium chloride 10 mEq in 100 mL IVPB, 10 mEq, Intravenous, Q1H PRN, Marisela Kaufman, APRN  •  sertraline (ZOLOFT) tablet 25 mg, 25 mg, Oral, Nightly, Orquidea Kaufmansea RADAMES, APRN  •  sodium chloride 0.9 % flush 10 mL, 10 mL, Intravenous, PRN, Kamran Maldonado MD  •  sodium chloride 0.9 % flush 10 mL, 10 mL, Intravenous, Q12H, Cheyenne Kaufmana RADAMES, APRN, 10 mL at 10/11/19 0729  •  sodium chloride 0.9 % flush 10 mL, 10 mL, Intravenous, PRN, Orquidea Kaufmansea RADAMES, APRN  •  sodium chloride 0.9 % infusion, 75 mL/hr, Intravenous, Continuous, Marisela Kaufman APRN, Last Rate: 75 mL/hr at 10/11/19 0733, 75 mL/hr at 10/11/19 0733  •  tamsulosin (FLOMAX) 24 hr capsule 0.4 mg, 0.4 mg, Oral, Nightly, Marisela Kaufman APRN    Results Review:  Lab Results (last 48 hours)     Procedure Component Value Units Date/Time    Magnesium [[id number]]  (Normal) Collected:  10/11/19 0732    Specimen:  Blood Updated:  10/11/19 0824     Magnesium 2.4 mg/dL     Basic Metabolic Panel [[id number]]  (Abnormal)  Collected:  10/11/19 0732    Specimen:  Blood Updated:  10/11/19 0824     Glucose 79 mg/dL      BUN 32 mg/dL      Creatinine 1.20 mg/dL      Sodium 151 mmol/L      Potassium 3.9 mmol/L      Chloride 114 mmol/L      CO2 27.0 mmol/L      Calcium 9.5 mg/dL      eGFR Non African Amer 56 mL/min/1.73      BUN/Creatinine Ratio 26.7     Anion Gap 13.9 mmol/L     Narrative:       The MDRD GFR formula is only valid for adults with stable renal function between ages 18 and 70.    CBC Auto Differential [[id number]]  (Abnormal) Collected:  10/11/19 0732    Specimen:  Blood Updated:  10/11/19 0754     WBC 7.50 10*3/mm3      RBC 4.92 10*6/mm3      Hemoglobin 15.3 g/dL      Hematocrit 46.2 %      MCV 93.9 fL      MCH 31.0 pg      MCHC 33.1 g/dL      RDW 15.7 %      RDW-SD 52.5 fl      MPV 8.3 fL      Platelets 157 10*3/mm3      Neutrophil % 76.6 %      Lymphocyte % 13.7 %      Monocyte % 9.1 %      Eosinophil % 0.4 %      Basophil % 0.2 %      Neutrophils, Absolute 5.80 10*3/mm3      Lymphocytes, Absolute 1.00 10*3/mm3      Monocytes, Absolute 0.70 10*3/mm3      Eosinophils, Absolute 0.00 10*3/mm3      Basophils, Absolute 0.00 10*3/mm3      nRBC 0.0 /100 WBC     Comprehensive Metabolic Panel [[id number]]  (Abnormal) Collected:  10/10/19 1440    Specimen:  Blood Updated:  10/10/19 1531     Glucose 109 mg/dL      BUN 29 mg/dL      Creatinine 1.30 mg/dL      Sodium 148 mmol/L      Potassium 4.6 mmol/L      Chloride 110 mmol/L      CO2 30.0 mmol/L      Calcium 10.3 mg/dL      Total Protein 7.2 g/dL      Albumin 3.10 g/dL      ALT (SGPT) 25 U/L      AST (SGOT) 31 U/L      Alkaline Phosphatase 81 U/L      Total Bilirubin 2.6 mg/dL      eGFR Non African Amer 51 mL/min/1.73      Globulin 4.1 gm/dL      A/G Ratio 0.8 g/dL      BUN/Creatinine Ratio 22.3     Anion Gap 12.6 mmol/L     Narrative:       The MDRD GFR formula is only valid for adults with stable renal function between ages 18 and 70.    Protime-INR [[id number]]  (Abnormal) Collected:   10/10/19 1505    Specimen:  Blood Updated:  10/10/19 1531     Protime 14.3 Seconds      INR 1.45    CBC & Differential [[id number]] Collected:  10/10/19 1440    Specimen:  Blood Updated:  10/10/19 1447    Narrative:       The following orders were created for panel order CBC & Differential.  Procedure                               Abnormality         Status                     ---------                               -----------         ------                     CBC Auto Differential[[id number]]        Abnormal            Final result                 Please view results for these tests on the individual orders.    CBC Auto Differential [[id number]]  (Abnormal) Collected:  10/10/19 1440    Specimen:  Blood Updated:  10/10/19 1447     WBC 10.20 10*3/mm3      RBC 5.39 10*6/mm3      Hemoglobin 16.7 g/dL      Hematocrit 49.5 %      MCV 91.9 fL      MCH 30.9 pg      MCHC 33.6 g/dL      RDW 15.5 %      RDW-SD 49.9 fl      MPV 7.8 fL      Platelets 193 10*3/mm3      Neutrophil % 82.6 %      Lymphocyte % 9.3 %      Monocyte % 7.6 %      Eosinophil % 0.2 %      Basophil % 0.3 %      Neutrophils, Absolute 8.40 10*3/mm3      Lymphocytes, Absolute 0.90 10*3/mm3      Monocytes, Absolute 0.80 10*3/mm3      Eosinophils, Absolute 0.00 10*3/mm3      Basophils, Absolute 0.00 10*3/mm3      nRBC 0.0 /100 WBC         Imaging Results (last 72 hours)     Procedure Component Value Units Date/Time    XR Chest PA & Lateral [[id number]] Collected:  10/10/19 2205     Updated:  10/10/19 2211    Narrative:       Examination: XR CHEST PA AND LATERAL-     Date of Exam: 10/10/2019 7:45 PM     Indication: rule out aspiration pneumonia; R13.10-Dysphagia,  unspecified; R13.12-Dysphagia, oropharyngeal phase.     Comparison: 03/22/2019     Technique: 2 radiographic views of the chest were obtained.     Findings:  There is mild cardiomegaly. There has been a median sternotomy and  coronary artery bypass. There is rotation and tortuosity of the  aorta.  Dilatation of the aorta is not excluded. There is mild airspace opacity  in the left lower lung. Right lung is clear. No significant bone  abnormality is noted.       Impression:       1. Mild left basal airspace opacity could be due to pneumonia or  aspiration.  2. Cardiomegaly.  3. Aortic calcification and tortuosity. Aortic dilatation is not  excluded.     Electronically Signed By-Nya Baez On:10/10/2019 10:09 PM  This report was finalized on [id number] by  Nya Baez, .    CT Head Without Contrast [[id number]] Collected:  10/10/19 1634     Updated:  10/10/19 1640    Narrative:          DATE OF EXAM:  10/10/2019 3:36 PM     PROCEDURE:   CT HEAD WO CONTRAST-     INDICATIONS:   Focal neuro deficit, difficulty swallowing.     COMPARISON:  No Comparisons Available     TECHNIQUE:   Routine transaxial cuts were obtained through the head without the  administration of contrast. Automated exposure control and iterative  reconstruction methods were used.      FINDINGS:  There is enlargement of the cerebral sulci, fissures, ventricles, and  basal cisterns consistent with advanced cerebral atrophy. There are  areas of decreased density in the white matter tracks which is  nonspecific, but usually indicates chronic microvascular ischemia. There  are old lacunar infarcts seen in the basal ganglia bilaterally. There is  no acute hemorrhage, midline shift, or suspicious extra-axial fluid  collections. The orbital contents are normal. The paranasal and mastoid  sinuses are clear.        Impression:          1. Advanced cerebral atrophy.  2. Chronic ischemic changes.  3. No acute findings.     Electronically Signed By-Juancho Parham On:10/10/2019 4:38 PM  This report was finalized on [id number] by  Juancho Parham, .          Review of Systems:  Review of Systems   Unable to perform ROS: Mental status change       Physical Assessment:                                                                 Multiple abrasions  occurring to the right buttock, thigh and hip area as well as to the left lower extremity.  The morphology of the abrasions are linear in nature and would be inconsistent with a pressure injury nor are or the areas over a bony prominence.  So it is unclear as to the etiology of the wounds but they are all partial-thickness and in various stages of healing.  The base of the injuries are pink and clean with scant serous exudate  to the areas and there are no symptoms of infection noted.    Recommendation and Plan  Multiple abrasions, recommend to protect the areas and apply silicone foam border island dressings, this will also help decrease friction and shear to the areas.    Mony Serrato, ESPERANZA   10/11/2019   10:45 AM

## 2019-10-11 NOTE — PROGRESS NOTES
Discharge Planning Assessment  [facility]     Patient Name: Anuj Escobedo  MRN: [id number]  Today's Date: 10/11/2019    Admit Date: 10/10/2019    Discharge Needs Assessment     Row Name 10/11/19 1521       Living Environment    Lives With  spouse    Current Living Arrangements  home/apartment/condo from home with wife, came in from inpatient rehab at [facility]    Primary Care Provided by  self    Provides Primary Care For  no one    Family Caregiver if Needed  spouse    Quality of Family Relationships  helpful;involved;supportive    Able to Return to Prior Arrangements  yes       Resource/Environmental Concerns    Resource/Environmental Concerns  none    Transportation Concerns  car, none       Transition Planning    Patient/Family Anticipates Transition to  inpatient rehabilitation facility    Transportation Anticipated  family or friend will provide       Discharge Needs Assessment    Readmission Within the Last 30 Days  no previous admission in last 30 days    Concerns to be Addressed  denies needs/concerns at this time;no discharge needs identified    Equipment Currently Used at Home  none    Anticipated Changes Related to Illness  inability to care for self    Equipment Needed After Discharge  none        Discharge Plan     Row Name 10/11/19 1527       Plan    Plan  Anticipate return to [facility], OT eval pending.     Patient/Family in Agreement with Plan  yes    Plan Comments  Met with patient and family at bedside. Patient's wife reports patient was fully independent as of approximatly 1 month ago and has been at [facility] since most recent discharge. Patient's wife does hope that patient will return to [facility] at time of discharge. PT does recommend inpatient rehab, OT eval pending. JAYJAY Cherry and Reyna/myla Akins, notified and are researching to see if patient was there for acute or subacute. Plan is for PEG tube placement 10/14.             Functional Status     Row Name 10/11/19 1520       Functional Status    Usual  Activity Tolerance  moderate    Current Activity Tolerance  fair       Functional Status, IADL    Medications  independent    Meal Preparation  independent    Housekeeping  independent    Laundry  independent    Shopping  independent       Mental Status    General Appearance WDL  WDL       Mental Status Summary    Recent Changes in Mental Status/Cognitive Functioning  no changes        Tonya Maddox  [phone]

## 2019-10-12 ENCOUNTER — INPATIENT HOSPITAL (OUTPATIENT)
Dept: URBAN - METROPOLITAN AREA HOSPITAL 84 | Facility: HOSPITAL | Age: 84
End: 2019-10-12
Payer: COMMERCIAL

## 2019-10-12 DIAGNOSIS — B37.0 CANDIDAL STOMATITIS: ICD-10-CM

## 2019-10-12 DIAGNOSIS — R63.4 ABNORMAL WEIGHT LOSS: ICD-10-CM

## 2019-10-12 DIAGNOSIS — I10 ESSENTIAL (PRIMARY) HYPERTENSION: ICD-10-CM

## 2019-10-12 DIAGNOSIS — R13.10 DYSPHAGIA, UNSPECIFIED: ICD-10-CM

## 2019-10-12 DIAGNOSIS — Z90.49 ACQUIRED ABSENCE OF OTHER SPECIFIED PARTS OF DIGESTIVE TRACT: ICD-10-CM

## 2019-10-12 LAB
ALBUMIN SERPL-MCNC: 2.3 G/DL (ref 3.5–4.8)
ALBUMIN/GLOB SERPL: 0.8 G/DL (ref 1–1.7)
ALP SERPL-CCNC: 67 U/L (ref 32–91)
ALT SERPL W P-5'-P-CCNC: 18 U/L (ref 17–63)
ANION GAP SERPL CALCULATED.3IONS-SCNC: 9.5 MMOL/L (ref 5–15)
AST SERPL-CCNC: 28 U/L (ref 15–41)
BILIRUB SERPL-MCNC: 1.8 MG/DL (ref 0.3–1.2)
BUN BLD-MCNC: 32 MG/DL (ref 8–20)
BUN/CREAT SERPL: 29.1 (ref 6.2–20.3)
CA-I SERPL ISE-MCNC: 1.3 MMOL/L (ref 1.2–1.3)
CALCIUM SPEC-SCNC: 9.1 MG/DL (ref 8.9–10.3)
CHLORIDE SERPL-SCNC: 116 MMOL/L (ref 101–111)
CO2 SERPL-SCNC: 31 MMOL/L (ref 22–32)
CREAT BLD-MCNC: 1.1 MG/DL (ref 0.7–1.2)
GFR SERPL CREATININE-BSD FRML MDRD: 62 ML/MIN/1.73
GLOBULIN UR ELPH-MCNC: 3 GM/DL (ref 2.5–3.8)
GLUCOSE BLD-MCNC: 113 MG/DL (ref 65–99)
GLUCOSE BLDC GLUCOMTR-MCNC: 108 MG/DL (ref 70–105)
MAGNESIUM SERPL-MCNC: 2.3 MG/DL (ref 1.8–2.5)
PHOSPHATE SERPL-MCNC: 2.3 MG/DL (ref 2.4–4.7)
POTASSIUM BLD-SCNC: 3.5 MMOL/L (ref 3.6–5.1)
PREALB SERPL-MCNC: 5 MG/DL (ref 16–38)
PROT SERPL-MCNC: 5.3 G/DL (ref 6.1–7.9)
SODIUM BLD-SCNC: 153 MMOL/L (ref 136–144)

## 2019-10-12 PROCEDURE — 84100 ASSAY OF PHOSPHORUS: CPT | Performed by: DIETITIAN, REGISTERED

## 2019-10-12 PROCEDURE — 99232 SBSQ HOSP IP/OBS MODERATE 35: CPT | Performed by: INTERNAL MEDICINE

## 2019-10-12 PROCEDURE — 82962 GLUCOSE BLOOD TEST: CPT

## 2019-10-12 PROCEDURE — 84134 ASSAY OF PREALBUMIN: CPT | Performed by: INTERNAL MEDICINE

## 2019-10-12 PROCEDURE — 99231 SBSQ HOSP IP/OBS SF/LOW 25: CPT | Performed by: NURSE PRACTITIONER

## 2019-10-12 PROCEDURE — 82330 ASSAY OF CALCIUM: CPT | Performed by: INTERNAL MEDICINE

## 2019-10-12 PROCEDURE — 80053 COMPREHEN METABOLIC PANEL: CPT | Performed by: INTERNAL MEDICINE

## 2019-10-12 PROCEDURE — 25010000002 ENOXAPARIN PER 10 MG: Performed by: NURSE PRACTITIONER

## 2019-10-12 PROCEDURE — 97110 THERAPEUTIC EXERCISES: CPT

## 2019-10-12 PROCEDURE — 97112 NEUROMUSCULAR REEDUCATION: CPT

## 2019-10-12 PROCEDURE — 83735 ASSAY OF MAGNESIUM: CPT | Performed by: DIETITIAN, REGISTERED

## 2019-10-12 PROCEDURE — 25010000002 FLUCONAZOLE PER 200 MG: Performed by: INTERNAL MEDICINE

## 2019-10-12 PROCEDURE — 92526 ORAL FUNCTION THERAPY: CPT

## 2019-10-12 PROCEDURE — 25010000003 POTASSIUM CHLORIDE 10 MEQ/100ML SOLUTION: Performed by: NURSE PRACTITIONER

## 2019-10-12 RX ORDER — METHOCARBAMOL 100 MG/ML
500 INJECTION, SOLUTION INTRAMUSCULAR; INTRAVENOUS NIGHTLY PRN
Status: DISCONTINUED | OUTPATIENT
Start: 2019-10-12 | End: 2019-10-21 | Stop reason: HOSPADM

## 2019-10-12 RX ORDER — FLUCONAZOLE 2 MG/ML
200 INJECTION, SOLUTION INTRAVENOUS DAILY
Status: DISCONTINUED | OUTPATIENT
Start: 2019-10-12 | End: 2019-10-21 | Stop reason: HOSPADM

## 2019-10-12 RX ADMIN — NYSTATIN 15 ML: 100000 SUSPENSION ORAL at 09:00

## 2019-10-12 RX ADMIN — NYSTATIN 15 ML: 100000 SUSPENSION ORAL at 18:17

## 2019-10-12 RX ADMIN — NYSTATIN 15 ML: 100000 SUSPENSION ORAL at 20:25

## 2019-10-12 RX ADMIN — Medication 10 ML: at 09:22

## 2019-10-12 RX ADMIN — PANTOPRAZOLE SODIUM 40 MG: 40 INJECTION, POWDER, FOR SOLUTION INTRAVENOUS at 05:41

## 2019-10-12 RX ADMIN — NYSTATIN 15 ML: 100000 SUSPENSION ORAL at 12:16

## 2019-10-12 RX ADMIN — FLUCONAZOLE, SODIUM CHLORIDE 200 MG: 2 INJECTION INTRAVENOUS at 12:15

## 2019-10-12 RX ADMIN — Medication 10 ML: at 20:25

## 2019-10-12 RX ADMIN — Medication 10 ML: at 09:23

## 2019-10-12 RX ADMIN — ENOXAPARIN SODIUM 70 MG: 80 INJECTION SUBCUTANEOUS at 20:25

## 2019-10-12 RX ADMIN — POTASSIUM ACETATE: 3.93 INJECTION, SOLUTION, CONCENTRATE INTRAVENOUS at 18:15

## 2019-10-12 RX ADMIN — ENOXAPARIN SODIUM 70 MG: 80 INJECTION SUBCUTANEOUS at 09:28

## 2019-10-12 RX ADMIN — POTASSIUM CHLORIDE 10 MEQ: 10 INJECTION, SOLUTION INTRAVENOUS at 05:49

## 2019-10-12 NOTE — PROGRESS NOTES
Nutrition Services    Patient Name:  Anuj Escobedo  YOB: 1925  MRN: [id number]  Admit Date:  10/10/2019    Reviewed labs for TPN and spoke with [facility] Francisca. Per MD notes unable to place DHT and TPN started on 10/11. Pt currently receiving Clinimix 5/15 1000 mL volume.    Labs: Gluc 113/108, Na 153 H, K 3.5 L, BUN 32 H, Ph 2.3 L, Mg 2.3, PAB 5.0 L    Continuing with same TPN at this time. [facility] states pt only received ~7 hours of TPN before labs were drawn, replacing potassium and phosphorus. May consider starting lipids tomorrow, plan to check TG.     PEG placement is planned for 10/14, SLP is following and recommends video swallow when able.    TPN Goal: Clinimix 5/15 2160 mL volume with 250 mL 20% Lipids 3x/week.    RDN will continue to follow as planned.      Electronically signed by:  Merly Andersen RD  10/12/19 10:26 AM

## 2019-10-12 NOTE — THERAPY TREATMENT NOTE
Acute Care - Speech Language Pathology   Swallow Treatment Note  [facility]     Patient Name: Anuj Escobedo  : 1925  MRN: [id number]  Today's Date: 10/12/2019               Admit Date: 10/10/2019    Visit Dx:      ICD-10-CM ICD-9-CM   1. Dysphagia, unspecified type R13.10 787.20   2. Oropharyngeal dysphagia R13.12 787.22     Patient Active Problem List   Diagnosis   • Dysphagia   • Atrial fibrillation, chronic   • Dyslipidemia   • Degenerative arthritis   • Coronary artery disease   • Benign prostatic hyperplasia   • Hypertension   • Long term current use of anticoagulant therapy   • Mixed hyperlipidemia   • Peripheral edema   • Primary malignant neoplasm of head (CMS/HCC)   • Shingles   • Oropharyngeal dysphagia   • Abrasions of multiple sites       Therapy Treatment  Rehabilitation Treatment Summary     Row Name 10/12/19 1200          Discipline  speech language pathologist  -MC    Document Type  re-evaluation;therapy note (daily note)  -MC    Comment  Pt seen for re-evaluation of swallow to determine timeline for objective swallow exam. RN reports pt demonstrates overt s/s aspiration with oral care. Currently, pt is on TPN due to unsuccessful Dobbhoff insertion by bedside nurses and by radiology, patient's family yesterday declined endoscopic insertion of Dobbhoff. Pending PEG placement on Monday 10/14. Pt with known h/o dysphagia, previously on HTL from VFSS completed at [facility] last month. Pt now NPO due to intolerance of PO diet.   -    Recorded by [] Mercedez Araya, SLP 10/12/19 1215     Mercedez Araya SLP 19 -  SLP       SLP GOALS     Row Name 10/12/19 1200          Oral Nutrition/Hydration Goal 1, SLP  Pt will participate in a VFSS to further assess pharyngeal stage of swallow.  -    Barriers (Oral Nutrition/Hydration Goal 1, SLP)  Pt seen for re-assessment to determine timeline/appropriateness of VFSS. Pt will require VFSS prior to any recommendations regarding re-initiation of a PO diet  however he was not appropriate this date due to the following described presentation: Pt seen in room sleeping, easily alerts to soft verbal stim but demonstrates difficulty sustaining alertness. Oral care was completed & pt was provided with an ice chip. There is significant difficulty obtaining labial seal on spoon followed by severely prolonged oral phase characterized by minimal & interrupted oral manipulation. Pt required continuous cues to both resume oral manipulation & swallow as well as sustain alertness. Wet/gurgly vocal quality & respirations followed. A volitionally cued cough was weak. No further trials given due to serverity of both oral & pharyngeal dysphagia & poor alertness. Given severity of dysphagia accounting for pt's current admission, recommend continue NPO with VFSS following planned PEG on 10/14.  Will f/u Monday & resume ongoing re-evaluation and establish plan for VFSS completion as appropriate.   -MC    Progress/Outcomes (Oral Nutrition/Hydration Goal 1, SLP)  goal not met  -MC          Oral Nutrition/Hydration Goal 2, SLP  Establish PO diet  -MC    Barriers (Oral Nutrition/Hydration Goal 2, SLP)  Pt will require VFSS prior to making determinations regarding PO diet. Not appropriate for VFSS this date, see above.    -MC    Progress/Outcomes (Oral Nutrition/Hydration Goal 2, SLP)  goal not met  -MC      User Key  (r) = Recorded By, (t) = Taken By, (c) = Cosigned By    Initials Name Provider Type    Mercedez Ortega, SLP Speech and Language Pathologist          EDUCATION  The patient & RN have been educated in the following areas:   Dysphagia (Swallowing Impairment) Oral Care/Hydration NPO rationale.    SLP Recommendation and Plan           recommend continue NPO with VFSS after PEG placement which is tentatively planned for 10/14.  Will f/u Monday & resume ongoing re-evaluation for VFSS as appropriate.                     Mercedez Araya, SLP  10/12/2019

## 2019-10-12 NOTE — PLAN OF CARE
Problem: Patient Care Overview  Goal: Plan of Care Review  Outcome: Ongoing (interventions implemented as appropriate)   10/12/19 1120   Coping/Psychosocial   Plan of Care Reviewed With patient   Plan of Care Review   Progress improving   OTHER   Outcome Summary pt more alert today and able to participate, was able to CTS with mod x2 and cues to bring trunk forward over feet. Will need rehab at [place]

## 2019-10-12 NOTE — PROGRESS NOTES
LOS: 2 days   Patient Care Team:  Devon Arriaza Jr., MD as PCP - General      Subjective  - still c/o dysphagia    Interval History: Dobbhoff placement failed despite attempts by multiple experience nurses and by radiology under fluoroscopy.  PICC line was inserted yesterday and he has been started on TPN.    ROS:   No chest pain, shortness of breath, or cough.    Medication Review:     Current Facility-Administered Medications:   •  Adult Central Clinimix TPN, , Intravenous, Q24H (TPN), Kamran Maldonado MD, Last Rate: 43 mL/hr at 10/11/19 2027  •  Adult Central Clinimix TPN, , Intravenous, Q24H (TPN), Kamran Maldonado MD  •  aluminum-magnesium hydroxide-simethicone (MAALOX MAX) 400-400-40 MG/5ML suspension 15 mL, 15 mL, Oral, Q6H PRN, Marisela Kaufman APRN  •  atorvastatin (LIPITOR) tablet 5 mg, 5 mg, Oral, Nightly, Marisela Kaufman APRN  •  baclofen (LIORESAL) tablet 10 mg, 10 mg, Oral, Nightly PRN, Marisela Kaufman APRALEKSANDR  •  BENEPROTEIN packet 1 packet, 1 packet, Nasogastric, Daily, Sabine Linton, LEO  •  bisacodyl (DULCOLAX) suppository 10 mg, 10 mg, Rectal, Daily PRN, Marisela Kafuman APRN  •  cholecalciferol (VITAMIN D3) tablet 1,000 Units, 1,000 Units, Oral, Daily, Marisela Kaufman APRALEKSANDR  •  enoxaparin (LOVENOX) syringe 70 mg, 70 mg, Subcutaneous, Q12H, Lashay Snyder, APRN, 70 mg at 10/12/19 0928  •  fluconazole (DIFLUCAN) IVPB 200 mg, 200 mg, Intravenous, Daily, Sarah Guajardo MD  •  hydrALAZINE (APRESOLINE) injection 10 mg, 10 mg, Intravenous, Q6H PRN, Marisela Kaufman APRALEKSANDR  •  hydrocortisone-diphenhydramine-nystatin (MAGIC MOUTHWASH) suspension 15 mL, 15 mL, Swish & Spit, 4x Daily, Marisela Kaufman, APRN, 15 mL at 10/12/19 0900  •  influenza vac split quad (FLUZONE,FLUARIX,AFLURIA) injection 0.5 mL, 0.5 mL, Intramuscular, During Hospitalization, Sarah Guajardo MD  •  magnesium hydroxide (MILK OF MAGNESIA) suspension 2400 mg/10mL 10 mL, 10 mL, Oral, Daily  PRN, LeonardolamsOrquideaMarisela RADAMES, APRN  •  melatonin tablet 5 mg, 5 mg, Oral, Nightly PRN, LeonardolamOrqudiea zamorasea RADAMES, APRN  •  ondansetron (ZOFRAN) tablet 4 mg, 4 mg, Oral, Q6H PRN **OR** ondansetron (ZOFRAN) injection 4 mg, 4 mg, Intravenous, Q6H PRN, Cheyenne Kaufmana RADAMES, APRN  •  pantoprazole (PROTONIX) injection 40 mg, 40 mg, Intravenous, Q AM, Cheyenne Kaufmana RADAMES, APRN, 40 mg at 10/12/19 0541  •  Pharmacy Consult - Pharmacy to dose, , Does not apply, Continuous PRN, Lashay Snyder APRN  •  Pharmacy to Dose enoxaparin (LOVENOX), , Does not apply, Continuous PRN, Marisela Kaufman, APRN  •  Pharmacy to Dose TPN, , Does not apply, Continuous PRN, Annette Phan APRN  •  potassium chloride (K-DUR,KLOR-CON) CR tablet 40 mEq, 40 mEq, Oral, PRN **OR** potassium chloride (KLOR-CON) packet 40 mEq, 40 mEq, Oral, PRN **OR** potassium chloride 10 mEq in 100 mL IVPB, 10 mEq, Intravenous, Q1H PRN, Marisela Kaufman, APRN, Last Rate: 100 mL/hr at 10/12/19 0549, 10 mEq at 10/12/19 0549  •  sertraline (ZOLOFT) tablet 25 mg, 25 mg, Oral, Nightly, Cheyenne Kaufmana RADAMES, APRN  •  sodium chloride 0.9 % flush 10 mL, 10 mL, Intravenous, PRN, Kamran Maldonado MD  •  sodium chloride 0.9 % flush 10 mL, 10 mL, Intravenous, Q12H, Marisela Kaufman, APRN, 10 mL at 10/11/19 2030  •  sodium chloride 0.9 % flush 10 mL, 10 mL, Intravenous, PRN, LeonardolamCheynene zamoraa RADAMES, APRN  •  sodium chloride 0.9 % flush 10 mL, 10 mL, Intravenous, Q12H, Annette Phan APRN, 10 mL at 10/12/19 0923  •  sodium chloride 0.9 % flush 10 mL, 10 mL, Intravenous, Q12H, Annette Phan APRN, 10 mL at 10/12/19 0922  •  sodium chloride 0.9 % flush 10 mL, 10 mL, Intravenous, PRN, Annette Phan APRN  •  sodium chloride 0.9 % flush 20 mL, 20 mL, Intravenous, PRN, Annette Phan APRN  •  tamsulosin (FLOMAX) 24 hr capsule 0.4 mg, 0.4 mg, Oral, Nightly, Marisela Kaufman APRN      Objective     Vital Signs  Vitals:    10/11/19 0847 10/11/19 1148 10/11/19 2205 10/12/19 0527   BP:  " 109/66 122/77    BP Location:  Right arm     Patient Position:  Lying     Pulse:  74 72 64   Resp:  16 18 15   Temp:  97.9 °F (36.6 °C) 97.4 °F (36.3 °C) 97.4 °F (36.3 °C)   TempSrc:  Oral     SpO2:  93% 95% 96%   Weight:    71.4 kg (157 lb 6.5 oz)   Height: 177.8 cm (70\")          Physical Exam:    General Appearance:    Awake and alert, in no acute distress, hard of hearing, no family at bedside, thin habitus   Head:    Normocephalic, without obvious abnormality   Eyes:          Conjunctivae normal, anicteric sclera   Ears:    Hearing intact   Throat:   No oral lesions, no thrush, oral mucosa moist   Neck:   No adenopathy, supple, no JVD   Lungs:     Clear to auscultation bilaterally, respirations regular, even and unlabored    Heart:   Irregularly irregular rhythm, normal rate, normal S1 and S2, no            Murmurs appreciated   Abdomen:     Normal bowel sounds, soft, non-tender, no rebound or guarding, non-distended, no hepatosplenomegaly   Rectal:     Deferred   Extremities:   No edema, no cyanosis   Skin:   No bruising or rash, no jaundice   Neurologic:  Left-sided weakness noted        Results Review:    Lab Results (last 24 hours)     Procedure Component Value Units Date/Time    POC Glucose Once [[id number]]  (Abnormal) Collected:  10/12/19 0613    Specimen:  Blood Updated:  10/12/19 0615     Glucose 108 mg/dL      Comment: Serial Number: [id number]:  [id number]       Comprehensive Metabolic Panel [[id number]]  (Abnormal) Collected:  10/12/19 0325    Specimen:  Blood Updated:  10/12/19 0507     Glucose 113 mg/dL      BUN 32 mg/dL      Creatinine 1.10 mg/dL      Sodium 153 mmol/L      Potassium 3.5 mmol/L      Chloride 116 mmol/L      CO2 31.0 mmol/L      Calcium 9.1 mg/dL      Total Protein 5.3 g/dL      Albumin 2.30 g/dL      ALT (SGPT) 18 U/L      AST (SGOT) 28 U/L      Alkaline Phosphatase 67 U/L      Total Bilirubin 1.8 mg/dL      eGFR Non African Amer 62 mL/min/1.73      Globulin 3.0 gm/dL      " A/G Ratio 0.8 g/dL      BUN/Creatinine Ratio 29.1     Anion Gap 9.5 mmol/L     Narrative:       The MDRD GFR formula is only valid for adults with stable renal function between ages 18 and 70.    Prealbumin [[id number]]  (Abnormal) Collected:  10/12/19 0325    Specimen:  Blood Updated:  10/12/19 0501     Prealbumin 5.0 mg/dL     Magnesium [[id number]]  (Normal) Collected:  10/12/19 0325    Specimen:  Blood Updated:  10/12/19 0501     Magnesium 2.3 mg/dL     Phosphorus [[id number]]  (Abnormal) Collected:  10/12/19 0325    Specimen:  Blood Updated:  10/12/19 0501     Phosphorus 2.3 mg/dL     Calcium, Ionized [[id number]]  (Normal) Collected:  10/12/19 0325    Specimen:  Blood Updated:  10/12/19 0446     Ionized Calcium 1.30 mmol/L           Imaging Results (last 24 hours)     Procedure Component Value Units Date/Time    XR Chest 1 View [[id number]] Collected:  10/11/19 2025     Updated:  10/11/19 2032    Narrative:       Examination: XR CHEST 1 VW-     Date of Exam: 10/11/2019 7:50 PM     Indication: picc line placement; R13.10-Dysphagia, unspecified;  R13.12-Dysphagia, oropharyngeal phase.       Comparison: 10/10/2019     Technique: 1 view of the chest      FINDINGS:  There is a right-sided PICC line with its tip in the SVC. It looks like  the distal end of the tube is seen in on, without significant extension  of the tube superiorly. Heart size is enlarged. There has been a  coronary artery bypass. There is no airspace opacity today; the left  lung appears clear. No pleural effusion is noted. No bone lesion is  seen.       Impression:       1. The PICC line tip is in the SVC. The distal end may be directed  anteriorly or posteriorly within the cava. It does not appear to be  significantly folded or looped.  2. Cardiomegaly. No evidence of active lung disease.     Electronically Signed By-Nya Baez On:10/11/2019 8:30 PM  This report was finalized on [id number] by  Nya Baez, .    FL GI Tube Placement [[id number]]  Collected:  10/11/19 1509     Updated:  10/11/19 1512    Narrative:       DATE OF EXAM:  10/11/2019 2:12 PM     PROCEDURE:  FL GI TUBE PLACEMENT-     INDICATIONS:  DOBHOFF TUBE PLACEMENT D/T DYSPHAGIA; R13.10-Dysphagia, unspecified;  R13.12-Dysphagia, oropharyngeal phase     COMPARISON:  Chest 2 views 10/10/2019     TECHNIQUE:   A Naso- or graciela-gastric tube was placed in the radiology department.   Digital films were obtained for confirmation     FINDINGS:  Multiple attempts were made to place a nasogastric tube under  fluoroscopic guidance. Despite repeated attempts, the tube was not able  to be placed successfully in the stomach. Total fluoroscopy time 6.58  minutes.       Impression:       Nasogastric tube placement under fluoroscopy unsuccessful despite  multiple attempts.     Electronically Signed By-Rodolfo Pearce On:10/11/2019 3:10 PM  This report was finalized on [id number] by  Rodolfo Pearce, .            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  Patient's family yesterday declined endoscopic insertion of Dobbhoff.  Eliquis remains on hold.  Pharmacy is dosing lovenox bid that will be held after evening dose 10/13/19.  Tentatively planning EGD with PEG 10/14/19  Supportive care.     Lashay Snyder, ESPERANZA  10/12/19  11:36 AM

## 2019-10-12 NOTE — THERAPY TREATMENT NOTE
Acute Care - Physical Therapy Treatment Note   [facility]     Patient Name: Anuj Escobedo  : 1925  MRN: [id number]  Today's Date: 10/12/2019             Admit Date: 10/10/2019    Visit Dx:    ICD-10-CM ICD-9-CM   1. Dysphagia, unspecified type R13.10 787.20   2. Oropharyngeal dysphagia R13.12 787.22     Patient Active Problem List   Diagnosis   • Dysphagia   • Atrial fibrillation, chronic   • Dyslipidemia   • Degenerative arthritis   • Coronary artery disease   • Benign prostatic hyperplasia   • Hypertension   • Long term current use of anticoagulant therapy   • Mixed hyperlipidemia   • Peripheral edema   • Primary malignant neoplasm of head (CMS/HCC)   • Shingles   • Oropharyngeal dysphagia   • Abrasions of multiple sites       Therapy Treatment    Rehabilitation Treatment Summary     Row Name 10/12/19 1200 10/12/19 1120          Treatment Time/Intention    Discipline  speech language pathologist  -  physical therapy assistant  -[facility]     Document Type  re-evaluation;therapy note (daily note)  -  therapy note (daily note)  -[facility]     Subjective Information  --  no complaints  -[facility]     Mode of Treatment  --  physical therapy  -[facility]     Therapy Frequency (PT Clinical Impression)  --  daily  -[facility]     Comment  Pt seen for re-evaluation of swallow to determine timeline for objective swallow exam. RN reports pt demonstrates overt s/s aspiration with oral care. Currently, pt is on TPN due to refeeding syndrome, pending PEG placement on Monday 10/14. Pt with known h/o dysphagia, previously on HTL from VFSS completed at [facility] last month. Pt now NPO due to intolerance of PO diet.   -  --     Existing Precautions/Restrictions  --  -- falls, npo, getting PEG 10/14, TPN  -[facility]     Recorded by [] Mercedez Araya, SLP 10/12/19 1215 [[facility]] Yuly Mike, PTA 10/12/19 1442     Row Name 10/12/19 1120             Vital Signs    Pre Patient Position  Supine alarm  -[facility]      Post Patient Position  -- supine, alarm  -[facility]       Recorded by [[facility]] Yuly Mike, PTA 10/12/19 1442      Row Name 10/12/19 1120             Bed Mobility Assessment/Treatment    Bed Mobility Assessment/Treatment  bed mobility (all) activities  -[facility]      [place] Level (Bed Mobility)  -- modx2  -MCA      Supine-Sit [place] (Bed Mobility)  -- mod x2  -[facility]      Recorded by [[facility]] Yuly Mike, PTA 10/12/19 1442      Row Name 10/12/19 1120             Sit-Stand Transfer    Sit-Stand [place] (Transfers)  -- modx2 HHA, post lean, tolerated 40 secs x2  -[facility]      Recorded by [[facility]] Yuly Mike, PTA 10/12/19 1442      Row Name 10/12/19 1120             Motor Skills Assessment/Interventions    Additional Documentation  Therapeutic Exercise (Group)  -[facility]      Recorded by [[facility]] Yuly Mike, PTA 10/12/19 1442      Row Name 10/12/19 1120             Therapeutic Exercise    Lower Extremity (Therapeutic Exercise)  -- seated exs with emphasis on keeping midline orientation  -[facility]      Recorded by [[facility]] Yuly Mike, PTA 10/12/19 1442      Row Name 10/12/19 1120             Static Sitting Balance    Level of [place] (Unsupported Sitting, Static Balance)  contact guard assist , vc's to keep trunk forward  -MCA      Sitting Position (Unsupported Sitting, Static Balance)  sitting on edge of bed  -[facility]      Recorded by [[facility]] Yuly Mike, PTA 10/12/19 1442      Row Name 10/12/19 1120             Static Standing Balance    Level of [place] (Supported Standing, Static Balance)  -- modx2 to keep trunk forward over feet  -MCA      Recorded by [[facility]] Yuly Mike, PTA 10/12/19 1442      Row Name 10/12/19 1120             Pain Assessment    Additional Documentation  -- no c/o  -[facility]      Recorded by [[facility]] Yuly Mike, PTA 10/12/19 1442      Row Name                Wound 10/10/19 1809 Left anterior;lower leg Abrasion    Wound - Properties Group Date first assessed: 10/10/19 [CR] Time first assessed: 1809 [CR] Side: Left [CR]  Orientation: anterior;lower [CR] Location: leg [CR] Primary Wound Type: Abrasion [CR] Recorded by:  [CR] Marzena Tolentino RN 10/10/19 1809    Row Name                Wound 10/10/19 1809 Right gluteal Skin Tear    Wound - Properties Group Date first assessed: 10/10/19 [CR] Time first assessed: 1809 [CR] Side: Right [CR] Location: gluteal [CR] Primary Wound Type: Skin tear [CR] Recorded by:  [CR] Marzena Tolentino RN 10/10/19 1809    Row Name                Wound 10/10/19 1810 Right gluteal Abrasion    Wound - Properties Group Date first assessed: 10/10/19 [CR] Time first assessed: 1810 [CR] Side: Right [CR] Location: gluteal [CR] Primary Wound Type: Abrasion [CR] Recorded by:  [CR] Marzena Toelntino RN 10/10/19 1810    Row Name 10/12/19 1120             Outcome Summary/Treatment Plan (PT)    Anticipated Discharge Disposition (PT)  inpatient rehabilitation facility  -[facility]      Recorded by [[facility]] Yuly Mike PTA 10/12/19 1442        User Key  (r) = Recorded By, (t) = Taken By, (c) = Cosigned By    Initials Name Effective Dates Discipline    [facility] Yuly Mike PTA 03/01/19 -  PT    CR Marzena Tolentino RN 03/01/19 -  Nurse     Mercedez Araya SLP 03/01/19 -  SLP          Wound 10/10/19 1809 Left anterior;lower leg Abrasion (Active)   Dressing Appearance dry;intact;no drainage 10/12/2019  7:20 AM   Drainage Amount none 10/11/2019  8:00 PM   Dressing Care, Wound other (see comments) 10/12/2019  7:20 AM       Wound 10/10/19 1809 Right gluteal Skin Tear (Active)   Dressing Appearance intact;no drainage 10/12/2019  7:20 AM   Drainage Amount none 10/12/2019  7:20 AM   Dressing Care, Wound other (see comments) 10/12/2019  7:20 AM       Wound 10/10/19 1810 Right gluteal Abrasion (Active)   Dressing Appearance intact;dry;no drainage 10/12/2019  7:20 AM   Drainage Amount none 10/12/2019  7:20 AM   Dressing Care, Wound other (see comments) 10/12/2019  7:20 AM       Rehab Goal Summary     Row Name 10/12/19 1200              Swallow Goals (SLP)    Oral Nutrition/Hydration Goal Selection (SLP)  oral nutrition/hydration, SLP goal 1;oral nutrition/hydration, SLP goal 2  -MC         Oral Nutrition/Hydration Goal 1 (SLP)    Oral Nutrition/Hydration Goal 1, SLP  Pt will participate in a VFSS to further assess pharyngeal stage of swallow.  -MC      Barriers (Oral Nutrition/Hydration Goal 1, SLP)  Pt seen for re-assessment to determine timeline/appropriateness of VFSS. Pt will require VFSS prior to any recommendations regarding re-initiation of a PO diet however he was not appropriate this date due to the following described presentation: Pt seen in room sleeping, easily alerts to soft verbal stim but demonstrates difficulty sustaining alertness. Oral care was completed & pt was provided with an ice chip. There is significant difficulty obtaining labial seal on spoon followed by severely prolonged oral phase characterized by minimal & interrupted oral manipulation. Pt required continuous cues to both resumme oral manipulation & swallow as well as sustain alertness. Wet/gurgly vocal quality & respirations followed. A volitionally cued cough was weak. No further trials given due to serverity of both oral & pharyngeal dysphagia & poor alertness. Given severity of dysphagia accounting for pt's current admission, recommend continue NPO with VFSS following planned PEG on 10/14.  Will f/u Monday & resume ongoing re-evaluation for VFSS as appropriate.   -MC      Progress/Outcomes (Oral Nutrition/Hydration Goal 1, SLP)  goal not met  -MC         Oral Nutrition/Hydration Goal 2 (SLP)    Oral Nutrition/Hydration Goal 2, SLP  Establish PO diet  -MC      Barriers (Oral Nutrition/Hydration Goal 2, SLP)  Pt will require VFSS prior to making determinations regarding PO diet. Not appropriate for VFSS this date, see above.    -MC      Progress/Outcomes (Oral Nutrition/Hydration Goal 2, SLP)  goal not met  -MC        User Key  (r) = Recorded By, (t) =  Taken By, (c) = Cosigned By    Initials Name Provider Type Discipline     Mercedez Araya, SLP Speech and Language Pathologist SLP          Physical Therapy Education     Title: PT OT SLP Therapies (Done)     Topic: Physical Therapy (Done)     Point: Mobility training (Done)     Learning Progress Summary           Patient Acceptance, E,TB, VU,NR by  at 10/12/2019  2:43 PM    Acceptance, E,TB, VU by  at 10/11/2019 12:27 PM   Family Acceptance, E,TB, VU by  at 10/11/2019 12:27 PM                   Point: Home exercise program (Done)     Learning Progress Summary           Patient Acceptance, E,TB, VU,NR by  at 10/12/2019  2:43 PM                   Point: Body mechanics (Done)     Learning Progress Summary           Patient Acceptance, E,TB, VU,NR by  at 10/12/2019  2:43 PM    Acceptance, E,TB, VU by  at 10/11/2019 12:27 PM   Family Acceptance, E,TB, VU by  at 10/11/2019 12:27 PM                   Point: Precautions (Done)     Learning Progress Summary           Patient Acceptance, E,TB, VU,NR by  at 10/12/2019  2:43 PM    Acceptance, E,TB, VU by  at 10/11/2019 12:27 PM   Family Acceptance, E,TB, VU by  at 10/11/2019 12:27 PM                               User Key     Initials Effective Dates Name Provider Type Discipline     03/01/19 -  Iris Kelley, PT Physical Therapist PT     03/01/19 -  Yuly Mike, PTA Physical Therapy Assistant PT                PT Recommendation and Plan  Anticipated Discharge Disposition (PT): inpatient rehabilitation facility  Therapy Frequency (PT Clinical Impression): daily  Outcome Summary/Treatment Plan (PT)  Anticipated Discharge Disposition (PT): inpatient rehabilitation facility  Plan of Care Reviewed With: patient  Progress: improving  Outcome Summary: pt more alert today and able to participate, was able to CTS with mod x2 and cues to bring trunk forward over feet. [place] need rehab at [place]  Outcome Measures     Row Name 10/12/19 1400              Modified [place] Scale    Modified [place] Scale  5 - Severe disability.  Bedridden, incontinent, and requiring constant nursing care and attention.  -        User Key  (r) = Recorded By, (t) = Taken By, (c) = Cosigned By    Initials Name Provider Type    Yuly Esposito PTA Physical Therapy Assistant         Time Calculation:   PT Charges     Row Name 10/12/19 1444             Time Calculation    Start Time  1120  -      Stop Time  1140  -      Time Calculation (min)  20 min  -      PT Received On  10/12/19  -      PT - Next Appointment  10/14/19  -         Time Calculation- PT    Total Timed Code Minutes- PT  20 minute(s)  -         Timed Charges    09848 - PT Therapeutic Exercise Minutes  10  -      04819 -  PT Neuromuscular Reeducation Minutes  10  -        User Key  (r) = Recorded By, (t) = Taken By, (c) = Cosigned By    Initials Name Provider Type    Yuly Esposito PTA Physical Therapy Assistant        Therapy Charges for Today     Code Description Service Date Service Provider Modifiers Qty    11236239895 HC PT NEUROMUSC RE EDUCATION EA 15 MIN 10/12/2019 Yuly Mike PTA GP 1    60033811092 HC PT THER PROC EA 15 MIN 10/12/2019 Yuly Mike PTA GP 1          PT G-Codes  Outcome Measure Options: Modified [place], AM-PAC 6 Clicks Basic Mobility (PT)  AM-PAC 6 Clicks Score (PT): 6  Modified [place] Scale: 5 - Severe disability.  Bedridden, incontinent, and requiring constant nursing care and attention.    Yuly Mike PTA  10/12/2019

## 2019-10-12 NOTE — PROGRESS NOTES
Pharmacist TPN Progress Note    Patient: Anuj Escobedo  MRN#: [id number]    Admission Date: 101019    Subjective/Objective  Severely malnourished 89 yom with peg-placement  surgery scheduled for Monday 10/14. Pt has dysphagia, unintentional weight loss,and recent CVA.      Due to high risk of refeeding syndrome will continue patient on Clinimix 5/15 1 liter only (Amino acids 50 grams and 150 grams dextrose).     Additives:    Potassium acetate 30 meq  Potassium Phos 15 mmoles.    Addendum from yesterday: did not add trace due to elevated bilirubin. Will not add trace today either. Depending on labs in Am, might consider initiating fats.     TPN labs on order for AM.    Francisca BrianD, BCPS

## 2019-10-12 NOTE — PLAN OF CARE
Problem: Patient Care Overview  Goal: Plan of Care Review  Outcome: Ongoing (interventions implemented as appropriate)      Problem: Dysphagia (Adult)  Goal: Identify Related Risk Factors and Signs and Symptoms  Outcome: Ongoing (interventions implemented as appropriate)      Problem: Fall Risk (Adult)  Goal: Identify Related Risk Factors and Signs and Symptoms  Outcome: Ongoing (interventions implemented as appropriate)    Goal: Absence of Fall  Outcome: Ongoing (interventions implemented as appropriate)

## 2019-10-12 NOTE — PLAN OF CARE
Problem: Patient Care Overview  Goal: Plan of Care Review   10/12/19 7510   Coping/Psychosocial   Plan of Care Reviewed With patient;other (see comments)   OTHER   Outcome Summary Limited re-evaluation completed due to poor alertness & severity of dysphagia. Given dysphagia extent accounting for pt's current admission, recommend continue NPO with VFSS following planned PEG on 10/14. Will f/u Monday & resume ongoing re-evaluation and establish plan for VFSS completion as appropriate. Pt educated on recommendations however cannot confirm comprehension 2/2 drowsiness. RN expressed understanding.

## 2019-10-12 NOTE — PROGRESS NOTES
[facility] Hospital Medicine Services Daily Progress Note        Hospitalist Team  LOS 1 days      Patient Care Team:  Devon Arriaza Jr., MD as PCP - General    Assessment / Plan  Dysphagia  - Multiple failed attempts at Dobbhoff tube insertion  - Failed to place NG tube in IR despite multiple attempts  - GI planning for PEG tube placement on Monday  - TPN to be given in the interim    Acute kidney injury  - Improving slowly with hydration    Chronic hypotension  - Systolic averages 100 210  - Continue to monitor BP    CAD s/p CABG  - Followed outpatient by [facility] cardiology  - Continue atorvastatin    Chronic atrial fibrillation  - On rate controlled  - Anticoagulation with Eliquis, currently on hold  - Lovenox ordered [place]  Hyperlipidemia continue atorvastatin    Oral thrush/esophageal candidiasis  - Continue fluconazole  - Continue Liliana's Magic mouthwash    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  Patient's son at bedside, case discussed and questions answered.    Brief Synopsis of Hospital Course/HPI  This is a 89-year-old  male with a past medical history of CVA, hyperlipidemia, CAD status post CABG, thrush, vitamin D deficiency, chronic atrial fibrillation, hypertension, and MI who presented to [facility] on 10/10/2019 with complaints of dysphagia.  Per the wife the patient had a stroke on 9/14/2019 and was admitted to [facility].  Patient was then discharged to [facility] and has been  there for about 3 weeks.  Patient's wife states that the patient was able to eat some at [facility], however at [facility] he has progressively gotten weaker due to not being able to swallow.  Patient has had left-sided weakness as a result of the stroke.  Per the family last week patient was given a fourth of a tablet of Prozac and went unresponsive for 48 hours at [facility].  Patient has also lost 9 pounds in 10 days due to getting no nutrition.  He was placed on IV fluids at [facility] but was taken off of them due to them causing peripheral edema.  Denies recent nausea, vomiting, diarrhea, fever, chills.     In the ED, vital signs were stable.  GI was consulted.  Dobbhoff tube was ordered.      ROS:  Review of Systems   All other systems reviewed and are negative.        Family History   Problem Relation Age of Onset   • Atrial fibrillation Sister    • Atrial fibrillation Sister        Past Medical History:   Diagnosis Date   • CAD (coronary artery disease)     s/p CABG 1993 x single vessel   • Chronic atrial fibrillation    • Dyslipidemia    • ED (erectile dysfunction)    • HTN (hypertension)    • Long term current use of anticoagulant therapy    • Myocardial infarction (CMS/HCC)    • Shingles 09/2017   • TIA (transient ischemic attack) 2015       Social History     Socioeconomic History   • Marital status:      Spouse name: Not on file   • Number of children: Not on file   • Years of education: Not on file   • Highest education level: Not on file   Tobacco Use   • Smoking status: Former Smoker     Packs/day: 1.00     Types: Cigarettes   Substance and Sexual Activity   • Alcohol use: No           Objective      Vital Signs  Temp:  [97.4 °F (36.3 °C)-97.9 °F (36.6 °C)] 97.4 °F (36.3 °C)  Heart Rate:  [67-76] 72  Resp:  [14-18] 18  BP: (109-149)/(57-89) 122/77  Oxygen Therapy  SpO2: 95 %  Pulse Oximetry Type: Intermittent  Device (Oxygen Therapy): room air  Flowsheet Rows      First Filed Value   Admission Height   "177.8 cm (70\") Documented at 10/10/2019 1145   Admission Weight  70.8 kg (156 lb) Documented at 10/10/2019 1145        Intake & Output (last 3 days)       10/09 0701 - 10/10 0700 10/10 0701 - 10/11 0700 10/11 0701 - 10/12 0700    Urine (mL/kg/hr)  200     Total Output  200     Net  -200            Urine Unmeasured Occurrence  1 x 1 x        Lines, Drains & Airways    Active LDAs     Name:   Placement date:   Placement time:   Site:   Days:    PICC Double Lumen 10/11/19 Right Basilic   10/11/19    1945    Basilic   less than 1    Peripheral IV 10/06/19 2030 Right Forearm   10/06/19    2030    Forearm   5                  Physical Exam:  General: well-developed and well-nourished, NAD  HEENT: NC/AT, EOMI, PERRLA  Heart: RRR. No murmur   Chest: CTAB, no w/r/r, normal respiratory effort  Abdominal: Soft. NT/ND. Bowel sounds present  Musculoskeletal: Normal ROM.  No edema. No calf tenderness.  Neurological: AAOx3, no focal deficits  Skin: Skin is warm and dry. No rash  Psychiatric: Normal mood and affect.      Procedures:  Procedure(s):  ESOPHAGOGASTRODUODENOSCOPY WITH PERCUTANEOUS ENDOSCOPIC GASTROSTOMY TUBE INSERTION    Procedure(s):  ESOPHAGOGASTRODUODENOSCOPY WITH PERCUTANEOUS ENDOSCOPIC GASTROSTOMY TUBE INSERTION  -------------------       Results Review:     I reviewed the patient's new clinical results.    Results from last 7 days   Lab Units 10/11/19  0732 10/10/19  1440 10/09/19  1432 10/08/19  0450 10/07/19  0445   SODIUM mmol/L 151* 148* 145* 146* 145*   POTASSIUM mmol/L 3.9 4.6 4.6 4.4 4.2   CHLORIDE mmol/L 114* 110 110 109 110   CO2 mmol/L 27.0 30.0 22.0 26.0 28.0   BUN mg/dL 32* 29* 29* 23* 23*   CREATININE mg/dL 1.20 1.30* 1.40* 1.20 1.20   GLUCOSE mg/dL 79 109* 126* 98 125*   ALBUMIN g/dL  --  3.10*  --   --   --    BILIRUBIN mg/dL  --  2.6*  --   --   --    ALK PHOS U/L  --  81  --   --   --    AST (SGOT) U/L  --  31  --   --   --    ALT (SGPT) U/L  --  25  --   --   --      Cr Clearance Estimated " Creatinine Clearance: 37.3 mL/min (by C-G formula based on SCr of 1.2 mg/dL).    Coag   Results from last 7 days   Lab Units 10/10/19  1505   INR  1.45*       HbA1C No results found for: HGBA1C  Blood Glucose       UA          Microbiology       Invalid input(s): PROCALCITONIN    ABG        EKG  ECG/EMG Results (most recent)     None          Imaging:  Ct Head Without Contrast    Result Date: 10/10/2019   1. Advanced cerebral atrophy. 2. Chronic ischemic changes. 3. No acute findings.  Electronically Signed By-Juancho Parham On:10/10/2019 4:38 PM This report was finalized on [id number] by  Juancho Parham .    Fl Gi Tube Placement    Result Date: 10/11/2019  Nasogastric tube placement under fluoroscopy unsuccessful despite multiple attempts.  Electronically Signed By-Rodolfo Pearce On:10/11/2019 3:10 PM This report was finalized on [id number] by  Gail Freeman    Xr Chest 1 View    Result Date: 10/11/2019  1. The PICC line tip is in the SVC. The distal end may be directed anteriorly or posteriorly within the cava. It does not appear to be significantly folded or looped. 2. Cardiomegaly. No evidence of active lung disease.  Electronically Signed By-Nya Baez On:10/11/2019 8:30 PM This report was finalized on [id number] by  Nya Baez .    Xr Chest Pa & Lateral    Result Date: 10/10/2019  1. Mild left basal airspace opacity could be due to pneumonia or aspiration. 2. Cardiomegaly. 3. Aortic calcification and tortuosity. Aortic dilatation is not excluded.  Electronically Signed ByMatt Baez On:10/10/2019 10:09 PM This report was finalized on [id number] by  Nya Baez .           Xrays, labs reviewed personally by physician.    Medication Review:   I have reviewed the patient's current medication list    Scheduled Meds    atorvastatin 5 mg Oral Nightly   [START ON 10/12/2019] BENEPROTEIN 1 packet Nasogastric Daily   cholecalciferol 1,000 Units Oral Daily   enoxaparin 70 mg Subcutaneous Q12H    fluconazole 100 mg Oral Daily   hydrocortisone-diphenhydramine-nystatin 15 mL Swish & Spit 4x Daily   pantoprazole 40 mg Intravenous Q AM   sertraline 25 mg Oral Nightly   sodium chloride 10 mL Intravenous Q12H   sodium chloride 10 mL Intravenous Q12H   sodium chloride 10 mL Intravenous Q12H   tamsulosin 0.4 mg Oral Nightly       Meds Infusions    Adult Central Clinimix TPN  Last Rate: 43 mL/hr at 10/11/19 2027   Pharmacy Consult - Pharmacy to dose     Pharmacy to Dose enoxaparin (LOVENOX)     Pharmacy to Dose TPN         Meds PRN  aluminum-magnesium hydroxide-simethicone  •  baclofen  •  bisacodyl  •  hydrALAZINE  •  influenza vaccine  •  magnesium hydroxide  •  melatonin  •  ondansetron **OR** ondansetron  •  Pharmacy Consult - Pharmacy to dose  •  Pharmacy to Dose enoxaparin (LOVENOX)  •  Pharmacy to Dose TPN  •  potassium chloride **OR** potassium chloride **OR** potassium chloride  •  sodium chloride  •  sodium chloride  •  sodium chloride  •  sodium chloride      Hospital problem list:    Dysphagia    Atrial fibrillation, chronic    Dyslipidemia    Degenerative arthritis    Coronary artery disease    Benign prostatic hyperplasia    Hypertension    Long term current use of anticoagulant therapy    Primary malignant neoplasm of head (CMS/HCC)    Oropharyngeal dysphagia    Abrasions of multiple sites        Sarah Guajardo MD  10/11/19  11:30 PM

## 2019-10-13 LAB
ALBUMIN SERPL-MCNC: 2.5 G/DL (ref 3.5–4.8)
ALBUMIN/GLOB SERPL: 0.7 G/DL (ref 1–1.7)
ALP SERPL-CCNC: 74 U/L (ref 32–91)
ALT SERPL W P-5'-P-CCNC: 23 U/L (ref 17–63)
ANION GAP SERPL CALCULATED.3IONS-SCNC: 10.6 MMOL/L (ref 5–15)
AST SERPL-CCNC: 30 U/L (ref 15–41)
BILIRUB SERPL-MCNC: 2.5 MG/DL (ref 0.3–1.2)
BUN BLD-MCNC: 25 MG/DL (ref 8–20)
BUN/CREAT SERPL: 25 (ref 6.2–20.3)
CA-I SERPL ISE-MCNC: 1.3 MMOL/L (ref 1.2–1.3)
CALCIUM SPEC-SCNC: 9.1 MG/DL (ref 8.9–10.3)
CHLORIDE SERPL-SCNC: 112 MMOL/L (ref 101–111)
CO2 SERPL-SCNC: 30 MMOL/L (ref 22–32)
CREAT BLD-MCNC: 1 MG/DL (ref 0.7–1.2)
GFR SERPL CREATININE-BSD FRML MDRD: 70 ML/MIN/1.73
GLOBULIN UR ELPH-MCNC: 3.5 GM/DL (ref 2.5–3.8)
GLUCOSE BLD-MCNC: 126 MG/DL (ref 65–99)
GLUCOSE BLDC GLUCOMTR-MCNC: 102 MG/DL (ref 70–105)
GLUCOSE BLDC GLUCOMTR-MCNC: 122 MG/DL (ref 70–105)
GLUCOSE BLDC GLUCOMTR-MCNC: 126 MG/DL (ref 70–105)
MAGNESIUM SERPL-MCNC: 2.1 MG/DL (ref 1.8–2.5)
PHOSPHATE SERPL-MCNC: 2.2 MG/DL (ref 2.4–4.7)
POTASSIUM BLD-SCNC: 3.6 MMOL/L (ref 3.6–5.1)
PREALB SERPL-MCNC: 6.7 MG/DL (ref 16–38)
PROT SERPL-MCNC: 6 G/DL (ref 6.1–7.9)
SODIUM BLD-SCNC: 149 MMOL/L (ref 136–144)
TRIGL SERPL-MCNC: 45 MG/DL

## 2019-10-13 PROCEDURE — 82330 ASSAY OF CALCIUM: CPT | Performed by: INTERNAL MEDICINE

## 2019-10-13 PROCEDURE — 99232 SBSQ HOSP IP/OBS MODERATE 35: CPT | Performed by: INTERNAL MEDICINE

## 2019-10-13 PROCEDURE — 80053 COMPREHEN METABOLIC PANEL: CPT | Performed by: INTERNAL MEDICINE

## 2019-10-13 PROCEDURE — 25010000002 FLUCONAZOLE PER 200 MG: Performed by: INTERNAL MEDICINE

## 2019-10-13 PROCEDURE — 25010000002 ENOXAPARIN PER 10 MG: Performed by: NURSE PRACTITIONER

## 2019-10-13 PROCEDURE — 99231 SBSQ HOSP IP/OBS SF/LOW 25: CPT | Performed by: NURSE PRACTITIONER

## 2019-10-13 PROCEDURE — 84134 ASSAY OF PREALBUMIN: CPT | Performed by: INTERNAL MEDICINE

## 2019-10-13 PROCEDURE — 84100 ASSAY OF PHOSPHORUS: CPT | Performed by: DIETITIAN, REGISTERED

## 2019-10-13 PROCEDURE — 83735 ASSAY OF MAGNESIUM: CPT | Performed by: DIETITIAN, REGISTERED

## 2019-10-13 PROCEDURE — 82962 GLUCOSE BLOOD TEST: CPT

## 2019-10-13 PROCEDURE — 84478 ASSAY OF TRIGLYCERIDES: CPT | Performed by: INTERNAL MEDICINE

## 2019-10-13 PROCEDURE — 25010000002 HYDRALAZINE PER 20 MG: Performed by: NURSE PRACTITIONER

## 2019-10-13 RX ADMIN — HYDRALAZINE HYDROCHLORIDE 10 MG: 20 INJECTION INTRAMUSCULAR; INTRAVENOUS at 04:19

## 2019-10-13 RX ADMIN — ENOXAPARIN SODIUM 70 MG: 80 INJECTION SUBCUTANEOUS at 08:54

## 2019-10-13 RX ADMIN — POTASSIUM ACETATE: 3.93 INJECTION, SOLUTION, CONCENTRATE INTRAVENOUS at 18:44

## 2019-10-13 RX ADMIN — SODIUM CHLORIDE 10 MMOL: 900 INJECTION, SOLUTION INTRAVENOUS at 10:43

## 2019-10-13 RX ADMIN — ENOXAPARIN SODIUM 70 MG: 80 INJECTION SUBCUTANEOUS at 20:35

## 2019-10-13 RX ADMIN — PANTOPRAZOLE SODIUM 40 MG: 40 INJECTION, POWDER, FOR SOLUTION INTRAVENOUS at 06:09

## 2019-10-13 RX ADMIN — FLUCONAZOLE, SODIUM CHLORIDE 200 MG: 2 INJECTION INTRAVENOUS at 10:43

## 2019-10-13 RX ADMIN — NYSTATIN 15 ML: 100000 SUSPENSION ORAL at 08:55

## 2019-10-13 RX ADMIN — NYSTATIN 15 ML: 100000 SUSPENSION ORAL at 18:44

## 2019-10-13 RX ADMIN — NYSTATIN 15 ML: 100000 SUSPENSION ORAL at 20:35

## 2019-10-13 RX ADMIN — I.V. FAT EMULSION 50 G: 20 EMULSION INTRAVENOUS at 19:44

## 2019-10-13 RX ADMIN — Medication 10 ML: at 08:55

## 2019-10-13 RX ADMIN — NYSTATIN 15 ML: 100000 SUSPENSION ORAL at 12:47

## 2019-10-13 RX ADMIN — Medication 10 ML: at 20:35

## 2019-10-13 NOTE — PLAN OF CARE
Problem: Patient Care Overview  Goal: Individualization and Mutuality  Outcome: Ongoing (interventions implemented as appropriate)    Goal: Interprofessional Rounds/Family Conf  Outcome: Ongoing (interventions implemented as appropriate)      Problem: Skin Injury Risk (Adult)  Goal: Identify Related Risk Factors and Signs and Symptoms  Outcome: Ongoing (interventions implemented as appropriate)      Problem: Dysphagia (Adult)  Goal: Identify Related Risk Factors and Signs and Symptoms  Outcome: Ongoing (interventions implemented as appropriate)    Goal: Functional/Safe Swallow  Outcome: Ongoing (interventions implemented as appropriate)    Goal: Compensatory Techniques to Improve Safety/Function with Swallowing  Outcome: Ongoing (interventions implemented as appropriate)

## 2019-10-13 NOTE — PLAN OF CARE
Problem: Patient Care Overview  Goal: Plan of Care Review  Outcome: Ongoing (interventions implemented as appropriate)      Problem: Fall Risk (Adult)  Goal: Identify Related Risk Factors and Signs and Symptoms  Outcome: Outcome(s) achieved Date Met: 10/13/19    Goal: Absence of Fall  Outcome: Ongoing (interventions implemented as appropriate)

## 2019-10-13 NOTE — PROGRESS NOTES
Pharmacist TPN Progress Note    Patient: Anuj Escobedo  MRN#: [id number]    Admission Date: 101019    Subjective/Objective  Severely malnourished 89 yom with peg-placement  surgery scheduled for Monday 10/14. Pt has dysphagia, unintentional weight loss,and recent CVA.      Spoke with dietician and will increase Clinimix to 1560 mls today along with 250 mls of fats 20%. Goal Clinimix 2160 mls per day with fats 20% 250 mls 3 times weekly.    Additives:    Potassium acetate 30 meq  Potassium Phos 18 mmoles.    Will give one run of K phos 10 mmoles today.    TPN labs on order for AM.    Francisca Staples PharmD, BCPS

## 2019-10-13 NOTE — PROGRESS NOTES
LOS: 3 days   Patient Care Team:  Devon Arriaza Jr., MD as PCP - General      Subjective  Still complains of dysphasia.  No nausea, vomiting, or abdominal pain.    Interval History: No overnight events.    ROS:   No chest pain, shortness of breath, or cough.        Medication Review:     Current Facility-Administered Medications:   •  Adult Central Clinimix TPN, , Intravenous, Q24H (TPN), Kamran Maldonado MD, Last Rate: 43 mL/hr at 10/12/19 1815  •  Adult Central Clinimix TPN, , Intravenous, Q24H (TPN), Kamran Maldonado MD  •  bisacodyl (DULCOLAX) suppository 10 mg, 10 mg, Rectal, Daily PRN, Marisela Kaufman APRN  •  enoxaparin (LOVENOX) syringe 70 mg, 70 mg, Subcutaneous, Q12H, Lashay Snyder, APRN, 70 mg at 10/13/19 0854  •  Fat Emulsion Plant Based (INTRALIPID,LIPOSYN) 20 % infusion 50 g, 250 mL, Intravenous, Once per day on Sun Tue Thu, Kamran Maldonado MD  •  fluconazole (DIFLUCAN) IVPB 200 mg, 200 mg, Intravenous, Daily, Sarah Guajardo MD, 200 mg at 10/13/19 1043  •  hydrALAZINE (APRESOLINE) injection 10 mg, 10 mg, Intravenous, Q6H PRN, Marisela Kaufman APRN, 10 mg at 10/13/19 0419  •  hydrocortisone-diphenhydramine-nystatin (MAGIC MOUTHWASH) suspension 15 mL, 15 mL, Swish & Spit, 4x Daily, Marisela Kaufman APRN, 15 mL at 10/13/19 1247  •  influenza vac split quad (FLUZONE,FLUARIX,AFLURIA) injection 0.5 mL, 0.5 mL, Intramuscular, During Hospitalization, Sarah Guajardo MD  •  Methocarbamol (ROBAXIN) injection 500 mg, 500 mg, Intravenous, Nightly PRN, Sarah Guajardo MD  •  [DISCONTINUED] ondansetron (ZOFRAN) tablet 4 mg, 4 mg, Oral, Q6H PRN **OR** ondansetron (ZOFRAN) injection 4 mg, 4 mg, Intravenous, Q6H PRN, Marisela Kaufman APRN  •  pantoprazole (PROTONIX) injection 40 mg, 40 mg, Intravenous, Q AM, Marisela Kaufman APRN, 40 mg at 10/13/19 0609  •  Pharmacy Consult - Pharmacy to dose, , Does not apply, Continuous PRN, Lashay Snyder, ESPERANZA  •   Pharmacy to Dose enoxaparin (LOVENOX), , Does not apply, Continuous PRN, Marisela Kaufman, APRN  •  Pharmacy to Dose TPN, , Does not apply, Continuous PRN, Annette Phan, APRN  •  [DISCONTINUED] potassium chloride (K-DUR,KLOR-CON) CR tablet 40 mEq, 40 mEq, Oral, PRN **OR** [DISCONTINUED] potassium chloride (KLOR-CON) packet 40 mEq, 40 mEq, Oral, PRN **OR** potassium chloride 10 mEq in 100 mL IVPB, 10 mEq, Intravenous, Q1H PRN, Marisela Kaufman, APRN, Last Rate: 100 mL/hr at 10/12/19 0549, 10 mEq at 10/12/19 0549  •  sodium chloride 0.9 % flush 10 mL, 10 mL, Intravenous, PRN, Cheyenne Kaufmana RADAMES, APRN  •  sodium chloride 0.9 % flush 10 mL, 10 mL, Intravenous, Q12H, Annette Phan, APRN, 10 mL at 10/13/19 0855  •  sodium chloride 0.9 % flush 10 mL, 10 mL, Intravenous, PRN, Annette Phan, APRN  •  sodium chloride 0.9 % flush 20 mL, 20 mL, Intravenous, PRN, Annette Phan, APRN      Objective     Vital Signs  Vitals:    10/13/19 0407 10/13/19 0414 10/13/19 0545 10/13/19 1200   BP: (!) 191/75 161/61 126/72    BP Location: Right arm Right leg Right leg    Patient Position: Lying      Pulse: 63   64   Resp: 16   15   Temp: 97.3 °F (36.3 °C)   97.3 °F (36.3 °C)   TempSrc: Oral   Oral   SpO2: 97%   97%   Weight: 67.1 kg (147 lb 14.9 oz)      Height:           Physical Exam:    General Appearance:    Awake and alert, in no acute distress, hard of hearing, thin habitus, family at bedside   Head:    Normocephalic, without obvious abnormality   Eyes:          Conjunctivae normal, anicteric sclera   Ears:    Hearing intact   Throat:   No oral lesions, no thrush, oral mucosa moist   Neck:   No adenopathy, supple, no JVD   Lungs:     Clear to auscultation bilaterally, respirations regular, even and unlabored    Heart:   Irregularly irregular rhythm and normal rate, normal S1 and S2, no            Murmurs appreciated   Abdomen:     Normal bowel sounds, soft, non-tender, no rebound or guarding, non-distended, no  hepatosplenomegaly   Rectal:     Deferred   Extremities:   No edema, no cyanosis   Skin:   No rash, no jaundice, scattered ecchymosis present   Neurologic:  Left-sided weakness noted        Results Review:    Lab Results (last 24 hours)     Procedure Component Value Units Date/Time    POC Glucose Once [[id number]]  (Abnormal) Collected:  10/13/19 1204    Specimen:  Blood Updated:  10/13/19 1206     Glucose 126 mg/dL      Comment: Serial Number: [id number]:  [id number]       POC Glucose Once [[id number]]  (Abnormal) Collected:  10/13/19 0656    Specimen:  Blood Updated:  10/13/19 0657     Glucose 122 mg/dL      Comment: Serial Number: [id number]:  [id number]       Magnesium [[id number]]  (Normal) Collected:  10/13/19 0353    Specimen:  Blood Updated:  10/13/19 0546     Magnesium 2.1 mg/dL     Phosphorus [[id number]]  (Abnormal) Collected:  10/13/19 0353    Specimen:  Blood Updated:  10/13/19 0546     Phosphorus 2.2 mg/dL     Comprehensive Metabolic Panel [[id number]]  (Abnormal) Collected:  10/13/19 0353    Specimen:  Blood Updated:  10/13/19 0546     Glucose 126 mg/dL      BUN 25 mg/dL      Creatinine 1.00 mg/dL      Sodium 149 mmol/L      Potassium 3.6 mmol/L      Chloride 112 mmol/L      CO2 30.0 mmol/L      Calcium 9.1 mg/dL      Total Protein 6.0 g/dL      Albumin 2.50 g/dL      ALT (SGPT) 23 U/L      AST (SGOT) 30 U/L      Alkaline Phosphatase 74 U/L      Total Bilirubin 2.5 mg/dL      eGFR Non African Amer 70 mL/min/1.73      Globulin 3.5 gm/dL      A/G Ratio 0.7 g/dL      BUN/Creatinine Ratio 25.0     Anion Gap 10.6 mmol/L     Narrative:       The MDRD GFR formula is only valid for adults with stable renal function between ages 18 and 70.    Prealbumin [[id number]]  (Abnormal) Collected:  10/13/19 0353    Specimen:  Blood Updated:  10/13/19 0546     Prealbumin 6.7 mg/dL     Triglycerides [[id number]]  (Normal) Collected:  10/13/19 0353    Specimen:  Blood Updated:  10/13/19 0546     Triglycerides 45  mg/dL     Calcium, Ionized [[id number]]  (Normal) Collected:  10/13/19 0353    Specimen:  Blood Updated:  10/13/19 0524     Ionized Calcium 1.30 mmol/L     POC Glucose Once [[id number]]  (Normal) Collected:  10/13/19 0007    Specimen:  Blood Updated:  10/13/19 0008     Glucose 102 mg/dL      Comment: Serial Number: [id number]:  [id number]             Imaging Results (last 24 hours)     ** No results found for the last 24 hours. **            Assessment/Plan     Assessment:  Evaluation for PEG placement  Dysphasia  Unintentional weight loss  Recent CVA-on Eliquis  Atrial fibrillation  Coronary artery disease status post CABG  Hypertension  AMANDA  Oral thrush  Weight loss   Hx of cholecystectomy     Plan:  PICC has been inserted and patient is receiving TPN for nutrition since Dobbhoff insertion was unsuccessful by bedside nurses and by radiology.  Patient's family declined endoscopic insertion of Dobbhoff 10/11/19.  Eliquis remains on hold.  Pharmacy is dosing lovenox bid that will be held after evening dose 10/13/19.  Tentatively planning EGD with PEG 10/14/19.  Maintain n.p.o. after midnight.  Cefazolin has been ordered for tomorrow at time of PEG insertion.  Patient is receiving IV fluconazole for oral thrush/possible esophageal candidiasis.  Supportive care.     Lashay Snyder, APRN  10/13/19  3:44 PM

## 2019-10-13 NOTE — PROGRESS NOTES
[facility] Medicine Services Daily Progress Note        Hospitalist Team  LOS 2 days      Patient Care Team:  Devon Arriaza Jr., MD as PCP - General    Assessment / Plan    Dysphagia  - Multiple failed attempts at Dobbhoff tube insertion  - Failed to place NG tube in IR despite multiple attempts  - GI planning for PEG tube placement on Monday  - TPN to be given in the interim     Acute kidney injury  - Improving slowly with hydration     Chronic hypotension  - Systolic averages 100 210  - Continue to monitor BP     CAD s/p CABG  - Followed outpatient by [facility] cardiology  - Continue atorvastatin     Chronic atrial fibrillation  - On rate controlled  - Anticoagulation with Eliquis, currently on hold  - Lovenox ordered [place]  Hyperlipidemia continue atorvastatin     Oral thrush/esophageal candidiasis  - Continue fluconazole  - Continue Liliana's Magic mouthwash     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  Multiple unsuccessful attempts at NG tube placement, no way to give patient oral meds, so they will be held with some being converted to IV medications where possible. Patient's son at bedside, case discussed and questions answered.     Brief Synopsis of Hospital Course/HPI  This is a 89-year-old  male with a past medical history of CVA, hyperlipidemia, CAD status post CABG, thrush, vitamin D deficiency, chronic atrial fibrillation, hypertension, and MI who presented to [facility] on 10/10/2019 with complaints of dysphagia.  Per the wife the patient had a stroke on 9/14/2019 and was admitted to [facility].  Patient was then discharged to [facility] and has been there for about 3 weeks.  Patient's wife states that the patient was able to eat some at [facility], however at [facility] he has progressively gotten weaker due to not being able to swallow.  Patient has had left-sided weakness as a result of the stroke.  Per the family last week patient was given a fourth of a tablet of Prozac and went unresponsive for 48 hours at [facility].  Patient has also lost 9 pounds in 10 days due to getting no nutrition.  He was placed on IV fluids at [facility] but was taken off of them due to them causing peripheral edema.  Denies recent nausea, vomiting, diarrhea, fever, chills.     In the ED, vital signs were stable.  GI was consulted.  Dobbhoff tube was ordered.      ROS:  Review of Systems   All other systems reviewed and are negative.        Family History   Problem Relation Age of Onset   • Atrial fibrillation Sister    • Atrial fibrillation Sister        Past Medical History:   Diagnosis Date   • CAD (coronary artery disease)     s/p CABG 1993 x single vessel   • Chronic atrial fibrillation    • Dyslipidemia    • ED (erectile dysfunction)    • HTN (hypertension)    • Long term current use of anticoagulant therapy    • Myocardial infarction (CMS/HCC)    • Shingles 09/2017   • TIA (transient ischemic attack) 2015       Social History     Socioeconomic History   • Marital status:      Spouse name: Not on file   • Number of children: Not on file   • Years of education: Not on file   • Highest education level: Not on file   Tobacco Use   • Smoking status: Former Smoker     Packs/day: 1.00     Types: Cigarettes   Substance and Sexual Activity   • Alcohol use: No           Objective      Vital Signs  Temp:  [97.4 °F (36.3 °C)] 97.4 °F (36.3 °C)  Heart Rate:  [57-72] 57  Resp:  [15-18] 16  BP: (122-141)/(77)  "141/77  Oxygen Therapy  SpO2: 96 %  Pulse Oximetry Type: Intermittent  Device (Oxygen Therapy): room air  Flowsheet Rows      First Filed Value   Admission Height  177.8 cm (70\") Documented at 10/10/2019 1145   Admission Weight  70.8 kg (156 lb) Documented at 10/10/2019 1145        Intake & Output (last 3 days)       10/10 0701 - 10/11 0700 10/11 0701 - 10/12 0700 10/12 0701 - 10/13 0700    P.O.  0     TPN  300     Total Intake(mL/kg)  300 (4.2)     Urine (mL/kg/hr) 200      Total Output 200      Net -200 +300            Urine Unmeasured Occurrence 1 x 2 x 2 x        Lines, Drains & Airways    Active LDAs     Name:   Placement date:   Placement time:   Site:   Days:    PICC Double Lumen 10/11/19 Right Basilic   10/11/19    1945    Basilic   1    Peripheral IV 10/06/19 2030 Right Forearm   10/06/19    2030    Forearm   5                  Physical Exam:  General: well-developed and well-nourished, NAD  HEENT: NC/AT, EOMI, PERRLA  Heart: RRR. No murmur   Chest: CTAB, no w/r/r, normal respiratory effort  Abdominal: Soft. NT/ND. Bowel sounds present  Musculoskeletal: Normal ROM.  No edema. No calf tenderness.  Neurological: AAOx3, no focal deficits  Skin: Skin is warm and dry. No rash  Psychiatric: Normal mood and affect.      Procedures:  Procedure(s):  ESOPHAGOGASTRODUODENOSCOPY WITH PERCUTANEOUS ENDOSCOPIC GASTROSTOMY TUBE INSERTION    Procedure(s):  ESOPHAGOGASTRODUODENOSCOPY WITH PERCUTANEOUS ENDOSCOPIC GASTROSTOMY TUBE INSERTION  -------------------       Results Review:     I reviewed the patient's new clinical results.    Results from last 7 days   Lab Units 10/12/19  0325 10/11/19  0732 10/10/19  1440 10/09/19  1432 10/08/19  0450 10/07/19  0445   SODIUM mmol/L 153* 151* 148* 145* 146* 145*   POTASSIUM mmol/L 3.5* 3.9 4.6 4.6 4.4 4.2   CHLORIDE mmol/L 116* 114* 110 110 109 110   CO2 mmol/L 31.0 27.0 30.0 22.0 26.0 28.0   BUN mg/dL 32* 32* 29* 29* 23* 23*   CREATININE mg/dL 1.10 1.20 1.30* 1.40* 1.20 1.20 "   GLUCOSE mg/dL 113* 79 109* 126* 98 125*   ALBUMIN g/dL 2.30*  --  3.10*  --   --   --    BILIRUBIN mg/dL 1.8*  --  2.6*  --   --   --    ALK PHOS U/L 67  --  81  --   --   --    AST (SGOT) U/L 28  --  31  --   --   --    ALT (SGPT) U/L 18  --  25  --   --   --      Cr Clearance Estimated Creatinine Clearance: 41.5 mL/min (by C-G formula based on SCr of 1.1 mg/dL).    Coag   Results from last 7 days   Lab Units 10/10/19  1505   INR  1.45*       HbA1C No results found for: HGBA1C  Blood Glucose   Results from last 7 days   Lab Units 10/12/19  0613   GLUCOSE mg/dL 108*       UA          Microbiology       Invalid input(s): PROCALCITONIN    ABG        EKG  ECG/EMG Results (most recent)     None          Imaging:  Fl Gi Tube Placement    Result Date: 10/11/2019  Nasogastric tube placement under fluoroscopy unsuccessful despite multiple attempts.  Electronically Signed By-Rodolfo Pearce On:10/11/2019 3:10 PM This report was finalized on [id number] by  Rodolfo Pearce .    Xr Chest 1 View    Result Date: 10/11/2019  1. The PICC line tip is in the SVC. The distal end may be directed anteriorly or posteriorly within the cava. It does not appear to be significantly folded or looped. 2. Cardiomegaly. No evidence of active lung disease.  Electronically Signed By-Nya Baez On:10/11/2019 8:30 PM This report was finalized on [id number] by  Nya Baez .           Xrays, labs reviewed personally by physician.    Medication Review:   I have reviewed the patient's current medication list    Scheduled Meds    enoxaparin 70 mg Subcutaneous Q12H   fluconazole 200 mg Intravenous Daily   hydrocortisone-diphenhydramine-nystatin 15 mL Swish & Spit 4x Daily   pantoprazole 40 mg Intravenous Q AM   sodium chloride 10 mL Intravenous Q12H       Meds Infusions    Adult Central Clinimix TPN  Last Rate: 43 mL/hr at 10/12/19 1811   Pharmacy Consult - Pharmacy to dose     Pharmacy to Dose enoxaparin (LOVENOX)     Pharmacy to Dose TPN          Meds PRN  bisacodyl  •  hydrALAZINE  •  influenza vaccine  •  Methocarbamol  •  [DISCONTINUED] ondansetron **OR** ondansetron  •  Pharmacy Consult - Pharmacy to dose  •  Pharmacy to Dose enoxaparin (LOVENOX)  •  Pharmacy to Dose TPN  •  [DISCONTINUED] potassium chloride **OR** [DISCONTINUED] potassium chloride **OR** potassium chloride  •  sodium chloride  •  sodium chloride  •  sodium chloride      Hospital problem list:    Dysphagia    Atrial fibrillation, chronic    Dyslipidemia    Degenerative arthritis    Coronary artery disease    Benign prostatic hyperplasia    Hypertension    Long term current use of anticoagulant therapy    Primary malignant neoplasm of head (CMS/HCC)    Oropharyngeal dysphagia    Abrasions of multiple sites        Sarah Guajardo MD  10/12/19  8:08 PM

## 2019-10-13 NOTE — PROGRESS NOTES
Nutrition Services    Patient Name:  Anuj Escobedo  YOB: 1925  MRN: [id number]  Admit Date:  10/10/2019    TPN check on. Spoke with [facility] Francisca and reviewed labs.    Labs: Gluc 126, Na 149 H (decreased), K 3.6, BUN 25 H, Crt 1.0, Ca 9.1, Alb 2.5 L, Ph 2.2 L, TG 45, Mg 2.1, PAB 6.7 L. RPh confirm phos will be replaced in TPN.    Advancing TPN towards end-goal rate. Spoke with [facility] and plan to advance to Clinimix 5/15 1560 mL volume to provide 234 g dextrose (796 kcal) and 78 g protein (312 kcal; 86% estimated needs). Initiating Lipids tomorrow as well (recommend 250 mL 20% Lipids 3x/week) = Avg 215 kcal /day. Total TPN Kcal: 1323 kcal (75% estimated needs).    RDN will continue to follow as planned.      Electronically signed by:  Merly Andersen RD  10/13/19 10:41 AM

## 2019-10-14 ENCOUNTER — INPATIENT HOSPITAL (OUTPATIENT)
Dept: URBAN - METROPOLITAN AREA HOSPITAL 84 | Facility: HOSPITAL | Age: 84
End: 2019-10-14
Payer: COMMERCIAL

## 2019-10-14 ENCOUNTER — ANESTHESIA (OUTPATIENT)
Dept: GASTROENTEROLOGY | Facility: HOSPITAL | Age: 84
End: 2019-10-14

## 2019-10-14 DIAGNOSIS — K22.4 DYSKINESIA OF ESOPHAGUS: ICD-10-CM

## 2019-10-14 DIAGNOSIS — K44.9 DIAPHRAGMATIC HERNIA WITHOUT OBSTRUCTION OR GANGRENE: ICD-10-CM

## 2019-10-14 DIAGNOSIS — R13.12 DYSPHAGIA, OROPHARYNGEAL PHASE: ICD-10-CM

## 2019-10-14 LAB
ALBUMIN SERPL-MCNC: 2.3 G/DL (ref 3.5–4.8)
ALBUMIN/GLOB SERPL: 0.7 G/DL (ref 1–1.7)
ALP SERPL-CCNC: 67 U/L (ref 32–91)
ALT SERPL W P-5'-P-CCNC: 20 U/L (ref 17–63)
ANION GAP SERPL CALCULATED.3IONS-SCNC: 8.6 MMOL/L (ref 5–15)
APTT PPP: 30.1 SECONDS (ref 24–31)
AST SERPL-CCNC: 31 U/L (ref 15–41)
BASOPHILS # BLD AUTO: 0 10*3/MM3 (ref 0–0.2)
BASOPHILS NFR BLD AUTO: 0.2 % (ref 0–1.5)
BILIRUB SERPL-MCNC: 1.9 MG/DL (ref 0.3–1.2)
BUN BLD-MCNC: 23 MG/DL (ref 8–20)
BUN/CREAT SERPL: 25.6 (ref 6.2–20.3)
CA-I SERPL ISE-MCNC: 1.3 MMOL/L (ref 1.2–1.3)
CALCIUM SPEC-SCNC: 8.9 MG/DL (ref 8.9–10.3)
CHLORIDE SERPL-SCNC: 112 MMOL/L (ref 101–111)
CO2 SERPL-SCNC: 29 MMOL/L (ref 22–32)
CREAT BLD-MCNC: 0.9 MG/DL (ref 0.7–1.2)
DEPRECATED RDW RBC AUTO: 49.4 FL (ref 37–54)
EOSINOPHIL # BLD AUTO: 0.1 10*3/MM3 (ref 0–0.4)
EOSINOPHIL NFR BLD AUTO: 1.7 % (ref 0.3–6.2)
ERYTHROCYTE [DISTWIDTH] IN BLOOD BY AUTOMATED COUNT: 15.2 % (ref 12.3–15.4)
GFR SERPL CREATININE-BSD FRML MDRD: 79 ML/MIN/1.73
GLOBULIN UR ELPH-MCNC: 3.3 GM/DL (ref 2.5–3.8)
GLUCOSE BLD-MCNC: 118 MG/DL (ref 65–99)
HCT VFR BLD AUTO: 45.2 % (ref 37.5–51)
HGB BLD-MCNC: 15.3 G/DL (ref 13–17.7)
INR PPP: 1.25 (ref 0.9–1.1)
LYMPHOCYTES # BLD AUTO: 0.8 10*3/MM3 (ref 0.7–3.1)
LYMPHOCYTES NFR BLD AUTO: 15.8 % (ref 19.6–45.3)
MAGNESIUM SERPL-MCNC: 2.1 MG/DL (ref 1.8–2.5)
MCH RBC QN AUTO: 31.4 PG (ref 26.6–33)
MCHC RBC AUTO-ENTMCNC: 33.9 G/DL (ref 31.5–35.7)
MCV RBC AUTO: 92.6 FL (ref 79–97)
MONOCYTES # BLD AUTO: 0.5 10*3/MM3 (ref 0.1–0.9)
MONOCYTES NFR BLD AUTO: 9.7 % (ref 5–12)
NEUTROPHILS # BLD AUTO: 3.9 10*3/MM3 (ref 1.7–7)
NEUTROPHILS NFR BLD AUTO: 72.6 % (ref 42.7–76)
NRBC BLD AUTO-RTO: 0.1 /100 WBC (ref 0–0.2)
PHOSPHATE SERPL-MCNC: 2.6 MG/DL (ref 2.4–4.7)
PLATELET # BLD AUTO: 159 10*3/MM3 (ref 140–450)
PMV BLD AUTO: 8.4 FL (ref 6–12)
POTASSIUM BLD-SCNC: 3.6 MMOL/L (ref 3.6–5.1)
PREALB SERPL-MCNC: 7.7 MG/DL (ref 16–38)
PROT SERPL-MCNC: 5.6 G/DL (ref 6.1–7.9)
PROTHROMBIN TIME: 12.6 SECONDS (ref 9.6–11.7)
RBC # BLD AUTO: 4.88 10*6/MM3 (ref 4.14–5.8)
SODIUM BLD-SCNC: 146 MMOL/L (ref 136–144)
WBC NRBC COR # BLD: 5.3 10*3/MM3 (ref 3.4–10.8)

## 2019-10-14 PROCEDURE — 99232 SBSQ HOSP IP/OBS MODERATE 35: CPT | Performed by: INTERNAL MEDICINE

## 2019-10-14 PROCEDURE — 97530 THERAPEUTIC ACTIVITIES: CPT

## 2019-10-14 PROCEDURE — 85025 COMPLETE CBC W/AUTO DIFF WBC: CPT | Performed by: NURSE PRACTITIONER

## 2019-10-14 PROCEDURE — 25010000002 PROPOFOL 10 MG/ML EMULSION: Performed by: ANESTHESIOLOGY

## 2019-10-14 PROCEDURE — 84100 ASSAY OF PHOSPHORUS: CPT | Performed by: DIETITIAN, REGISTERED

## 2019-10-14 PROCEDURE — 0DH63UZ INSERTION OF FEEDING DEVICE INTO STOMACH, PERCUTANEOUS APPROACH: ICD-10-PCS | Performed by: INTERNAL MEDICINE

## 2019-10-14 PROCEDURE — 85730 THROMBOPLASTIN TIME PARTIAL: CPT | Performed by: NURSE PRACTITIONER

## 2019-10-14 PROCEDURE — 83735 ASSAY OF MAGNESIUM: CPT | Performed by: DIETITIAN, REGISTERED

## 2019-10-14 PROCEDURE — 43246 EGD PLACE GASTROSTOMY TUBE: CPT | Performed by: INTERNAL MEDICINE

## 2019-10-14 PROCEDURE — 85610 PROTHROMBIN TIME: CPT | Performed by: NURSE PRACTITIONER

## 2019-10-14 PROCEDURE — 84134 ASSAY OF PREALBUMIN: CPT | Performed by: INTERNAL MEDICINE

## 2019-10-14 PROCEDURE — 80053 COMPREHEN METABOLIC PANEL: CPT | Performed by: INTERNAL MEDICINE

## 2019-10-14 PROCEDURE — 25010000002 FLUCONAZOLE PER 200 MG: Performed by: INTERNAL MEDICINE

## 2019-10-14 PROCEDURE — 97112 NEUROMUSCULAR REEDUCATION: CPT

## 2019-10-14 PROCEDURE — 82330 ASSAY OF CALCIUM: CPT | Performed by: INTERNAL MEDICINE

## 2019-10-14 RX ORDER — SODIUM CHLORIDE 0.9 % (FLUSH) 0.9 %
3 SYRINGE (ML) INJECTION EVERY 12 HOURS SCHEDULED
Status: DISCONTINUED | OUTPATIENT
Start: 2019-10-14 | End: 2019-10-14 | Stop reason: HOSPADM

## 2019-10-14 RX ORDER — SODIUM CHLORIDE 9 MG/ML
9 INJECTION, SOLUTION INTRAVENOUS CONTINUOUS PRN
Status: DISCONTINUED | OUTPATIENT
Start: 2019-10-14 | End: 2019-10-21 | Stop reason: HOSPADM

## 2019-10-14 RX ORDER — SODIUM CHLORIDE 0.9 % (FLUSH) 0.9 %
3-10 SYRINGE (ML) INJECTION AS NEEDED
Status: DISCONTINUED | OUTPATIENT
Start: 2019-10-14 | End: 2019-10-14 | Stop reason: HOSPADM

## 2019-10-14 RX ORDER — PROPOFOL 10 MG/ML
VIAL (ML) INTRAVENOUS AS NEEDED
Status: DISCONTINUED | OUTPATIENT
Start: 2019-10-14 | End: 2019-10-14 | Stop reason: SURG

## 2019-10-14 RX ORDER — POTASSIUM CHLORIDE 20 MEQ/1
20 TABLET, EXTENDED RELEASE ORAL DAILY
Status: DISCONTINUED | OUTPATIENT
Start: 2019-10-15 | End: 2019-10-15

## 2019-10-14 RX ORDER — ONDANSETRON 4 MG/1
4 TABLET, FILM COATED ORAL EVERY 6 HOURS PRN
Status: DISCONTINUED | OUTPATIENT
Start: 2019-10-14 | End: 2019-10-21 | Stop reason: HOSPADM

## 2019-10-14 RX ORDER — ONDANSETRON 2 MG/ML
4 INJECTION INTRAMUSCULAR; INTRAVENOUS EVERY 6 HOURS PRN
Status: DISCONTINUED | OUTPATIENT
Start: 2019-10-14 | End: 2019-10-21 | Stop reason: HOSPADM

## 2019-10-14 RX ORDER — TAMSULOSIN HYDROCHLORIDE 0.4 MG/1
0.4 CAPSULE ORAL NIGHTLY
Status: DISCONTINUED | OUTPATIENT
Start: 2019-10-14 | End: 2019-10-21 | Stop reason: HOSPADM

## 2019-10-14 RX ORDER — ATORVASTATIN CALCIUM 10 MG/1
5 TABLET, FILM COATED ORAL NIGHTLY
Status: DISCONTINUED | OUTPATIENT
Start: 2019-10-14 | End: 2019-10-21 | Stop reason: HOSPADM

## 2019-10-14 RX ORDER — MELATONIN
1000 DAILY
Status: DISCONTINUED | OUTPATIENT
Start: 2019-10-15 | End: 2019-10-21 | Stop reason: HOSPADM

## 2019-10-14 RX ORDER — CHOLECALCIFEROL (VITAMIN D3) 125 MCG
5 CAPSULE ORAL NIGHTLY PRN
Status: DISCONTINUED | OUTPATIENT
Start: 2019-10-14 | End: 2019-10-21 | Stop reason: HOSPADM

## 2019-10-14 RX ADMIN — Medication 10 ML: at 20:14

## 2019-10-14 RX ADMIN — NYSTATIN 15 ML: 100000 SUSPENSION ORAL at 17:04

## 2019-10-14 RX ADMIN — TAMSULOSIN HYDROCHLORIDE 0.4 MG: 0.4 CAPSULE ORAL at 20:13

## 2019-10-14 RX ADMIN — PANTOPRAZOLE SODIUM 40 MG: 40 INJECTION, POWDER, FOR SOLUTION INTRAVENOUS at 05:44

## 2019-10-14 RX ADMIN — SERTRALINE HYDROCHLORIDE 25 MG: 50 TABLET ORAL at 20:13

## 2019-10-14 RX ADMIN — FLUCONAZOLE, SODIUM CHLORIDE 200 MG: 2 INJECTION INTRAVENOUS at 10:27

## 2019-10-14 RX ADMIN — NYSTATIN 15 ML: 100000 SUSPENSION ORAL at 11:27

## 2019-10-14 RX ADMIN — SODIUM CHLORIDE 9 ML/HR: 0.9 INJECTION, SOLUTION INTRAVENOUS at 08:46

## 2019-10-14 RX ADMIN — ASCORBIC ACID, VITAMIN A PALMITATE, CHOLECALCIFEROL, THIAMINE HYDROCHLORIDE, RIBOFLAVIN-5 PHOSPHATE SODIUM, PYRIDOXINE HYDROCHLORIDE, NIACINAMIDE, DEXPANTHENOL, ALPHA-TOCOPHEROL ACETATE, VITAMIN K1, FOLIC ACID, BIOTIN, CYANOCOBALAMIN: 200; 3300; 200; 6; 3.6; 6; 40; 15; 10; 150; 600; 60; 5 INJECTION, SOLUTION INTRAVENOUS at 17:55

## 2019-10-14 RX ADMIN — ATORVASTATIN CALCIUM 5 MG: 10 TABLET, FILM COATED ORAL at 20:13

## 2019-10-14 RX ADMIN — NYSTATIN 15 ML: 100000 SUSPENSION ORAL at 20:13

## 2019-10-14 RX ADMIN — ONDANSETRON HYDROCHLORIDE 4 MG: 4 TABLET, FILM COATED ORAL at 20:14

## 2019-10-14 RX ADMIN — CEFAZOLIN SODIUM 2 G: 1 INJECTION, POWDER, FOR SOLUTION INTRAMUSCULAR; INTRAVENOUS at 09:03

## 2019-10-14 RX ADMIN — PROPOFOL 25 MG: 10 INJECTION, EMULSION INTRAVENOUS at 09:07

## 2019-10-14 RX ADMIN — PROPOFOL 50 MG: 10 INJECTION, EMULSION INTRAVENOUS at 09:05

## 2019-10-14 NOTE — PROGRESS NOTES
Pharmacist TPN Progress Note    Patient: Anuj Escobedo  MRN#: [id number]    Admission Date: 101019    Subjective/Objective  Severely malnourished 89 yom with peg-placement  surgery scheduled for Monday 10/14. Pt has dysphagia, unintentional weight loss,and recent CVA.      Spoke with dietician and Kiara Gutierrez NP regarding TPN. Will continue Clinimix 1560 mls today. Goal Clinimix 2160 mls per day with fats 20% 250 mls 3 times weekly. No fats today. Pt received fats yesterday. Will not increase to goal TPN due to tube feeds to start this evening. Will not stop TPN today due to tube feeds starting late today and will have to be tapered to goal.     Additives:    Potassium acetate 30 meq  Potassium Phos 18 mmoles  Multivitamin 10 mls    TPN labs on order for Am. Tube feeds to start this evening.     Francisca BrianD, BCPS

## 2019-10-14 NOTE — PROGRESS NOTES
Continued Stay Note  DENNIS Churchill     Patient Name: Anuj Escobedo  MRN: [id number]  Today's Date: 10/14/2019    Admit Date: 10/10/2019    Discharge Plan    Pt is a return to [facility],pending PT notes,bed availability,needs precert,no PASRR needed but completed if needed for other ecf       [facility], [facility], [facility]  [phone]

## 2019-10-14 NOTE — PLAN OF CARE
Problem: Patient Care Overview  Goal: Plan of Care Review  Outcome: Ongoing (interventions implemented as appropriate)   10/14/19 0313   OTHER   Outcome Summary pt is having EGD with possible g-tube placement today. Pt had no compaints overnight. Will continue to monitor pt.      Goal: Individualization and Mutuality  Outcome: Ongoing (interventions implemented as appropriate)    Goal: Discharge Needs Assessment  Outcome: Ongoing (interventions implemented as appropriate)    Goal: Interprofessional Rounds/Family Conf  Outcome: Ongoing (interventions implemented as appropriate)      Problem: Skin Injury Risk (Adult)  Goal: Identify Related Risk Factors and Signs and Symptoms  Outcome: Outcome(s) achieved Date Met: 10/14/19   10/14/19 0313   Skin Injury Risk (Adult)   Related Risk Factors (Skin Injury Risk) advanced age;hospitalization prolonged;mobility impaired;moisture     Goal: Skin Health and Integrity  Outcome: Ongoing (interventions implemented as appropriate)   10/14/19 0313   Skin Injury Risk (Adult)   Skin Health and Integrity making progress toward outcome       Problem: Dysphagia (Adult)  Goal: Identify Related Risk Factors and Signs and Symptoms  Outcome: Outcome(s) achieved Date Met: 10/14/19   10/14/19 0313   Dysphagia (Adult)   Related Risk Factors (Dysphagia) esophageal defects;neurological impairment   Upper Esophageal Phase Dysphagia Signs and Symptoms (Dysphagia) reports food sticking in esophagus     Goal: Functional/Safe Swallow  Outcome: Ongoing (interventions implemented as appropriate)   10/14/19 0313   Dysphagia (Adult)   Functional/Safe Swallow making progress toward outcome     Goal: Compensatory Techniques to Improve Safety/Function with Swallowing  Outcome: Ongoing (interventions implemented as appropriate)   10/14/19 0313   Dysphagia (Adult)   Compensatory Techniques to Improve Safety/Function with Swallowing making progress toward outcome       Problem: Fall Risk (Adult)  Goal: Absence  of Fall  Outcome: Ongoing (interventions implemented as appropriate)   10/14/19 0313   Fall Risk (Adult)   Absence of Fall making progress toward outcome       Problem: Nutrition, Parenteral (Adult)  Goal: Signs and Symptoms of Listed Potential Problems Will be Absent, Minimized or Managed (Nutrition, Parenteral)  Outcome: Ongoing (interventions implemented as appropriate)   10/14/19 0313   Goal/Outcome Evaluation   Problems Assessed (Parenteral Nutrition) all   Problems Present (Parenteral Nutrition) malnutrition

## 2019-10-14 NOTE — PROGRESS NOTES
"Adult Nutrition  Assessment/PES    Patient Name:  Anuj Escobedo  YOB: 1925  MRN: [id number]  Admit Date:  10/10/2019    Assessment Date:  10/14/2019    Comments:  Continuing Clinimix 5/15, 1560mL today. TPN Day #4. Starting tube feed 24hr after PEG was placed.     Initial Goal: Nutren 1.5 at 55mL/hr + 40mL water flush q1hr. Monitor for s/s of refeeding syndrome and advance to end goal as tolerated.     Reason for Assessment                Reason for Assessment    Reason For Assessment Follow up per protocol.    TF/PN       Diagnosis PMH: CVA, hyperlipidemia, CAD status post CABG, thrush, vitamin D deficiency, chronic atrial fibrillation, hypertension, and MI      Current Dx: Admit c dysphagia. Possible AMANDA. Oral Thrush. PEG placed 10/14. Multiple attempts to place DHT 10/11 but failed. MD offered TPN until PEG can be placed. TPN started 10/11         Nutrition/Diet History                Nutrition/Diet History    Typical Food/Fluid Intake 10/11: D/W family intake and stated had not been able to eat anything in the last 9-10 days but had not really been eaten well prior. Stroke 4 weeks ago. Confirmed weight loss.      Food Allergies No known food allergies.          Anthropometrics             Anthropometrics    Height  177.8 cm (70\")    Weight 69.3 kg (152 lb)   10/14/19       Admit Weight    Admit Weight  70.8 kg (156 lb)   10/10/19       Ideal Body Weight (IBW)    Ideal Body Weight (IBW) (kg)  76 kg    % Ideal Body Weight  91%        Usual Body Weight (UBW)    Usual Body Weight  81.6 kg (180 lb) - per family    % Usual Body Weight  85.8%    Weight Loss Weight loss of 10.4% in 6 months - severe    Weight History  Wt hx:   173.5 lb (6/19),  172 lb (4/19),  180 lb (3/19),  179.8 (1/19),   182.4 (12/18),   181 lb (10/18),   180 lb (9/18)       Body Mass Index (BMI)    BMI (kg/m2)  21.9        Labs/Tests/Procedures/Meds                Labs/Procedures/Meds    Lab Results Comments Na+ 146 H K+ wnl glucose " "118 BUN 23 H crt wnl Phos wnl Mg wnl        Medications    Pertinent Medications Comments Diflucan, Protonix, Liliana's magic mouthwash         Physical Findings                Physical Findings    Overall Physical Appearance 10/14: Patient continues to appear frail. NFPE completed at initial assessment.     10/11: Appears Frail, NFPE completed 10/11/19; See MSA     Gastrointestinal No BM x5 days     Tubes PEG placed 10/14     Oral/Mouth Cavity Dysphagia     Skin Left leg abrasion, right gluteal ST, Right gluteal Abrasion          Estimated/Assessed Needs              Calculation Measurements    Weight Used For Calculations 69.3 kg (152 lb)  --    Height 177.8 cm (70\") -       Estimated/Assessed Needs    Additional Documentation -  --       Calorie Requirements    Weight Used For Calorie Calculations  69.3 kg (152 lb)  --    Estimated Calorie Need Method 35 kcals/kg   --    Estimated Calorie Requirement Comment 2425 kcals  --       Protein Requirements    Weight Used For Protein Calculations 69.3 kg (152 lb)  --    Est Protein Requirement Amount (gms/kg) 1.5 gm protein  --    Estimated Protein Requirements (gms/day) 103 g  --       Fluid Requirements    Estimated Fluid Requirements (mL/day) 2425 mL (using 1mL/kcal, adjust based on hydration)  --              Nutrition Prescription Ordered                Nutrition Prescription PO    Current PO Diet  NPO        Nutrition Prescription EN    Enteral Route PEG     Product N/A - No tube feed running at this time. Patient has been on TPN until PEG could be placed. Patient to receive TPN today and then transition to tube feed     Modulars -     TF Delivery Method -     Continuous TF Goal Rate (mL/hr) -     Water flush (mL)  -     Water Flush Frequency -         Evaluation of Received Nutrient/Fluid Intake                PO Evaluation    % PO Intake 10/14: 0% - on TPN, tube feed to start 10/15        EN Evaluation    TF Changes Tube feed to start per order 24hrs after PEG " placed     TF Residual  -- -     TF Tolerance  -- -           Malnutrition Severity Assessment     Row Name 10/11/19 0849          Malnutrition Severity Assessment    Malnutrition Type  Chronic Disease - Related Malnutrition        Insufficient Energy Intake     Insufficient Energy Intake   <75% of est. energy requirement for > or equal to 1 month Per family pt has not been eating well for awhile and has had trouble eating since the stroke 9/14/19        Unintentional Weight Loss     Unintentional Weight Loss   Weight loss greater than 10% in six months Weight loss of 104% x 6 months- weight 4/19- 172 lbs and current body weight 154.5 (10/10/19)        Muscle Loss    Loss of Muscle Mass Findings  Severe     Scientology Region  Severe - deep hollowing/scooping, lack of muscle to touch, facial bones well defined     Clavicle Bone Region  Severe - protruding prominent bone     Acromion Bone Region  Severe - squared shoulders, bones, and acromion process protrusion prominent     Scapular Bone Region  Severe - prominent bones, depressions easily visible between ribs, scapula, spine, shoulders     Dorsal Hand Region  None     Patellar Region  Moderate - patella more prominent, less muscle definition around patella     Anterior Thigh Region  Moderate - mild depression on inner thigh     Posterior Calf Region  Moderate - some roundness, slight firmness        Fat Loss    Subcutaneous Fat Loss Findings  Severe     Orbital Region   Severe - pronounced hollowness/depression, dark circles, loose saggy skin     Upper Arm Region  Severe - mostly skin, very little space between folds, fingers touch     Thoracic & Lumbar Region  -- did not obtain d/t positioning.         Criteria Met (Must meet criteria for severity in at least 2 of these categories: M Wasting, Fat Loss, Fluid, Secondary Signs, Wt. Status, Intake)    Patient meets criteria for   Severe Malnutrition           Problem/Interventions:  Problem 1                Nutrition  Diagnoses Problem 1    Problem 1 Severe chronic malnutrition      Etiology (related to) physiological causes (stroke, MI)     Signs/Symptoms (evidenced by) severe muscle wasting, severe fat loss, < 75% of est energy requirement for >/= 1 month, and 10.4% weight loss x 6 months.                  Intervention Goal                Intervention Goal    General Tube feed initiation and transition from TPN    Tube feed tolerance         Nutrition Intervention                Nutrition Intervention    RD/Tech Action Initiate tube feed at initial goal and advance as tolerated         Nutrition Prescription                Nutrition Prescription EN    Enteral Prescription NPO + Tube feed + TPN    Currently receiving Clinimix 5/15 1560mL volume. TPN day #4 today. TPN to d/c after this dose and transition to tube feed per PEG placed 10/14.    Initial Goal: Nutren 1.5 at 55mL/hr + 40mL water flush q1hr    End goal: Nutren 1.5 at 75mL/hr + 40mL water flush q1hr provides 2475kcals (102%), 112gPRO (108%), 2134mL free water         Education/Evaluation                Monitor/Evaluation    Monitor TF tolerance, labs, BM, weight, transition from TPN to TF           Electronically signed by:  Delfino Thompson RD  10/14/19 3:28 PM

## 2019-10-14 NOTE — ANESTHESIA POSTPROCEDURE EVALUATION
Patient: Anuj Escobedo    Procedure Summary     Date:  10/14/19 Room / Location:  [facility] ENDOSCOPY 1 / [facility] ENDOSCOPY    Anesthesia Start:  0855 Anesthesia Stop:  0921    Procedure:  ESOPHAGOGASTRODUODENOSCOPY WITH PERCUTANEOUS ENDOSCOPIC GASTROSTOMY TUBE INSERTION (N/A ) Diagnosis:       Oropharyngeal dysphagia      (Oropharyngeal dysphagia [R13.12])    Surgeon:  Cathryn Glynn MD Provider:      Anesthesia Type:  Not recorded ASA Status:  Not recorded          Anesthesia Type: No value filed.  Last vitals  BP   93/71 (10/14/19 0830)   Temp   98.7 °F (37.1 °C) (10/14/19 0221)   Pulse   63 (10/14/19 0830)   Resp   16 (10/14/19 0221)     SpO2   98 % (10/14/19 0830)     Post Anesthesia Care and Evaluation    Patient location during evaluation: PACU  Patient participation: complete - patient participated  Level of consciousness: awake  Pain scale: See nurse's notes for pain score.  Pain management: adequate  Airway patency: patent  Anesthetic complications: No anesthetic complications  PONV Status: none  Cardiovascular status: acceptable  Respiratory status: acceptable  Hydration status: acceptable    Comments: Patient seen and examined postoperatively; vital signs stable; SpO2 greater than or equal to 90%; cardiopulmonary status stable; nausea/vomiting adequately controlled; pain adequately controlled; no apparent anesthesia complications; patient discharged from anesthesia care when discharge criteria were met

## 2019-10-14 NOTE — PLAN OF CARE
Problem: Patient Care Overview  Goal: Plan of Care Review  Outcome: Ongoing (interventions implemented as appropriate)   10/14/19 7172   Coping/Psychosocial   Plan of Care Reviewed With patient;daughter   Plan of Care Review   Progress no change   OTHER   Outcome Summary 89 yo male adm for dysphagia. Has now had g tube placed to assist w/ swallowing and nutrition. Pt is far below level he had achieved in IP rehab. Pt is s/p recent cva.. Will need further IP rehab at d/c.

## 2019-10-14 NOTE — THERAPY TREATMENT NOTE
Acute Care - Physical Therapy Treatment Note   Zhao     Patient Name: Anuj Escobedo  : 1925  MRN: [id number]  Today's Date: 10/14/2019             Admit Date: 10/10/2019    Visit Dx:    ICD-10-CM ICD-9-CM   1. Dysphagia, unspecified type R13.10 787.20   2. Oropharyngeal dysphagia R13.12 787.22     Patient Active Problem List   Diagnosis   • Dysphagia   • Atrial fibrillation, chronic   • Dyslipidemia   • Degenerative arthritis   • Coronary artery disease   • Benign prostatic hyperplasia   • Hypertension   • Long term current use of anticoagulant therapy   • Mixed hyperlipidemia   • Peripheral edema   • Primary malignant neoplasm of head (CMS/HCC)   • Shingles   • Oropharyngeal dysphagia   • Abrasions of multiple sites       Therapy Treatment    Rehabilitation Treatment Summary     Row Name 10/14/19 1300             Treatment Time/Intention    Discipline  physical therapist  -CM      Document Type  therapy note (daily note)  -CM      Subjective Information  complains of tired; sleepy  -CM      Mode of Treatment  physical therapy;individual therapy  -CM      Patient/Family Observations  Pt reports he is sleepy since sx. Asked if he has had sx yet   -CM      Therapy Frequency (PT Clinical Impression)  3 times/wk  -CM      Patient Effort  excellent  -CM      Comment  Pt had g-tube placed earlier today to manage dysphagia. Wearing abdom binder now.  -CM      Recorded by [CM] Iris Kelley, PT 10/14/19 1332      Row Name 10/14/19 1300             Cognitive Assessment/Intervention- PT/OT    Orientation Status (Cognition)  oriented x 3 didn't realize his g tube sx was completed.   -CM      Recorded by [CM] Iris Kelley, PT 10/14/19 1332      Row Name 10/14/19 1300             Bed Mobility Assessment/Treatment    Bed Mobility Assessment/Treatment  bed mobility (all) activities  -CM      [place] Level (Bed Mobility)  maximum assist (25% patient effort);2 person assist  -CM      Supine-Sit [place]  (Bed Mobility)  maximum assist (25% patient effort);2 person assist  -CM      Sit-Supine [place] (Bed Mobility)  maximum assist (25% patient effort);2 person assist  -CM      Bed Mobility, Safety Issues  decreased use of arms for pushing/pulling;decreased use of legs for bridging/pushing;other (see comments) extensor spasticity interfering w/ movement  -CM      Assistive Device (Bed Mobility)  draw sheet;head of bed elevated;bed rails  -CM      Comment (Bed Mobility)  strong predominance of L sided extensor tone initially; later able to sit w/ RUE support  -CM      Recorded by [CM] Iris Kelley, PT 10/14/19 1332      Row Name 10/14/19 1300             Static Sitting Balance    Level of [place] (Unsupported Sitting, Static Balance)  contact guard assist;other (see comments) initially needs max asst but progressed to cga x 5 min  -CM      Sitting Position (Unsupported Sitting, Static Balance)  sitting on edge of bed;other (see comments) holds bedrail w/ R hand; PT guarding LEs closely  -CM      Recorded by [CM] Iris Kelley, PT 10/14/19 1332      Row Name 10/14/19 1300             Positioning and Restraints    Pre-Treatment Position  in bed  -CM      Post Treatment Position  bed  -CM      In Bed  side lying right;notified nsg;call light within reach;encouraged to call for assist;exit alarm on;with family/caregiver  -CM      Recorded by [CM] Iris Kelley, PT 10/14/19 1332      Row Name 10/14/19 1300             Pain Scale: Numbers Pre/Post-Treatment    Pain Scale: Numbers, Pretreatment  0/10 - no pain  -CM      Pain Scale: Numbers, Post-Treatment  0/10 - no pain  -CM      Recorded by [CM] Iris Kelley, PT 10/14/19 1332      Row Name                Wound 10/10/19 1809 Left anterior;lower leg Abrasion    Wound - Properties Group Date first assessed: 10/10/19 [CR] Time first assessed: 1809 [CR] Side: Left [CR] Orientation: anterior;lower [CR] Location: leg [CR] Primary Wound Type: Abrasion  [CR] Recorded by:  [CR] Marzena Tolentino RN 10/10/19 1809    Row Name                Wound 10/10/19 1809 Right gluteal Skin Tear    Wound - Properties Group Date first assessed: 10/10/19 [CR] Time first assessed: 1809 [CR] Side: Right [CR] Location: gluteal [CR] Primary Wound Type: Skin tear [CR] Recorded by:  [CR] aMrzena Tolentino RN 10/10/19 1809    Row Name                Wound 10/10/19 1810 Right gluteal Abrasion    Wound - Properties Group Date first assessed: 10/10/19 [CR] Time first assessed: 1810 [CR] Side: Right [CR] Location: gluteal [CR] Primary Wound Type: Abrasion [CR] Recorded by:  [CR] Marzena Tolentino RN 10/10/19 1810    Row Name 10/14/19 1300             Coping    Observed Emotional State  accepting;calm;cooperative  -CM      Verbalized Emotional State  acceptance  -CM      Recorded by [CM] Iris Kelley, PT 10/14/19 1332      Row Name 10/14/19 1300             Plan of Care Review    Plan of Care Reviewed With  patient;family  -CM      Recorded by [CM] Iris Kelley, PT 10/14/19 1332      Row Name 10/14/19 1300             Outcome Summary/Treatment Plan (PT)    Daily Summary of Progress (PT)  progress towards functional goals is fair  -CM      Barriers to Overall Progress (PT)  limited today due to sx earlier today for g tube placement  -CM      Anticipated Discharge Disposition (PT)  inpatient rehabilitation facility  -CM      Recorded by [CM] Iris Kelley, PT 10/14/19 1332        User Key  (r) = Recorded By, (t) = Taken By, (c) = Cosigned By    Initials Name Effective Dates Discipline    CM Iris Kelley, PT 03/01/19 -  PT    CR Marzena Tolentino RN 03/01/19 -  Nurse          Wound 10/10/19 1809 Left anterior;lower leg Abrasion (Active)   Dressing Appearance dry;intact;no drainage 10/14/2019  7:15 AM   Closure [facility] 10/14/2019  7:15 AM   Base dressing in place, unable to visualize 10/14/2019  7:15 AM   Drainage Amount none 10/14/2019  7:15 AM   Dressing Care, Wound border  dressing 10/14/2019  7:15 AM       Wound 10/10/19 1809 Right gluteal Skin Tear (Active)   Dressing Appearance dry;intact;no drainage 10/14/2019  7:15 AM   Closure [place] 10/14/2019  7:15 AM   Base dressing in place, unable to visualize 10/14/2019  7:15 AM   Drainage Amount none 10/14/2019  7:15 AM   Dressing Care, Wound border dressing 10/14/2019  7:15 AM       Wound 10/10/19 1810 Right gluteal Abrasion (Active)   Dressing Appearance intact;dry;no drainage 10/14/2019  7:15 AM   Closure [facility] 10/14/2019  7:15 AM   Base dressing in place, unable to visualize 10/14/2019  7:15 AM   Drainage Amount none 10/14/2019  7:15 AM   Dressing Care, Wound border dressing 10/14/2019  7:15 AM           Physical Therapy Education     Title: PT OT SLP Therapies (Done)     Topic: Physical Therapy (Done)     Point: Mobility training (Done)     Learning Progress Summary           Patient Acceptance, E,TB, VU,NR by CM at 10/14/2019  1:33 PM    Comment:  encouraged exhaling w/ movement to decrease pain/discomfort    Acceptance, E,TB, VU,NR by MC at 10/12/2019  2:43 PM    Acceptance, E,TB, VU by CM at 10/11/2019 12:27 PM   Family Acceptance, E, VU by LS at 10/13/2019  1:21 PM    Acceptance, E,TB, VU by CM at 10/11/2019 12:27 PM                   Point: Home exercise program (Done)     Learning Progress Summary           Patient Acceptance, E,TB, VU,NR by MC at 10/12/2019  2:43 PM   Family Acceptance, E, VU by LS at 10/13/2019  1:21 PM                   Point: Body mechanics (Done)     Learning Progress Summary           Patient Acceptance, E,TB, VU,NR by CM at 10/14/2019  1:33 PM    Comment:  encouraged exhaling w/ movement to decrease pain/discomfort    Acceptance, E,TB, VU,NR by MC at 10/12/2019  2:43 PM    Acceptance, E,TB, VU by CM at 10/11/2019 12:27 PM   Family Acceptance, E, VU by LS at 10/13/2019  1:21 PM    Acceptance, E,TB, VU by CM at 10/11/2019 12:27 PM                   Point: Precautions (Done)     Learning Progress Summary            Patient Acceptance, E,TB, VU,NR by  at 10/14/2019  1:33 PM    Comment:  encouraged exhaling w/ movement to decrease pain/discomfort    Acceptance, E,TB, VU,NR by  at 10/12/2019  2:43 PM    Acceptance, E,TB, VU by  at 10/11/2019 12:27 PM   Family Acceptance, E, VU by  at 10/13/2019  1:21 PM    Acceptance, E,TB, VU by  at 10/11/2019 12:27 PM                               User Key     Initials Effective Dates Name Provider Type Discipline     03/01/19 -  Iris Kelley, PT Physical Therapist PT     03/01/19 -  Yuly Mike PTA Physical Therapy Assistant PT     03/01/19 -  Jo Ann Harper RN Registered Nurse Nurse                PT Recommendation and Plan  Anticipated Discharge Disposition (PT): inpatient rehabilitation facility  Planned Therapy Interventions (PT Eval): balance training, bed mobility training, gait training, motor coordination training, neuromuscular re-education, patient/family education, postural re-education, strengthening, stretching, transfer training  Therapy Frequency (PT Clinical Impression): 3 times/wk  Outcome Summary/Treatment Plan (PT)  Daily Summary of Progress (PT): progress towards functional goals is fair  Barriers to Overall Progress (PT): limited today due to sx earlier today for g tube placement  Anticipated Discharge Disposition (PT): inpatient rehabilitation facility  Plan of Care Reviewed With: patient, daughter  Progress: no change  Outcome Summary: 89 yo male adm for dysphagia. Has now had g tube placed to assist w/ swallowing and nutrition. Pt is far below level he had achieved in IP rehab. Pt is s/p recent cva.. Will need further IP rehab at d/c.   Outcome Measures     Row Name 10/12/19 1400             Modified [place] Scale    Modified [place] Scale  5 - Severe disability.  Bedridden, incontinent, and requiring constant nursing care and attention.  -        User Key  (r) = Recorded By, (t) = Taken By, (c) = Cosigned By    Initials Name Provider  Type    Yuly Esposito, PTA Physical Therapy Assistant         Time Calculation:   PT Charges     Row Name 10/14/19 1334 10/14/19 1144          Time Calculation    Start Time  1305  -CM  --     Stop Time  1322  -CM  --     Time Calculation (min)  17 min  -CM  --     PT Received On  10/14/19  -CM  --     PT - Next Appointment  10/16/19  -CM  10/15/19  -AB        Time Calculation- PT    Total Timed Code Minutes- PT  17 minute(s)  -CM  --       User Key  (r) = Recorded By, (t) = Taken By, (c) = Cosigned By    Initials Name Provider Type    Yue Jeffers, PT Physical Therapist    CM Iris Kelley, PT Physical Therapist        Therapy Charges for Today     Code Description Service Date Service Provider Modifiers Qty    38467790817 HC PT THERAPEUTIC ACT EA 15 MIN 10/14/2019 Iris Kelley, PT GP 1    94117460360 HC PT NEUROMUSC RE EDUCATION EA 15 MIN 10/14/2019 Iris Kelley, PT GP 1          PT G-Codes  Outcome Measure Options: Modified [place], AM-PAC 6 Clicks Basic Mobility (PT)  AM-PAC 6 Clicks Score (PT): 6  Modified [place] Scale: 5 - Severe disability.  Bedridden, incontinent, and requiring constant nursing care and attention.    Iris Kelley, PT  10/14/2019

## 2019-10-14 NOTE — ANESTHESIA PREPROCEDURE EVALUATION
Anesthesia Evaluation     NPO Solid Status: > 6 hours  NPO Liquid Status: > 6 hours           Airway   Mallampati: I  TM distance: >3 FB  Neck ROM: full  Dental      Pulmonary    Cardiovascular     Rhythm: irregular  Rate: normal    (+) hypertension well controlled 2 medications or greater, past MI  >12 months, CAD, dysrhythmias Atrial Fib, hyperlipidemia,       Neuro/Psych  (+) TIA,     GI/Hepatic/Renal/Endo      Musculoskeletal     Abdominal   (+) scaphoid   Substance History      OB/GYN          Other   (+) arthritis   history of cancer active                    Anesthesia Plan    ASA 4     MAC     intravenous induction   Anesthetic plan, all risks, benefits, and alternatives have been provided, discussed and informed consent has been obtained with: patient.

## 2019-10-14 NOTE — PLAN OF CARE
Problem: Patient Care Overview  Goal: Plan of Care Review  Outcome: Ongoing (interventions implemented as appropriate)    Goal: Interprofessional Rounds/Family Conf  Outcome: Ongoing (interventions implemented as appropriate)      Problem: Skin Injury Risk (Adult)  Goal: Skin Health and Integrity  Outcome: Ongoing (interventions implemented as appropriate)      Problem: Dysphagia (Adult)  Goal: Functional/Safe Swallow  Outcome: Ongoing (interventions implemented as appropriate)    Goal: Compensatory Techniques to Improve Safety/Function with Swallowing  Outcome: Ongoing (interventions implemented as appropriate)      Problem: Fall Risk (Adult)  Goal: Absence of Fall  Outcome: Outcome(s) achieved Date Met: 10/14/19      Problem: Nutrition, Parenteral (Adult)  Goal: Signs and Symptoms of Listed Potential Problems Will be Absent, Minimized or Managed (Nutrition, Parenteral)  Outcome: Ongoing (interventions implemented as appropriate)

## 2019-10-14 NOTE — PROGRESS NOTES
[facility] Medicine Services Daily Progress Note        Hospitalist Team  LOS 4 days      Patient Care Team:  Devon Arriaza Jr., MD as PCP - General    Assessment / Plan    Dysphagia  Multiple failed attempts at Dobbhoff tube insertion  Failed to place NG tube in IR despite multiple attempts  GI placed PEG tube today  On TPN to be given in the interim  Tube feed to be initiated tomorrow     Acute kidney injury  Resolved with hydration     Chronic hypotension  Systolic averages 100-110  Continue to monitor BP     History of CAD s/p CABG  Followed outpatient by [facility]  No active angina      Chronic atrial fibrillation   rate controlled  Currently on therapeutic dose Lovenox    Hyperlipidemia   On atorvastatin     Oral thrush/esophageal candidiasis  On fluconazole and  Liliana's Magic mouthwash     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  Eliquis on hold, Lovenox being dosed  Residual left-sided weakness  Continue baclofen for muscle spasm   Continue PT/OT  Repeat CT no acute findings     BPH  On  Flomax     Depression  on Zoloft     Vitamin D deficiency       History of squamous cell carcinoma     History of MI     VTE prophylaxis-on  Lovenox       Plan for disposition  Inpatient rehab    Chief Complaint / Subjective  CC: Dysphagia     Patient seen post PEG tube placement  Generalized body weakness otherwise no new symptoms    Brief Synopsis of Hospital Course/HPI  89-year-old  male with a past medical history of CVA, hyperlipidemia, CAD status post CABG, thrush, vitamin D deficiency, chronic atrial fibrillation, hypertension, and MI who presented to [facility] on 10/10/2019 with complaints of dysphagia.  Per the wife the patient had a stroke on 9/14/2019 and was admitted to [facility].  Patient was then discharged to [facility] and has been there for about 3 weeks.  Patient's wife states that the patient was able to eat some at [facility], however at [facility] he has  "progressively gotten weaker due to not being able to swallow.  Patient has had left-sided weakness as a result of the stroke.  Per the family last week patient was given a fourth of a tablet of Prozac and went unresponsive for 48 hours at [facility].  Patient has also lost 9 pounds in 10 days due to getting no nutrition.  He was placed on IV fluids at [facility] but was taken off of them due to them causing peripheral edema.  Denies recent nausea, vomiting, diarrhea, fever, chills.     In the ED, vital signs were stable.  GI was consulted.  Dobbhoff tube was ordered.      ROS:  Review of Systems   All other systems reviewed and are negative.        Family History   Problem Relation Age of Onset   • Atrial fibrillation Sister    • Atrial fibrillation Sister        Past Medical History:   Diagnosis Date   • CAD (coronary artery disease)     s/p CABG 1993 x single vessel   • Chronic atrial fibrillation    • Dyslipidemia    • ED (erectile dysfunction)    • HTN (hypertension)    • Long term current use of anticoagulant therapy    • Myocardial infarction (CMS/HCC)    • Shingles 09/2017   • TIA (transient ischemic attack) 2015       Social History     Socioeconomic History   • Marital status:      Spouse name: Not on file   • Number of children: Not on file   • Years of education: Not on file   • Highest education level: Not on file   Tobacco Use   • Smoking status: Former Smoker     Packs/day: 1.00     Types: Cigarettes   Substance and Sexual Activity   • Alcohol use: No           Objective      Vital Signs  Temp:  [97.3 °F (36.3 °C)-98.7 °F (37.1 °C)] 98.7 °F (37.1 °C)  Heart Rate:  [59-67] 59  Resp:  [11-16] 13  BP: ()/(36-96) 141/63  Oxygen Therapy  SpO2: 98 %  Pulse Oximetry Type: Continuous  Device (Oxygen Therapy): room air  Flowsheet Rows      First Filed Value   Admission Height  177.8 cm (70\") Documented at 10/10/2019 1145   Admission Weight  70.8 kg (156 lb) Documented at 10/10/2019 1145        Intake & " Output (last 3 days)       10/11 0701 - 10/12 0700 10/12 0701 - 10/13 0700 10/13 0701 - 10/14 0700 10/14 0701 - 10/15 0700    P.O. 0  0     I.V. (mL/kg)  200 (3)       1020 1527     Total Intake(mL/kg) 300 (4.2) 1220 (18.2) 1527 (22)     Urine (mL/kg/hr)  300 (0.2) 700 (0.4) 100 (0.5)    Total Output  300 700 100    Net +300 +920 +827 -100            Urine Unmeasured Occurrence 2 x 2 x 2 x         Lines, Drains & Airways    Active LDAs     Name:   Placement date:   Placement time:   Site:   Days:    PICC Double Lumen 10/11/19 Right Basilic   10/11/19    1945    Basilic   1    Peripheral IV 10/06/19 2030 Right Forearm   10/06/19    2030    Forearm   5                  Physical Exam:  General: well-developed and well-nourished, NAD  HEENT: NC/AT, EOMI, PERRLA  Heart: RRR. No murmur   Chest: CTAB, no w/r/r, normal respiratory effort  Abdominal: Soft. NT/ND. Bowel sounds present--PEG tube intact  Musculoskeletal: Normal ROM.  No edema. No calf tenderness.  Neurological: AAOx3, no focal deficits  Skin: Skin is warm and dry. No rash  Psychiatric: Normal mood and affect.      Procedures:  Procedure(s):  ESOPHAGOGASTRODUODENOSCOPY WITH PERCUTANEOUS ENDOSCOPIC GASTROSTOMY TUBE INSERTION    Procedure(s):  ESOPHAGOGASTRODUODENOSCOPY WITH PERCUTANEOUS ENDOSCOPIC GASTROSTOMY TUBE INSERTION  -------------------       Results Review:     I reviewed the patient's new clinical results.    Results from last 7 days   Lab Units 10/14/19  0410 10/13/19  0353 10/12/19  0325 10/11/19  0732 10/10/19  1440 10/09/19  1432 10/08/19  0450   SODIUM mmol/L 146* 149* 153* 151* 148* 145* 146*   POTASSIUM mmol/L 3.6 3.6 3.5* 3.9 4.6 4.6 4.4   CHLORIDE mmol/L 112* 112* 116* 114* 110 110 109   CO2 mmol/L 29.0 30.0 31.0 27.0 30.0 22.0 26.0   BUN mg/dL 23* 25* 32* 32* 29* 29* 23*   CREATININE mg/dL 0.90 1.00 1.10 1.20 1.30* 1.40* 1.20   GLUCOSE mg/dL 118* 126* 113* 79 109* 126* 98   ALBUMIN g/dL 2.30* 2.50* 2.30*  --  3.10*  --   --    BILIRUBIN  mg/dL 1.9* 2.5* 1.8*  --  2.6*  --   --    ALK PHOS U/L 67 74 67  --  81  --   --    AST (SGOT) U/L 31 30 28  --  31  --   --    ALT (SGPT) U/L 20 23 18  --  25  --   --      Cr Clearance Estimated Creatinine Clearance: 49.2 mL/min (by C-G formula based on SCr of 0.9 mg/dL).    Coag   Results from last 7 days   Lab Units 10/14/19  0411 10/10/19  1505   INR  1.25* 1.45*   APTT seconds 30.1  --        HbA1C No results found for: HGBA1C  Blood Glucose   Results from last 7 days   Lab Units 10/13/19  1204 10/13/19  0656 10/13/19  0007 10/12/19  0613   GLUCOSE mg/dL 126* 122* 102 108*       UA          Microbiology       Invalid input(s): PROCALCITONIN    ABG        EKG  ECG/EMG Results (most recent)     None          Imaging:  No radiology results for the last day         Xrays, labs reviewed personally by physician.    Medication Review:   I have reviewed the patient's current medication list    Scheduled Meds    Fat Emulsion Plant Based 250 mL Intravenous Once per day on Sun Tue Thu   fluconazole 200 mg Intravenous Daily   hydrocortisone-diphenhydramine-nystatin 15 mL Swish & Spit 4x Daily   pantoprazole 40 mg Intravenous Q AM   sodium chloride 10 mL Intravenous Q12H       Meds Infusions    Pharmacy Consult - Pharmacy to dose     Pharmacy to Dose enoxaparin (LOVENOX)     Pharmacy to Dose TPN     sodium chloride 9 mL/hr    sodium chloride 9 mL/hr Last Rate: 9 mL/hr (10/14/19 0846)       Meds PRN  •  bisacodyl  •  hydrALAZINE  •  influenza vaccine  •  Methocarbamol  •  [DISCONTINUED] ondansetron **OR** ondansetron  •  Pharmacy Consult - Pharmacy to dose  •  Pharmacy to Dose enoxaparin (LOVENOX)  •  Pharmacy to Dose TPN  •  [DISCONTINUED] potassium chloride **OR** [DISCONTINUED] potassium chloride **OR** potassium chloride  •  sodium chloride  •  sodium chloride  •  sodium chloride  •  sodium chloride  •  sodium chloride      Hospital problem list:    Dysphagia    Atrial fibrillation, chronic    Dyslipidemia     Degenerative arthritis    Coronary artery disease    Benign prostatic hyperplasia    Hypertension    Long term current use of anticoagulant therapy    Primary malignant neoplasm of head (CMS/HCC)    Oropharyngeal dysphagia    Abrasions of multiple sites        Ramon Freedman MD  10/14/19  10:05 AM

## 2019-10-14 NOTE — PROGRESS NOTES
[facility] Medicine Services Daily Progress Note        Hospitalist Team  LOS 3 days      Patient Care Team:  Devon Arriaza Jr., MD as PCP - General    Assessment / Plan    Dysphagia  - Multiple failed attempts at Dobbhoff tube insertion  - Failed to place NG tube in IR despite multiple attempts  - GI planning for PEG tube placement on Monday  - TPN to be given in the interim     Acute kidney injury  - Improving slowly with hydration     Chronic hypotension  - Systolic averages 100-110  - Continue to monitor BP     CAD s/p CABG  - Followed outpatient by [facility]  - Continue atorvastatin     Chronic atrial fibrillation  - On rate controlled  - Anticoagulation with Eliquis, currently on hold  - Lovenox ordered    Hyperlipidemia   -Continue atorvastatin     Oral thrush/esophageal candidiasis  - Continue fluconazole  - Continue Liliaan's Magic mouthwash     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  Multiple unsuccessful attempts at NG tube placement, no way to give patient oral meds, so they will be held with some being converted to IV medications where possible. Patient's son at bedside, case discussed and questions answered.     Brief Synopsis of Hospital Course/HPI  This is a 89-year-old  male with a past medical history of CVA, hyperlipidemia, CAD status post CABG, thrush, vitamin D deficiency, chronic atrial fibrillation, hypertension, and MI who presented to [facility] on 10/10/2019 with complaints of dysphagia.  Per the wife the patient had a stroke on 9/14/2019 and was admitted to [facility].  Patient was then discharged to [facility] and has been there for about 3 weeks.  Patient's wife states that the patient was able to eat some at [facility], however at [facility] he has progressively gotten weaker due to not being able to swallow.  Patient has had left-sided weakness as a result of the stroke.  Per the family last week patient was given a fourth of a tablet of Prozac and went unresponsive for 48 hours at [facility].  Patient has also lost 9 pounds in 10 days due to getting no nutrition.  He was placed on IV fluids at [facility] but was taken off of them due to them causing peripheral edema.  Denies recent nausea, vomiting, diarrhea, fever, chills.     In the ED, vital signs were stable.  GI was consulted.  Dobbhoff tube was ordered.      ROS:  Review of Systems   All other systems reviewed and are negative.        Family History   Problem Relation Age of Onset   • Atrial fibrillation Sister    • Atrial fibrillation Sister        Past Medical History:   Diagnosis Date   • CAD (coronary artery disease)     s/p CABG 1993 x single vessel   • Chronic atrial fibrillation    • Dyslipidemia    • ED (erectile dysfunction)    • HTN (hypertension)    • Long term current use of anticoagulant therapy    • Myocardial infarction (CMS/HCC)    • Shingles 09/2017   • TIA (transient ischemic attack) 2015       Social History     Socioeconomic History   • Marital status:      Spouse name: Not on file   • Number of children: Not on file   • Years of education: Not on file   • Highest education level: Not on file   Tobacco Use   • Smoking status: Former Smoker     Packs/day: 1.00     Types: Cigarettes   Substance and Sexual Activity   • Alcohol use: No           Objective      Vital Signs  Temp:  [97.3 °F (36.3 °C)] 97.3 °F (36.3 °C)  Heart Rate:  [63-67] 67  Resp:  [15-16] 16  BP: (126-191)/(61-96)  "156/96  Oxygen Therapy  SpO2: 98 %  Pulse Oximetry Type: Intermittent  Device (Oxygen Therapy): room air  Flowsheet Rows      First Filed Value   Admission Height  177.8 cm (70\") Documented at 10/10/2019 1145   Admission Weight  70.8 kg (156 lb) Documented at 10/10/2019 1145        Intake & Output (last 3 days)       10/11 0701 - 10/12 0700 10/12 0701 - 10/13 0700 10/13 0701 - 10/14 0700    P.O. 0  0    I.V. (mL/kg)  200 (3)      1020     Total Intake(mL/kg) 300 (4.2) 1220 (18.2) 0 (0)    Urine (mL/kg/hr)  300 (0.2) 400 (0.4)    Total Output  300 400    Net +300 +920 -400           Urine Unmeasured Occurrence 2 x 2 x 2 x        Lines, Drains & Airways    Active LDAs     Name:   Placement date:   Placement time:   Site:   Days:    PICC Double Lumen 10/11/19 Right Basilic   10/11/19    1945    Basilic   1    Peripheral IV 10/06/19 2030 Right Forearm   10/06/19    2030    Forearm   5                  Physical Exam:  General: well-developed and well-nourished, NAD  HEENT: NC/AT, EOMI, PERRLA  Heart: RRR. No murmur   Chest: CTAB, no w/r/r, normal respiratory effort  Abdominal: Soft. NT/ND. Bowel sounds present  Musculoskeletal: Normal ROM.  No edema. No calf tenderness.  Neurological: AAOx3, no focal deficits  Skin: Skin is warm and dry. No rash  Psychiatric: Normal mood and affect.      Procedures:  Procedure(s):  ESOPHAGOGASTRODUODENOSCOPY WITH PERCUTANEOUS ENDOSCOPIC GASTROSTOMY TUBE INSERTION    Procedure(s):  ESOPHAGOGASTRODUODENOSCOPY WITH PERCUTANEOUS ENDOSCOPIC GASTROSTOMY TUBE INSERTION  -------------------       Results Review:     I reviewed the patient's new clinical results.    Results from last 7 days   Lab Units 10/13/19  0353 10/12/19  0325 10/11/19  0732 10/10/19  1440 10/09/19  1432 10/08/19  0450 10/07/19  0445   SODIUM mmol/L 149* 153* 151* 148* 145* 146* 145*   POTASSIUM mmol/L 3.6 3.5* 3.9 4.6 4.6 4.4 4.2   CHLORIDE mmol/L 112* 116* 114* 110 110 109 110   CO2 mmol/L 30.0 31.0 27.0 30.0 22.0 " 26.0 28.0   BUN mg/dL 25* 32* 32* 29* 29* 23* 23*   CREATININE mg/dL 1.00 1.10 1.20 1.30* 1.40* 1.20 1.20   GLUCOSE mg/dL 126* 113* 79 109* 126* 98 125*   ALBUMIN g/dL 2.50* 2.30*  --  3.10*  --   --   --    BILIRUBIN mg/dL 2.5* 1.8*  --  2.6*  --   --   --    ALK PHOS U/L 74 67  --  81  --   --   --    AST (SGOT) U/L 30 28  --  31  --   --   --    ALT (SGPT) U/L 23 18  --  25  --   --   --      Cr Clearance Estimated Creatinine Clearance: 42.9 mL/min (by C-G formula based on SCr of 1 mg/dL).    Coag   Results from last 7 days   Lab Units 10/10/19  1505   INR  1.45*       HbA1C No results found for: HGBA1C  Blood Glucose   Results from last 7 days   Lab Units 10/13/19  1204 10/13/19  0656 10/13/19  0007 10/12/19  0613   GLUCOSE mg/dL 126* 122* 102 108*       UA          Microbiology       Invalid input(s): PROCALCITONIN    ABG        EKG  ECG/EMG Results (most recent)     None          Imaging:  No radiology results for the last day         Xrays, labs reviewed personally by physician.    Medication Review:   I have reviewed the patient's current medication list    Scheduled Meds    Fat Emulsion Plant Based 250 mL Intravenous Once per day on Sun Tue Thu   fluconazole 200 mg Intravenous Daily   hydrocortisone-diphenhydramine-nystatin 15 mL Swish & Spit 4x Daily   pantoprazole 40 mg Intravenous Q AM   sodium chloride 10 mL Intravenous Q12H       Meds Infusions    Adult Central Clinimix TPN  Last Rate: 66 mL/hr at 10/13/19 7178   Pharmacy Consult - Pharmacy to dose     Pharmacy to Dose enoxaparin (LOVENOX)     Pharmacy to Dose TPN         Meds PRN  bisacodyl  •  hydrALAZINE  •  influenza vaccine  •  Methocarbamol  •  [DISCONTINUED] ondansetron **OR** ondansetron  •  Pharmacy Consult - Pharmacy to dose  •  Pharmacy to Dose enoxaparin (LOVENOX)  •  Pharmacy to Dose TPN  •  [DISCONTINUED] potassium chloride **OR** [DISCONTINUED] potassium chloride **OR** potassium chloride  •  sodium chloride  •  sodium chloride  •   sodium chloride      Hospital problem list:    Dysphagia    Atrial fibrillation, chronic    Dyslipidemia    Degenerative arthritis    Coronary artery disease    Benign prostatic hyperplasia    Hypertension    Long term current use of anticoagulant therapy    Primary malignant neoplasm of head (CMS/HCC)    Oropharyngeal dysphagia    Abrasions of multiple sites        Sarah Guajardo MD  10/13/19  9:48 PM

## 2019-10-14 NOTE — PROGRESS NOTES
Continued Stay Note  [facility]     Patient Name: Anuj Escobedo  MRN: [id number]  Today's Date: 10/14/2019    Admit Date: 10/10/2019    Discharge Plan     Row Name 10/14/19 1534       Plan    Plan  Anticipate inpatient rehab, [facility] declines due to no bed available, awaiting second choice from family.     Patient/Family in Agreement with Plan  yes    Plan Comments  Notified [facility] Rendon, of updated PT notes and requested to be notified if precert is able to be started today. [facility] now report no bed will be available. Met with patient's wife and family who refuse to make second choice at this time, two calls made to [facility], with no answer. Family reports they will make a decision this evening and pass information along as decisions are made. Plan is to trial feeds tomorrow.         Tonya Maddox  [phone]

## 2019-10-14 NOTE — OP NOTE
ESOPHAGOGASTRODUODENOSCOPY WITH PERCUTANEOUS ENDOSCOPIC GASTROSTOMY TUBE INSERTION Procedure Report    Patient Name:  Anuj Escobedo  YOB: 1925    Date of Surgery:  10/14/2019     Pre-Op Diagnosis:  Oropharyngeal dysphagia [R13.12]       Post-Op Diagnosis Codes:     * Oropharyngeal dysphagia [R13.12]      Procedure/CPT® Codes:      Procedure(s):  ESOPHAGOGASTRODUODENOSCOPY WITH PERCUTANEOUS ENDOSCOPIC GASTROSTOMY TUBE INSERTION    Staff:  Surgeon(s):  Cathryn Glynn MD      Anesthesia: Monitored Anesthesia Care    Description of Procedure:  A description of the procedure as well as risks, benefits and alternative methods were explained to the patient and or power of  who voiced understanding and signed the corresponding consent form. A physical exam was performed and vital signs were monitored throughout the procedure.    An upper GI endoscope was placed into the mouth and proceeded through the esophagus, stomach and second portion of the duodenum without difficulty. The scope was then retroflexed and the fundus was visualized. A 20 Congolese PEG was placed in the stomach body using standard Push technique. The procedure was not difficult and there were no immediate complications.    Large hiatal hernia was noticed at least 6 to 7 cm along with significant esophageal dysmotility consistent with a presbyesophagus with poor emptying of the esophagus.  Few antral erosion was seen.  Dual mucosa looks normal.  After selecting adequate spot with the transillumination as well as ballottement, PEG tube was placed using a standard pull technique.  Prior to that, area was inflated with a lidocaine after cleaning with a Betadine.  Subsequently safe track technique was used ruling out any intervening viscera.  Trocar cannula was introduced after incision was made under direct utilization wire was passed through the trocar cannula, help with the snare and PEG tube was placed over that.    Impression:  1.  Large  hiatal hernia  2.  Successful PEG tube placement.  3.  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    Recommendations:  Patient will be kept n.p.o.  Continue with the PPI.  Add low-dose Reglan.  We will start tube feeding in the morning.      Cathryn Glynn MD     Date: 10/14/2019    Time: 9:30 AM

## 2019-10-15 ENCOUNTER — INPATIENT HOSPITAL (OUTPATIENT)
Dept: URBAN - METROPOLITAN AREA HOSPITAL 84 | Facility: HOSPITAL | Age: 84
End: 2019-10-15
Payer: COMMERCIAL

## 2019-10-15 DIAGNOSIS — R63.3 FEEDING DIFFICULTIES: ICD-10-CM

## 2019-10-15 DIAGNOSIS — B37.0 CANDIDAL STOMATITIS: ICD-10-CM

## 2019-10-15 DIAGNOSIS — R63.4 ABNORMAL WEIGHT LOSS: ICD-10-CM

## 2019-10-15 DIAGNOSIS — K44.9 DIAPHRAGMATIC HERNIA WITHOUT OBSTRUCTION OR GANGRENE: ICD-10-CM

## 2019-10-15 DIAGNOSIS — N17.9 ACUTE KIDNEY FAILURE, UNSPECIFIED: ICD-10-CM

## 2019-10-15 LAB
ALBUMIN SERPL-MCNC: 2.4 G/DL (ref 3.5–4.8)
ALBUMIN/GLOB SERPL: 0.7 G/DL (ref 1–1.7)
ALP SERPL-CCNC: 68 U/L (ref 32–91)
ALT SERPL W P-5'-P-CCNC: 19 U/L (ref 17–63)
ANION GAP SERPL CALCULATED.3IONS-SCNC: 10 MMOL/L (ref 5–15)
AST SERPL-CCNC: 29 U/L (ref 15–41)
BASOPHILS # BLD AUTO: 0 10*3/MM3 (ref 0–0.2)
BASOPHILS NFR BLD AUTO: 0.2 % (ref 0–1.5)
BILIRUB SERPL-MCNC: 2.1 MG/DL (ref 0.3–1.2)
BUN BLD-MCNC: 24 MG/DL (ref 8–20)
BUN/CREAT SERPL: 26.7 (ref 6.2–20.3)
CA-I SERPL ISE-MCNC: 1.3 MMOL/L (ref 1.2–1.3)
CALCIUM SPEC-SCNC: 9.1 MG/DL (ref 8.9–10.3)
CHLORIDE SERPL-SCNC: 110 MMOL/L (ref 101–111)
CO2 SERPL-SCNC: 29 MMOL/L (ref 22–32)
CREAT BLD-MCNC: 0.9 MG/DL (ref 0.7–1.2)
DEPRECATED RDW RBC AUTO: 49.9 FL (ref 37–54)
EOSINOPHIL # BLD AUTO: 0 10*3/MM3 (ref 0–0.4)
EOSINOPHIL NFR BLD AUTO: 0.6 % (ref 0.3–6.2)
ERYTHROCYTE [DISTWIDTH] IN BLOOD BY AUTOMATED COUNT: 15.5 % (ref 12.3–15.4)
GFR SERPL CREATININE-BSD FRML MDRD: 79 ML/MIN/1.73
GLOBULIN UR ELPH-MCNC: 3.3 GM/DL (ref 2.5–3.8)
GLUCOSE BLD-MCNC: 117 MG/DL (ref 65–99)
HCT VFR BLD AUTO: 45.2 % (ref 37.5–51)
HGB BLD-MCNC: 15.3 G/DL (ref 13–17.7)
LYMPHOCYTES # BLD AUTO: 1 10*3/MM3 (ref 0.7–3.1)
LYMPHOCYTES NFR BLD AUTO: 13.7 % (ref 19.6–45.3)
MAGNESIUM SERPL-MCNC: 1.9 MG/DL (ref 1.8–2.5)
MCH RBC QN AUTO: 31.3 PG (ref 26.6–33)
MCHC RBC AUTO-ENTMCNC: 33.7 G/DL (ref 31.5–35.7)
MCV RBC AUTO: 92.7 FL (ref 79–97)
MONOCYTES # BLD AUTO: 0.7 10*3/MM3 (ref 0.1–0.9)
MONOCYTES NFR BLD AUTO: 9.2 % (ref 5–12)
NEUTROPHILS # BLD AUTO: 5.8 10*3/MM3 (ref 1.7–7)
NEUTROPHILS NFR BLD AUTO: 76.3 % (ref 42.7–76)
NRBC BLD AUTO-RTO: 0 /100 WBC (ref 0–0.2)
PHOSPHATE SERPL-MCNC: 2.5 MG/DL (ref 2.4–4.7)
PLATELET # BLD AUTO: 144 10*3/MM3 (ref 140–450)
PMV BLD AUTO: 8 FL (ref 6–12)
POTASSIUM BLD-SCNC: 4 MMOL/L (ref 3.6–5.1)
PREALB SERPL-MCNC: 8.2 MG/DL (ref 16–38)
PROT SERPL-MCNC: 5.7 G/DL (ref 6.1–7.9)
RBC # BLD AUTO: 4.88 10*6/MM3 (ref 4.14–5.8)
SODIUM BLD-SCNC: 145 MMOL/L (ref 136–144)
WBC NRBC COR # BLD: 7.6 10*3/MM3 (ref 3.4–10.8)

## 2019-10-15 PROCEDURE — 97112 NEUROMUSCULAR REEDUCATION: CPT

## 2019-10-15 PROCEDURE — 99232 SBSQ HOSP IP/OBS MODERATE 35: CPT | Performed by: INTERNAL MEDICINE

## 2019-10-15 PROCEDURE — 25010000002 FLUCONAZOLE PER 200 MG: Performed by: INTERNAL MEDICINE

## 2019-10-15 PROCEDURE — 83735 ASSAY OF MAGNESIUM: CPT | Performed by: DIETITIAN, REGISTERED

## 2019-10-15 PROCEDURE — 80053 COMPREHEN METABOLIC PANEL: CPT | Performed by: INTERNAL MEDICINE

## 2019-10-15 PROCEDURE — 97535 SELF CARE MNGMENT TRAINING: CPT

## 2019-10-15 PROCEDURE — 97530 THERAPEUTIC ACTIVITIES: CPT

## 2019-10-15 PROCEDURE — 82330 ASSAY OF CALCIUM: CPT | Performed by: INTERNAL MEDICINE

## 2019-10-15 PROCEDURE — 84134 ASSAY OF PREALBUMIN: CPT | Performed by: INTERNAL MEDICINE

## 2019-10-15 PROCEDURE — 97166 OT EVAL MOD COMPLEX 45 MIN: CPT

## 2019-10-15 PROCEDURE — 85025 COMPLETE CBC W/AUTO DIFF WBC: CPT | Performed by: NURSE PRACTITIONER

## 2019-10-15 PROCEDURE — 84100 ASSAY OF PHOSPHORUS: CPT | Performed by: DIETITIAN, REGISTERED

## 2019-10-15 PROCEDURE — 99232 SBSQ HOSP IP/OBS MODERATE 35: CPT | Performed by: NURSE PRACTITIONER

## 2019-10-15 RX ORDER — LANSOPRAZOLE
30 KIT EVERY MORNING
Status: DISCONTINUED | OUTPATIENT
Start: 2019-10-16 | End: 2019-10-21 | Stop reason: HOSPADM

## 2019-10-15 RX ORDER — POTASSIUM CHLORIDE 1.5 G/1.77G
20 POWDER, FOR SOLUTION ORAL DAILY
Status: DISCONTINUED | OUTPATIENT
Start: 2019-10-16 | End: 2019-10-21 | Stop reason: HOSPADM

## 2019-10-15 RX ADMIN — FLUCONAZOLE, SODIUM CHLORIDE 200 MG: 2 INJECTION INTRAVENOUS at 10:53

## 2019-10-15 RX ADMIN — APIXABAN 5 MG: 5 TABLET, FILM COATED ORAL at 23:45

## 2019-10-15 RX ADMIN — NYSTATIN 15 ML: 100000 SUSPENSION ORAL at 17:50

## 2019-10-15 RX ADMIN — ATORVASTATIN CALCIUM 5 MG: 10 TABLET, FILM COATED ORAL at 23:45

## 2019-10-15 RX ADMIN — NYSTATIN 15 ML: 100000 SUSPENSION ORAL at 08:07

## 2019-10-15 RX ADMIN — PANTOPRAZOLE SODIUM 40 MG: 40 INJECTION, POWDER, FOR SOLUTION INTRAVENOUS at 06:32

## 2019-10-15 RX ADMIN — NYSTATIN 15 ML: 100000 SUSPENSION ORAL at 14:09

## 2019-10-15 RX ADMIN — NYSTATIN 15 ML: 100000 SUSPENSION ORAL at 21:30

## 2019-10-15 RX ADMIN — SERTRALINE HYDROCHLORIDE 25 MG: 50 TABLET ORAL at 23:44

## 2019-10-15 RX ADMIN — TAMSULOSIN HYDROCHLORIDE 0.4 MG: 0.4 CAPSULE ORAL at 23:44

## 2019-10-15 RX ADMIN — Medication 10 ML: at 23:46

## 2019-10-15 RX ADMIN — MELATONIN 1000 UNITS: at 08:07

## 2019-10-15 RX ADMIN — POTASSIUM CHLORIDE 20 MEQ: 1500 TABLET, EXTENDED RELEASE ORAL at 08:07

## 2019-10-15 NOTE — PROGRESS NOTES
LOS: 5 days   Patient Care Team:  Devon Arriaza Jr., MD as PCP - General      Subjective Sleeping    Interval History: Family reports he is resting well.  Nurse was able to give medications through tube without difficulty and pending start of tube feeds this morning.  No acute issues overnight noted    Subjective: see ablve      ROS:   Sleeping, not assessed       Medication Review:     Current Facility-Administered Medications:   •  Adult Central Clinimix TPN, , Intravenous, Q24H (TPN), Cathryn Glynn MD, Last Rate: 66 mL/hr at 10/14/19 1755  •  atorvastatin (LIPITOR) tablet 5 mg, 5 mg, Oral, Nightly, Ramon Freedman MD, 5 mg at 10/14/19 2013  •  bisacodyl (DULCOLAX) suppository 10 mg, 10 mg, Rectal, Daily PRN, Marisela Kaufman APRN  •  cholecalciferol (VITAMIN D3) tablet 1,000 Units, 1,000 Units, Oral, Daily, Ramon Freedman MD, 1,000 Units at 10/15/19 0807  •  [START ON 10/16/2019] enoxaparin (LOVENOX) syringe 70 mg, 70 mg, Subcutaneous, Q12H, Kiara Gutierrez APRN  •  Fat Emulsion Plant Based (INTRALIPID,LIPOSYN) 20 % infusion 50 g, 250 mL, Intravenous, Once per day on Sun Tue ThErica, Kamran Hdz MD, Last Rate: 20.8 mL/hr at 10/13/19 1944, 50 g at 10/13/19 1944  •  fluconazole (DIFLUCAN) IVPB 200 mg, 200 mg, Intravenous, Daily, Sarah Guajardo MD, Last Rate: 0 mL/hr at 10/14/19 1123, 200 mg at 10/15/19 1053  •  hydrALAZINE (APRESOLINE) injection 10 mg, 10 mg, Intravenous, Q6H PRN, Marisela Kaufman APRN, 10 mg at 10/13/19 0419  •  hydrocortisone-diphenhydramine-nystatin (MAGIC MOUTHWASH) suspension 15 mL, 15 mL, Swish & Spit, 4x Daily, Marisela Kaufman APRN, 15 mL at 10/15/19 0807  •  influenza vac split quad (FLUZONE,FLUARIX,AFLURIA) injection 0.5 mL, 0.5 mL, Intramuscular, During Hospitalization, Sarah Guajardo MD  •  melatonin tablet 5 mg, 5 mg, Oral, Nightly PRN, Ramon Freedman MD  •  Methocarbamol (ROBAXIN) injection 500 mg, 500 mg, Intravenous, Nightly PRN, Sarah Guajardo,  MD  •  ondansetron (ZOFRAN) tablet 4 mg, 4 mg, Oral, Q6H PRN, 4 mg at 10/14/19 2014 **OR** ondansetron (ZOFRAN) injection 4 mg, 4 mg, Intravenous, Q6H PRN, Cathryn Glynn MD  •  pantoprazole (PROTONIX) injection 40 mg, 40 mg, Intravenous, Q AM, Marisela Kaufman APRN, 40 mg at 10/15/19 0632  •  Pharmacy Consult - Pharmacy to dose, , Does not apply, Continuous PRN, Lashay Snyder APRN  •  Pharmacy to Dose enoxaparin (LOVENOX), , Does not apply, Continuous PRN, Marisela Kaufman APRN  •  Pharmacy to Dose TPN, , Does not apply, Continuous PRN, Annette Phan APRN  •  potassium chloride (K-DUR,KLOR-CON) CR tablet 20 mEq, 20 mEq, Oral, Daily, Ramon Freedman MD, 20 mEq at 10/15/19 0807  •  [DISCONTINUED] potassium chloride (K-DUR,KLOR-CON) CR tablet 40 mEq, 40 mEq, Oral, PRN **OR** [DISCONTINUED] potassium chloride (KLOR-CON) packet 40 mEq, 40 mEq, Oral, PRN **OR** potassium chloride 10 mEq in 100 mL IVPB, 10 mEq, Intravenous, Q1H PRN, Marisela Kaufman APRN, Last Rate: 100 mL/hr at 10/12/19 0549, 10 mEq at 10/12/19 0549  •  sertraline (ZOLOFT) tablet 25 mg, 25 mg, Oral, Nightly, Ramon Freedman MD, 25 mg at 10/14/19 2013  •  sodium chloride 0.9 % flush 10 mL, 10 mL, Intravenous, PRN, Marisela Kaufman APRN  •  sodium chloride 0.9 % flush 10 mL, 10 mL, Intravenous, Q12H, Annette Phan APRN, 10 mL at 10/14/19 2014  •  sodium chloride 0.9 % flush 10 mL, 10 mL, Intravenous, PRN, Annette Phan APRN  •  sodium chloride 0.9 % flush 20 mL, 20 mL, Intravenous, PRN, Annette Phan APRN  •  sodium chloride 0.9 % infusion, 9 mL/hr, Intravenous, Continuous PRN, Osei Maharaj AA  •  sodium chloride 0.9 % infusion, 9 mL/hr, Intravenous, Continuous PRN, Osei Maharaj AA, Last Rate: 9 mL/hr at 10/14/19 0846, 9 mL/hr at 10/14/19 0846  •  tamsulosin (FLOMAX) 24 hr capsule 0.4 mg, 0.4 mg, Oral, Nightly, Ramon Freedman MD, 0.4 mg at 10/14/19 2013      Objective Resting in bed, no acute distress, family at  bedside    Vital Signs  Temp:  [97.3 °F (36.3 °C)] 97.3 °F (36.3 °C)  Heart Rate:  [71] 71  Resp:  [15] 15  BP: (114)/(86) 114/86  Physical Exam:    General Appearance:    Awake and alert, in no acute distress   Head:    Normocephalic, without obvious abnormality   Eyes:          Conjunctivae normal, anicteric sclera   Ears:    Hearing intact   Throat:   No oral lesions, oral mucosa moist   Neck:   supple, no JVD   Lungs:    respirations regular, even and unlabored       Abdomen:     Normal bowel sounds, soft, non-tender, no rebound or guarding, non-distended, G-tube without erythema or drainage, moved freely with minimal discomfort, binder noted   Rectal:     Deferred   Extremities:  no cyanosis, no redness   Skin:   No bleeding, bruising or rash, no jaundice   Neurologic:   Alert, sensation   intact        Results Review:    Lab Results (last 24 hours)     Procedure Component Value Units Date/Time    Magnesium [[id number]]  (Normal) Collected:  10/15/19 0604    Specimen:  Blood Updated:  10/15/19 0725     Magnesium 1.9 mg/dL     Phosphorus [[id number]]  (Normal) Collected:  10/15/19 0604    Specimen:  Blood Updated:  10/15/19 0725     Phosphorus 2.5 mg/dL     Comprehensive Metabolic Panel [[id number]]  (Abnormal) Collected:  10/15/19 0604    Specimen:  Blood Updated:  10/15/19 0725     Glucose 117 mg/dL      BUN 24 mg/dL      Creatinine 0.90 mg/dL      Sodium 145 mmol/L      Potassium 4.0 mmol/L      Chloride 110 mmol/L      CO2 29.0 mmol/L      Calcium 9.1 mg/dL      Total Protein 5.7 g/dL      Albumin 2.40 g/dL      ALT (SGPT) 19 U/L      AST (SGOT) 29 U/L      Alkaline Phosphatase 68 U/L      Total Bilirubin 2.1 mg/dL      eGFR Non African Amer 79 mL/min/1.73      Globulin 3.3 gm/dL      A/G Ratio 0.7 g/dL      BUN/Creatinine Ratio 26.7     Anion Gap 10.0 mmol/L     Narrative:       GFR Normal >60  Chronic Kidney Disease <60  Kidney Failure <15    Prealbumin [[id number]]  (Abnormal) Collected:  10/15/19 0604     Specimen:  Blood Updated:  10/15/19 0725     Prealbumin 8.2 mg/dL     Calcium, Ionized [[id number]]  (Normal) Collected:  10/15/19 0604    Specimen:  Blood Updated:  10/15/19 0715     Ionized Calcium 1.30 mmol/L     CBC & Differential [[id number]] Collected:  10/15/19 0604    Specimen:  Blood Updated:  10/15/19 0652    Narrative:       The following orders were created for panel order CBC & Differential.  Procedure                               Abnormality         Status                     ---------                               -----------         ------                     CBC Auto Differential[[id number]]        Abnormal            Final result                 Please view results for these tests on the individual orders.    CBC Auto Differential [[id number]]  (Abnormal) Collected:  10/15/19 0604    Specimen:  Blood Updated:  10/15/19 0652     WBC 7.60 10*3/mm3      RBC 4.88 10*6/mm3      Hemoglobin 15.3 g/dL      Hematocrit 45.2 %      MCV 92.7 fL      MCH 31.3 pg      MCHC 33.7 g/dL      RDW 15.5 %      RDW-SD 49.9 fl      MPV 8.0 fL      Platelets 144 10*3/mm3      Neutrophil % 76.3 %      Lymphocyte % 13.7 %      Monocyte % 9.2 %      Eosinophil % 0.6 %      Basophil % 0.2 %      Neutrophils, Absolute 5.80 10*3/mm3      Lymphocytes, Absolute 1.00 10*3/mm3      Monocytes, Absolute 0.70 10*3/mm3      Eosinophils, Absolute 0.00 10*3/mm3      Basophils, Absolute 0.00 10*3/mm3      nRBC 0.0 /100 WBC           Imaging Results (last 24 hours)     ** No results found for the last 24 hours. **            ASSESSMENT:    Feeding difficulties/dysphagia  Unintentional weight loss  Recent CVA-on Eliquis  Atrial fibrillation  Coronary artery disease status post CABG  Hypertension  AMANDA  Oral thrush  Weight loss   Hx of cholecystectomy     PLAN:  EGD yesterday with large hiatal hernia, successful PEG tube placement and severe esophageal dysmotility consistent with a presbyesophagus.  Okay to start tube feeds this morning per  dietitian recommendations.  Currently on TPN until at goal.  On Diflucan for thrush.  Continue PPI, change to per tube. Ok for medications through G-tube as well.  Continue supportive care.  Call if further GI issues, will see PRN.    Kiara Gutierrez, APRN  10/15/19  11:17 AM

## 2019-10-15 NOTE — PLAN OF CARE
Problem: Patient Care Overview  Goal: Plan of Care Review  Outcome: Ongoing (interventions implemented as appropriate)   10/15/19 4664   Coping/Psychosocial   Plan of Care Reviewed With patient;spouse;daughter   Plan of Care Review   Progress no change   OTHER   Outcome Summary Pt still undernourished & having difficulty w/ anesthesia effects S/P PEG placement. He was at an acute rehab walking 500' with assist of 2 but requiring continued max assist for ADL per family report. He now is requiring fully dependent assist for all bed mobility & ADL. Pt will benefit from return to IP rehab since despite his advanced age he was (I) with driving, ADL, & home management prior to his recent CVA.

## 2019-10-15 NOTE — PROGRESS NOTES
Case Management/Social Work    Patient Name:  Anuj Escobedo  YOB: 1925  MRN: [id number]  Admit Date:  10/10/2019        Sw met with pt,spouse and dtr to offer ecf list and discuss options.Family wants [facility] as first choice but requested referral be made to [facility] as 2nd choice referral made decision pending ,PASRR approved,needs precert      Electronically signed by:  Stephanie Jordan  10/15/19 4:17 PM   [phone]

## 2019-10-15 NOTE — THERAPY EVALUATION
Acute Care - Occupational Therapy Initial Evaluation  [facility]     Patient Name: Anuj Escobedo  : 1925  MRN: [id number]  Today's Date: 10/15/2019             Admit Date: 10/10/2019       ICD-10-CM ICD-9-CM   1. Dysphagia, unspecified type R13.10 787.20   2. Oropharyngeal dysphagia R13.12 787.22     Patient Active Problem List   Diagnosis   • Dysphagia   • Atrial fibrillation, chronic   • Dyslipidemia   • Degenerative arthritis   • Coronary artery disease   • Benign prostatic hyperplasia   • Hypertension   • Long term current use of anticoagulant therapy   • Mixed hyperlipidemia   • Peripheral edema   • Primary malignant neoplasm of head (CMS/HCC)   • Shingles   • Oropharyngeal dysphagia   • Abrasions of multiple sites     Past Medical History:   Diagnosis Date   • CAD (coronary artery disease)     s/p CABG  x single vessel   • Chronic atrial fibrillation    • Dyslipidemia    • ED (erectile dysfunction)    • HTN (hypertension)    • Long term current use of anticoagulant therapy    • Myocardial infarction (CMS/HCC)    • Shingles 2017   • TIA (transient ischemic attack)      Past Surgical History:   Procedure Laterality Date   • CARDIAC CATHETERIZATION  1993    RCA with subtotal occlusion with residual thrombus formation at very proximal portion; Lad first diagonal branch with proximal 50% stenosis   • CARPAL TUNNEL RELEASE     • CORONARY ARTERY BYPASS GRAFT  1993    single coronary artery bypass using saphenous vein to the right coronary artery   • ENDOSCOPY W/ PEG TUBE PLACEMENT N/A 10/14/2019    Procedure: ESOPHAGOGASTRODUODENOSCOPY WITH PERCUTANEOUS ENDOSCOPIC GASTROSTOMY TUBE INSERTION;  Surgeon: Cathryn Glynn MD;  Location: [facility];  Service: Gastroenterology   • INGUINAL HERNIA REPAIR Right 2015    (has had total of 3 hernia repairs)   • ORTHOPEDIC SURGERY            OT ASSESSMENT FLOWSHEET (last 12 hours)      Occupational Therapy Evaluation     Row Name 10/15/19 1023                    OT Evaluation Time/Intention    Subjective Information  no complaints  -        Document Type  evaluation  -        Mode of Treatment  occupational therapy  -        Patient Effort  excellent  -        Comment  Pt has PEG tube w/ ABD binder. He does not complain of pain. He is very motiated to participte in therapy & reports at [facility] he was walking up to 500' w/ assistance & RW.  -           General Information    General Observations of Patient  Spouse & dtr present  -        Prior Level of Function  mod assist:;ADL's prior to CVA Pt was (I) & drove, did yard work, etc.  -        Equipment Currently Used at Home  none has RW & tub bench at home if needed.  -        Pertinent History of Current Functional Problem  Pt is 89 y/o M admitted from acute rehab due to dysphagia. He was brought after 9 days of dysphagia during which he ate little. He was unable to have NG tube successfully placed & now is s/p PEG placement.   -           Relationship/Environment    Primary Source of Support/Comfort  spouse  -        Lives With  spouse  -        Primary Roles/Responsibilities  homemaker;retired retired   -           Resource/Environmental Concerns    Current Living Arrangements  home/apartment/condo  -           Home Main Entrance    Number of Stairs, Main Entrance  three  -           Cognitive Assessment/Intervention- PT/OT    Orientation Status (Cognition)  oriented x 4  -        Cognitive Assessment/Intervention Comment  Pt has been slow to recover from anesthesia effects  -           Bed Mobility Assessment/Treatment    Bed Mobility Assessment/Treatment  supine-sit  -        [place] Level (Bed Mobility)  maximum assist (25% patient effort)  -        Comment (Bed Mobility)  posterior lean  -           Transfer Assessment/Treatment    Transfer Assessment/Treatment  bed-chair transfer  -        Comment (Transfers)  Karma lift used as Pt was aunble  to lift hips off bed even w/ max (A) of 2. HE needs to have care taken with his Lt fot as it has decreased sensation in the foot & the LLE  pulls up in spastic synergy when his trunk extends.   -           Bed-Chair Transfer    Bed-Chair [place] (Transfers)  2 person assist;dependent (less than 25% patient effort)  -        Assistive Device (Bed-Chair Transfers)  mechanical lift/aid Sit-to-stand lift frame.  -           ADL Assessment/Intervention    BADL Assessment/Intervention  lower body dressing;grooming;feeding;toileting;bathing;upper body dressing;clothing fastener management all dependent  -           General ROM    GENERAL ROM COMMENTS  RUE WFL; LUE contracted at elbow, some in wrist extension, and digits have full PROM but needs resting hand splint. Advised family to bring from home. Pt needs elbow extension splint.  -           MMT (Manual Muscle Testing)    General MMT Comments  Lt  hand has strong  to maintain on adaptive device but requires hand placement due to lack of AROM finger extesnion. Shld flex is < 25% AROM on Lt & PROM is 50% on Lt. Elbow ext on Lt is 75%.  -           Static Sitting Balance    Level of [place] (Unsupported Sitting, Static Balance)  moderate assist, 50 to 74% patient effort  -           Dynamic Sitting Balance    Level of [place], Reaches Outside Midline (Sitting, Dynamic Balance)  unable to perform activity  -           Static Standing Balance    Level of [place] (Supported Standing, Static Balance)  2 person assist;dependent, less than 25% patient effort  -           Positioning and Restraints    Pre-Treatment Position  in bed  -        Post Treatment Position  chair  -           Pain Scale: Numbers Pre/Post-Treatment    Pain Scale: Numbers, Pretreatment  0/10 - no pain  -        Pain Scale: Numbers, Post-Treatment  0/10 - no pain  -           Wound 10/10/19 0612 Left anterior;lower leg Abrasion    Wound - Properties Group  Date first assessed: 10/10/19  -CR Time first assessed: 1809 -CR Side: Left  -CR Orientation: anterior;lower  -CR Location: leg  -CR Primary Wound Type: Abrasion  -CR       Wound 10/10/19 1809 Right gluteal Skin Tear    Wound - Properties Group Date first assessed: 10/10/19  -CR Time first assessed: 1809  -CR Side: Right  -CR Location: gluteal  -CR Primary Wound Type: Skin tear  -CR       Wound 10/10/19 1810 Right gluteal Abrasion    Wound - Properties Group Date first assessed: 10/10/19  -CR Time first assessed: 1810  -CR Side: Right  -CR Location: gluteal  -CR Primary Wound Type: Abrasion  -CR       Coping    Observed Emotional State  accepting  -        Verbalized Emotional State  acceptance  -           Plan of Care Review    Plan of Care Reviewed With  patient;daughter;spouse  -           Clinical Impression (OT)    Patient/Family Goals Statement (OT Eval)  Pt wishes to be up every day, to recover his ability to walk & eat food. Family are planning to install a ramp in case Pt is not able to regain his functional mobility.  -        Therapy Frequency (OT Eval)  5 times/wk  -        Predicted Duration of Therapy Intervention (Therapy Eval)  until D/C  -        Anticipated Discharge Disposition (OT)  inpatient rehabilitation facility  -           Planned OT Interventions    Planned Therapy Interventions (OT Eval)  activity tolerance training;BADL retraining;functional balance retraining;manual therapy/joint mobilization;neuromuscular control/coordination retraining;occupation/activity based interventions;orthotic fabrication/fitting/training;transfer/mobility retraining  -           OT Goals    Bed Mobility Goal Selection (OT)  bed mobility, OT goal 1  -        Transfer Goal Selection (OT)  transfer, OT goal 1  -        Bathing Goal Selection (OT)  bathing, OT goal 1  -           Bed Mobility Goal 1 (OT)    Activity/Assistive Device (Bed Mobility Goal 1, OT)  rolling to right;supine to sit   -        Divide Level/Cues Needed (Bed Mobility Goal 1, OT)  minimum assist (75% or more patient effort)  -        Time Frame (Bed Mobility Goal 1, OT)  2 weeks  -           Transfer Goal 1 (OT)    Activity/Assistive Device (Transfer Goal 1, OT)  bed-to-chair/chair-to-bed;toilet  -        Divide Level/Cues Needed (Transfer Goal 1, OT)  moderate assist (50-74% patient effort)  -        Time Frame (Transfer Goal 1, OT)  2 weeks  -           Bathing Goal 1 (OT)    Activity/Assistive Device (Bathing Goal 1, OT)  upper body bathing  -        Divide Level/Cues Needed (Bathing Goal 1, OT)  minimum assist (75% or more patient effort)  -        Time Frame (Bathing Goal 1, OT)  2 weeks  -           Living Environment    Home Accessibility  stairs to enter home;tub/shower is not walk in  -          User Key  (r) = Recorded By, (t) = Taken By, (c) = Cosigned By    Initials Name Effective Dates     Kaye Robertson OT 03/01/19 -     Marzena Mcconnell RN 03/01/19 -          Occupational Therapy Education     Title: PT OT SLP Therapies (In Progress)     Topic: Occupational Therapy (In Progress)     Point: Body mechanics (Done)     Description: Instruct learner(s) on proper positioning and spine alignment during self-care, functional mobility activities and/or exercises.    Learning Progress Summary           Patient Eager, TB, DU by  at 10/15/2019  1:34 PM   Family Laurener, TB, DU by  at 10/15/2019  1:34 PM                               User Key     Initials Effective Dates Name Provider Type Discipline     03/01/19 -  Kaye Robertson OT Occupational Therapist OT                  OT Recommendation and Plan  Outcome Summary/Treatment Plan (OT)  Anticipated Discharge Disposition (OT): inpatient rehabilitation facility  Planned Therapy Interventions (OT Eval): activity tolerance training, BADL retraining, functional balance retraining, manual therapy/joint mobilization, neuromuscular  control/coordination retraining, occupation/activity based interventions, orthotic fabrication/fitting/training, transfer/mobility retraining  Therapy Frequency (OT Eval): 5 times/wk  Plan of Care Review  Plan of Care Reviewed With: patient, spouse, daughter  Plan of Care Reviewed With: patient, spouse, daughter  Outcome Summary: Pt still undernourished & having difficulty w/ anesthesia effects S/P PEG placement. He was at an acute rehab walking 500' with assist of 2 but requiring continued dependent assist for ADL per family report. He now is requiring fully dependent assist for all bed mobility & ADL. Pt will benefit from return to  rehab since despite his advanced age he was (I) with driving, ADL, & home management prior to his recent CVA.    Outcome Measures     Row Name 10/12/19 1400             Modified [place] Scale    Modified [place] Scale  5 - Severe disability.  Bedridden, incontinent, and requiring constant nursing care and attention.  -        User Key  (r) = Recorded By, (t) = Taken By, (c) = Cosigned By    Initials Name Provider Type    Yuly Esposito PTA Physical Therapy Assistant          Time Calculation:   Time Calculation- OT     Row Name 10/15/19 1340             Time Calculation-     OT Start Time  1023  -      OT Stop Time  1105  -      OT Time Calculation (min)  42 min  -      Total Timed Code Minutes- OT  32 minute(s)  -      OT Received On  10/15/19  -      OT - Next Appointment  10/16/19  -      OT Goal Re-Cert Due Date  10/29/19  -        User Key  (r) = Recorded By, (t) = Taken By, (c) = Cosigned By    Initials Name Provider Type     Kaye Robertson OT Occupational Therapist        Therapy Charges for Today     Code Description Service Date Service Provider Modifiers Qty    [id number]  OT EVAL MOD COMPLEXITY 4 10/15/2019 Kaye Robertson OT GO 1    [id number]  OT NEUROMUSC RE EDUCATION EA 15 MIN 10/15/2019 Kaye Robertson OT GO 1    [id number]  OT  THERAPEUTIC ACT EA 15 MIN 10/15/2019 Kaye Robertson, OT GO 1    [id number] HC OT SELF CARE/MGMT/TRAIN EA 15 MIN 10/15/2019 Kaye Robertson OT GO 1               Kaye Robertson OT  10/15/2019

## 2019-10-15 NOTE — DISCHARGE PLACEMENT REQUEST
"Rita Escobedoos RUSSELL (89 y.o. Male)     Date of Birth Social Security Number Address Home Phone MRN    05/24/1925  [address] [address] [phone] [id number]    [employer] Marital Status          None        Admission Date Admission Type Admitting Provider Attending Provider Department, Room/Bed    10/10/19 Emergency Ramon Freedman MD Ozor, Uchenna, MD [facility] 3A MEDICAL INPATIENT, 310/1    Discharge Date Discharge Disposition Discharge Destination                       Attending Provider:  Ramon Freedman MD    Allergies:  Celecoxib, Ibuprofen    Isolation:  None   Infection:  None   Code Status:  CPR    Ht:  177.8 cm (70\")   Wt:  69.3 kg (152 lb 12.5 oz)    Admission Cmt:  None   Principal Problem:  Dysphagia [R13.10]                 Active Insurance as of 10/10/2019     Primary Coverage     Payor Plan Insurance Group Employer/Plan Group    ANTHEM MEDICARE REPLACEMENT ANTHEM MEDICARE ADVANTAGE [id number]     Payor Plan Address Payor Plan Phone Number Payor Plan Fax Number Effective Dates    [address] [phone]  1/1/2019 - None Entered    [facility] [id number]       Subscriber Name Subscriber Birth Date Member ID       ANUJ ESCOBEDO 5/24/1925 [id number]                 Emergency Contacts      (Rel.) Home Phone Work Phone Mobile Phone    JOSUE ESCOBEDO (Spouse) [phone] -- --              "

## 2019-10-15 NOTE — PROGRESS NOTES
[facility] Medicine Services Daily Progress Note        Hospitalist Team  LOS 5 days      Patient Care Team:  Devon Arriaza Jr., MD as PCP - General    Assessment / Plan    Dysphagia  Multiple failed attempts at Dobbhoff tube insertion  Failed to place NG tube in IR despite multiple attempts  GI placed PEG tube 10/14   Started on tube feed  Will discontinue  TPN after current bag  is done    Acute kidney injury  Resolved with hydration     Chronic hypotension  Systolic averages 100-110  monitor BP     History of CAD s/p CABG  Followed outpatient by [facility]iology  No active angina      Chronic atrial fibrillation   rate controlled  Discontinue Lovenox and start back on Eliquis    Hyperlipidemia   On atorvastatin     Oral thrush/esophageal candidiasis  On fluconazole and  Liliana's Magic mouthwash     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  On Eliquis  Residual left-sided weakness  Continue baclofen for muscle spasm   Continue PT/OT  Repeat CT no acute findings     BPH  On  Flomax     Depression  on Zoloft     Vitamin D deficiency       History of squamous cell carcinoma     History of MI     VTE prophylaxis-on Eliquis    Plan for disposition  Inpatient rehab    Chief Complaint / Subjective  CC: Dysphagia     Patient seen and examined  No new symptoms  Awaiting rehab placement      Brief Synopsis of Hospital Course/HPI  89-year-old  male with a past medical history of CVA, hyperlipidemia, CAD status post CABG, thrush, vitamin D deficiency, chronic atrial fibrillation, hypertension, and MI who presented to [facility] on 10/10/2019 with complaints of dysphagia.  Per the wife the patient had a stroke on 9/14/2019 and was admitted to [facility].  Patient was then discharged to [facility] and has been there for about 3 weeks.  Patient's wife states that the patient was able to eat some at [facility], however at [facility] he has progressively gotten weaker due to not being able to swallow.  " Patient has had left-sided weakness as a result of the stroke.  Per the family last week patient was given a fourth of a tablet of Prozac and went unresponsive for 48 hours at [facility].  Patient has also lost 9 pounds in 10 days due to getting no nutrition.  He was placed on IV fluids at [facility] but was taken off of them due to them causing peripheral edema.  Denies recent nausea, vomiting, diarrhea, fever, chills.     In the ED, vital signs were stable.  GI was consulted.  Dobbhoff tube was ordered.      ROS:  Review of Systems   All other systems reviewed and are negative.        Family History   Problem Relation Age of Onset   • Atrial fibrillation Sister    • Atrial fibrillation Sister        Past Medical History:   Diagnosis Date   • CAD (coronary artery disease)     s/p CABG 1993 x single vessel   • Chronic atrial fibrillation    • Dyslipidemia    • ED (erectile dysfunction)    • HTN (hypertension)    • Long term current use of anticoagulant therapy    • Myocardial infarction (CMS/HCC)    • Shingles 09/2017   • TIA (transient ischemic attack) 2015       Social History     Socioeconomic History   • Marital status:      Spouse name: Not on file   • Number of children: Not on file   • Years of education: Not on file   • Highest education level: Not on file   Tobacco Use   • Smoking status: Former Smoker     Packs/day: 1.00     Types: Cigarettes   Substance and Sexual Activity   • Alcohol use: No           Objective      Vital Signs  Temp:  [97.3 °F (36.3 °C)-97.7 °F (36.5 °C)] 97.3 °F (36.3 °C)  Heart Rate:  [59-71] 71  Resp:  [12-16] 15  BP: (114-144)/(36-86) 114/86  Oxygen Therapy  SpO2: 96 %  Pulse Oximetry Type: Intermittent  Device (Oxygen Therapy): room air  Flowsheet Rows      First Filed Value   Admission Height  177.8 cm (70\") Documented at 10/10/2019 1145   Admission Weight  70.8 kg (156 lb) Documented at 10/10/2019 1145        Intake & Output (last 3 days)       10/12 0701 - 10/13 0700 10/13 0701 " - 10/14 0700 10/14 0701 - 10/15 0700 10/15 0701 - 10/16 0700    P.O.  0      I.V. (mL/kg) 200 (3)       Other   70     IV Piggyback   100     TPN 1020 1527 1770     Total Intake(mL/kg) 1220 (18.2) 1527 (22) 1940 (28)     Urine (mL/kg/hr) 300 (0.2) 700 (0.4) 200 (0.1)     Total Output 300 700 200     Net +920 +827 +1740             Urine Unmeasured Occurrence 2 x 2 x 1 x         Lines, Drains & Airways    Active LDAs     Name:   Placement date:   Placement time:   Site:   Days:    PICC Double Lumen 10/11/19 Right Basilic   10/11/19    1945    Basilic   1    Peripheral IV 10/06/19 2030 Right Forearm   10/06/19    2030    Forearm   5                  Physical Exam:  General: well-developed and well-nourished, NAD  HEENT: NC/AT, EOMI, PERRLA  Heart: RRR. No murmur   Chest: CTAB, no w/r/r, normal respiratory effort  Abdominal: Soft. NT/ND. Bowel sounds present--PEG tube intact  Musculoskeletal: Normal ROM.  No edema. No calf tenderness.  Neurological: AAOx3, no focal deficits  Skin: Skin is warm and dry. No rash  Psychiatric: Normal mood and affect.      Procedures:  Procedure(s):  ESOPHAGOGASTRODUODENOSCOPY WITH PERCUTANEOUS ENDOSCOPIC GASTROSTOMY TUBE INSERTION    Procedure(s):  ESOPHAGOGASTRODUODENOSCOPY WITH PERCUTANEOUS ENDOSCOPIC GASTROSTOMY TUBE INSERTION  -------------------       Results Review:     I reviewed the patient's new clinical results.    Results from last 7 days   Lab Units 10/15/19  0604 10/14/19  0410 10/13/19  0353 10/12/19  0325 10/11/19  0732 10/10/19  1440 10/09/19  1432   SODIUM mmol/L 145* 146* 149* 153* 151* 148* 145*   POTASSIUM mmol/L 4.0 3.6 3.6 3.5* 3.9 4.6 4.6   CHLORIDE mmol/L 110 112* 112* 116* 114* 110 110   CO2 mmol/L 29.0 29.0 30.0 31.0 27.0 30.0 22.0   BUN mg/dL 24* 23* 25* 32* 32* 29* 29*   CREATININE mg/dL 0.90 0.90 1.00 1.10 1.20 1.30* 1.40*   GLUCOSE mg/dL 117* 118* 126* 113* 79 109* 126*   ALBUMIN g/dL 2.40* 2.30* 2.50* 2.30*  --  3.10*  --    BILIRUBIN mg/dL 2.1* 1.9* 2.5*  1.8*  --  2.6*  --    ALK PHOS U/L 68 67 74 67  --  81  --    AST (SGOT) U/L 29 31 30 28  --  31  --    ALT (SGPT) U/L 19 20 23 18  --  25  --      Cr Clearance Estimated Creatinine Clearance: 49.2 mL/min (by C-G formula based on SCr of 0.9 mg/dL).    Coag   Results from last 7 days   Lab Units 10/14/19  0411 10/10/19  1505   INR  1.25* 1.45*   APTT seconds 30.1  --        HbA1C No results found for: HGBA1C  Blood Glucose   Results from last 7 days   Lab Units 10/13/19  1204 10/13/19  0656 10/13/19  0007 10/12/19  0613   GLUCOSE mg/dL 126* 122* 102 108*       UA          Microbiology       Invalid input(s): PROCALCITONIN    ABG        EKG  ECG/EMG Results (most recent)     None          Imaging:  No radiology results for the last day         Xrays, labs reviewed personally by physician.    Medication Review:   I have reviewed the patient's current medication list    Scheduled Meds    atorvastatin 5 mg Oral Nightly   cholecalciferol 1,000 Units Oral Daily   [START ON 10/16/2019] enoxaparin 70 mg Subcutaneous Q12H   Fat Emulsion Plant Based 250 mL Intravenous Once per day on Sun Tue Thu   fluconazole 200 mg Intravenous Daily   hydrocortisone-diphenhydramine-nystatin 15 mL Swish & Spit 4x Daily   pantoprazole 40 mg Intravenous Q AM   potassium chloride 20 mEq Oral Daily   sertraline 25 mg Oral Nightly   sodium chloride 10 mL Intravenous Q12H   tamsulosin 0.4 mg Oral Nightly       Meds Infusions    Adult [facility]im[facility] TPN  Last Rate: 66 mL/hr at 10/14/19 7285   Pharmacy Consult - Pharmacy to dose     Pharmacy to Dose enoxaparin (LOVENOX)     Pharmacy to Dose TPN     sodium chloride 9 mL/hr    sodium chloride 9 mL/hr Last Rate: 9 mL/hr (10/14/19 4495)       Meds PRN  bisacodyl  •  hydrALAZINE  •  influenza vaccine  •  melatonin  •  Methocarbamol  •  ondansetron **OR** ondansetron  •  Pharmacy Consult - Pharmacy to dose  •  Pharmacy to Dose enoxaparin (LOVENOX)  •  Pharmacy to Dose TPN  •  [DISCONTINUED] potassium  chloride **OR** [DISCONTINUED] potassium chloride **OR** potassium chloride  •  sodium chloride  •  sodium chloride  •  sodium chloride  •  sodium chloride  •  sodium chloride      Hospital problem list:    Dysphagia    Atrial fibrillation, chronic    Dyslipidemia    Degenerative arthritis    Coronary artery disease    Benign prostatic hyperplasia    Hypertension    Long term current use of anticoagulant therapy    Primary malignant neoplasm of head (CMS/HCC)    Oropharyngeal dysphagia    Abrasions of multiple sites        Ramon Freedman MD  10/15/19  9:25 AM

## 2019-10-15 NOTE — PLAN OF CARE
Problem: Patient Care Overview  Goal: Plan of Care Review  Outcome: Ongoing (interventions implemented as appropriate)   10/15/19 0345   OTHER   Outcome Summary pt had g-tube placed on 10/14, feeding trials will begin today around 0930. pt currently still receiving TPN. Pt has had no complaints and appears to have rested well though the night, will continue to monitor pt.      Goal: Individualization and Mutuality  Outcome: Ongoing (interventions implemented as appropriate)    Goal: Discharge Needs Assessment  Outcome: Ongoing (interventions implemented as appropriate)    Goal: Interprofessional Rounds/Family Conf  Outcome: Ongoing (interventions implemented as appropriate)      Problem: Skin Injury Risk (Adult)  Goal: Skin Health and Integrity  Outcome: Ongoing (interventions implemented as appropriate)   10/15/19 0345   Skin Injury Risk (Adult)   Skin Health and Integrity making progress toward outcome       Problem: Dysphagia (Adult)  Goal: Functional/Safe Swallow  Outcome: Ongoing (interventions implemented as appropriate)   10/15/19 0345   Dysphagia (Adult)   Functional/Safe Swallow making progress toward outcome     Goal: Compensatory Techniques to Improve Safety/Function with Swallowing  Outcome: Ongoing (interventions implemented as appropriate)   10/15/19 0345   Dysphagia (Adult)   Compensatory Techniques to Improve Safety/Function with Swallowing making progress toward outcome       Problem: Nutrition, Parenteral (Adult)  Goal: Signs and Symptoms of Listed Potential Problems Will be Absent, Minimized or Managed (Nutrition, Parenteral)  Outcome: Ongoing (interventions implemented as appropriate)   10/15/19 0345   Goal/Outcome Evaluation   Problems Assessed (Parenteral Nutrition) all   Problems Present (Parenteral Nutrition) fluid/electrolyte imbalance;malnutrition

## 2019-10-15 NOTE — PROGRESS NOTES
Nutrition Services    Patient Name:  Anuj Escobedo  YOB: 1925  MRN: [id number]  Admit Date:  10/10/2019    Visually observed tube feed running at 20mL/hr with correct water flush. Observed TPN also infusing. Labs reviewed - Na+ 145 H K+/Phos/Mg wnl. Will continue with follow up as planned and monitor tube feed tolerance. Will advance as medically able.     Electronically signed by:  Delfino Thompson RD  10/15/19 4:01 PM

## 2019-10-16 ENCOUNTER — APPOINTMENT (OUTPATIENT)
Dept: GENERAL RADIOLOGY | Facility: HOSPITAL | Age: 84
End: 2019-10-16

## 2019-10-16 LAB
ALBUMIN SERPL-MCNC: 2.7 G/DL (ref 3.5–5.2)
ALBUMIN/GLOB SERPL: 0.8 G/DL
ALP SERPL-CCNC: 90 U/L (ref 39–117)
ALT SERPL W P-5'-P-CCNC: 15 U/L (ref 1–41)
ANION GAP SERPL CALCULATED.3IONS-SCNC: 10.5 MMOL/L (ref 5–15)
AST SERPL-CCNC: 29 U/L (ref 1–40)
BILIRUB SERPL-MCNC: 1.4 MG/DL (ref 0.2–1.2)
BUN BLD-MCNC: 31 MG/DL (ref 8–23)
BUN/CREAT SERPL: 36.9 (ref 7–25)
CALCIUM SPEC-SCNC: 9.2 MG/DL (ref 8.2–9.6)
CHLORIDE SERPL-SCNC: 107 MMOL/L (ref 98–107)
CO2 SERPL-SCNC: 28 MMOL/L (ref 22–29)
CREAT BLD-MCNC: 0.84 MG/DL (ref 0.76–1.27)
GFR SERPL CREATININE-BSD FRML MDRD: 85 ML/MIN/1.73
GLOBULIN UR ELPH-MCNC: 3.5 GM/DL
GLUCOSE BLD-MCNC: 123 MG/DL (ref 65–99)
POTASSIUM BLD-SCNC: 4.5 MMOL/L (ref 3.5–5.2)
PROT SERPL-MCNC: 6.2 G/DL (ref 6–8.5)
SODIUM BLD-SCNC: 141 MMOL/L (ref 136–145)

## 2019-10-16 PROCEDURE — 74230 X-RAY XM SWLNG FUNCJ C+: CPT

## 2019-10-16 PROCEDURE — 97530 THERAPEUTIC ACTIVITIES: CPT

## 2019-10-16 PROCEDURE — 92526 ORAL FUNCTION THERAPY: CPT

## 2019-10-16 PROCEDURE — 25010000002 FLUCONAZOLE PER 200 MG: Performed by: INTERNAL MEDICINE

## 2019-10-16 PROCEDURE — 99232 SBSQ HOSP IP/OBS MODERATE 35: CPT | Performed by: INTERNAL MEDICINE

## 2019-10-16 PROCEDURE — 92611 MOTION FLUOROSCOPY/SWALLOW: CPT

## 2019-10-16 PROCEDURE — 80053 COMPREHEN METABOLIC PANEL: CPT | Performed by: INTERNAL MEDICINE

## 2019-10-16 PROCEDURE — 97110 THERAPEUTIC EXERCISES: CPT

## 2019-10-16 RX ADMIN — BARIUM SULFATE 50 ML: 400 SUSPENSION ORAL at 15:30

## 2019-10-16 RX ADMIN — NYSTATIN 15 ML: 100000 SUSPENSION ORAL at 17:29

## 2019-10-16 RX ADMIN — NYSTATIN 15 ML: 100000 SUSPENSION ORAL at 11:48

## 2019-10-16 RX ADMIN — ATORVASTATIN CALCIUM 5 MG: 10 TABLET, FILM COATED ORAL at 22:03

## 2019-10-16 RX ADMIN — Medication 10 ML: at 10:30

## 2019-10-16 RX ADMIN — NYSTATIN 15 ML: 100000 SUSPENSION ORAL at 10:00

## 2019-10-16 RX ADMIN — MELATONIN 1000 UNITS: at 10:00

## 2019-10-16 RX ADMIN — POTASSIUM CHLORIDE 20 MEQ: 1.5 POWDER, FOR SOLUTION ORAL at 10:00

## 2019-10-16 RX ADMIN — NYSTATIN 15 ML: 100000 SUSPENSION ORAL at 22:06

## 2019-10-16 RX ADMIN — TAMSULOSIN HYDROCHLORIDE 0.4 MG: 0.4 CAPSULE ORAL at 22:03

## 2019-10-16 RX ADMIN — LANSOPRAZOLE 30 MG: KIT at 06:13

## 2019-10-16 RX ADMIN — APIXABAN 5 MG: 5 TABLET, FILM COATED ORAL at 09:47

## 2019-10-16 RX ADMIN — Medication 10 ML: at 22:06

## 2019-10-16 RX ADMIN — BARIUM SULFATE 135 ML: 980 POWDER, FOR SUSPENSION ORAL at 15:30

## 2019-10-16 RX ADMIN — SERTRALINE HYDROCHLORIDE 25 MG: 50 TABLET ORAL at 22:04

## 2019-10-16 RX ADMIN — APIXABAN 5 MG: 5 TABLET, FILM COATED ORAL at 22:01

## 2019-10-16 RX ADMIN — FLUCONAZOLE, SODIUM CHLORIDE 200 MG: 2 INJECTION INTRAVENOUS at 11:44

## 2019-10-16 NOTE — THERAPY TREATMENT NOTE
Patient Name: Anuj Escobedo  : 1925    MRN: [id number]                              Today's Date: 10/16/2019       Admit Date: 10/10/2019    Visit Dx:     ICD-10-CM ICD-9-CM   1. Dysphagia, unspecified type R13.10 787.20   2. Oropharyngeal dysphagia R13.12 787.22     Patient Active Problem List   Diagnosis   • Dysphagia   • Atrial fibrillation, chronic   • Dyslipidemia   • Degenerative arthritis   • Coronary artery disease   • Benign prostatic hyperplasia   • Hypertension   • Long term current use of anticoagulant therapy   • Mixed hyperlipidemia   • Peripheral edema   • Primary malignant neoplasm of head (CMS/HCC)   • Shingles   • Oropharyngeal dysphagia   • Abrasions of multiple sites     Past Medical History:   Diagnosis Date   • CAD (coronary artery disease)     s/p CABG  x single vessel   • Chronic atrial fibrillation    • Dyslipidemia    • ED (erectile dysfunction)    • HTN (hypertension)    • Long term current use of anticoagulant therapy    • Myocardial infarction (CMS/HCC)    • Shingles 2017   • TIA (transient ischemic attack)      Past Surgical History:   Procedure Laterality Date   • CARDIAC CATHETERIZATION  1993    RCA with subtotal occlusion with residual thrombus formation at very proximal portion; Lad first diagonal branch with proximal 50% stenosis   • CARPAL TUNNEL RELEASE     • CORONARY ARTERY BYPASS GRAFT  1993    single coronary artery bypass using saphenous vein to the right coronary artery   • ENDOSCOPY W/ PEG TUBE PLACEMENT N/A 10/14/2019    Procedure: ESOPHAGOGASTRODUODENOSCOPY WITH PERCUTANEOUS ENDOSCOPIC GASTROSTOMY TUBE INSERTION;  Surgeon: Cathryn Glynn MD;  Location: [facility];  Service: Gastroenterology   • INGUINAL HERNIA REPAIR Right 2015    (has had total of 3 hernia repairs)   • ORTHOPEDIC SURGERY       General Information     Row Name 10/16/19 1533          PT Evaluation Time/Intention    Document Type  therapy note (daily note)  -SC     Mode  of Treatment  physical therapy;individual therapy  -SC     Row Name 10/16/19 1533          Cognitive Assessment/Intervention- PT/OT    Orientation Status (Cognition)  oriented x 4  -SC     Row Name 10/16/19 1531          Safety Issues, Functional Mobility    Impairments Affecting Function (Mobility)  balance;coordination;endurance/activity tolerance;muscle tone abnormal;strength;postural/trunk control  -SC     Comment, Safety Issues/Impairments (Mobility)  cva affected left side  -SC       User Key  (r) = Recorded By, (t) = Taken By, (c) = Cosigned By    Initials Name Provider Type    Indira Grover PTA Physical Therapy Assistant        Mobility     Row Name 10/16/19 1531          Bed Mobility Assessment/Treatment    Bed Mobility Assessment/Treatment  supine-sit  -SC     Supine-Sit [place] (Bed Mobility)  maximum assist (25% patient effort);1 person to manage equipment  -SC     Sit-Supine [place] (Bed Mobility)  maximum assist (25% patient effort);2 person assist  -SC     Assistive Device (Bed Mobility)  draw sheet;head of bed elevated  -SC     Comment (Bed Mobility)  posterior lean, also unable to manage feet on the floor,  pt kept pushing forward on feet.    -SC     Row Name 10/16/19 1536          Transfer Assessment/Treatment    Comment (Transfers)  pt sat at eob to work on sitting balance then transport came to take pt for video swallow.  no attempt to transfer pt out of the bed  -SC       User Key  (r) = Recorded By, (t) = Taken By, (c) = Cosigned By    Initials Name Provider Type    Indira Grover PTA Physical Therapy Assistant        Obj/Interventions     Row Name 10/16/19 9259          Therapeutic Exercise    Lower Extremity (Therapeutic Exercise)  heel slides, bilateral;LAQ (long arc quad), bilateral;quad sets, bilateral  -SC     Lower Extremity Range of Motion (Therapeutic Exercise)  hip abduction/adduction, bilateral  -SC     Exercise Type (Therapeutic Exercise)  AAROM (active  assistive range of motion);AROM (active range of motion)  -SC     Position (Therapeutic Exercise)  supine;seated  -SC     Sets/Reps (Therapeutic Exercise)  1/10  -SC     Row Name 10/16/19 1548          Static Sitting Balance    Level of [place] (Unsupported Sitting, Static Balance)  moderate assist, 50 to 74% patient effort  -SC     Sitting Position (Unsupported Sitting, Static Balance)  sitting on edge of bed posterior lean  -SC     Time Able to Maintain Position (Unsupported Sitting, Static Balance)  more than 5 minutes;other (see comments) about 10 minutes  -SC     Comment (Unsupported Sitting, Static Balance)  worked on trunk stretching forward, and laterally (left and right).  was able to grade off to min assist and cga 3 times briefly  -SC     Row Name 10/16/19 8196          Dynamic Sitting Balance    Level of [place], Reaches Outside Midline (Sitting, Dynamic Balance)  moderate assist, 50 to 74% patient effort  -SC     Sitting Position, Reaches Outside Midline (Sitting, Dynamic Balance)  sitting on edge of bed  -SC     Row Name 10/16/19 7384          Static Standing Balance    Comment (Supported Standing, Static Balance)  not tested  -SC       User Key  (r) = Recorded By, (t) = Taken By, (c) = Cosigned By    Initials Name Provider Type    Indira Grover, CAMERON Physical Therapy Assistant        Goals/Plan    No documentation.       Clinical Impression     Row Name 10/16/19 7859          Pain Assessment    Additional Documentation  Pain Scale: FACES Pre/Post-Treatment (Group)  -SC     Row Name 10/16/19 0938          Pain Scale: FACES Pre/Post-Treatment    Pain: FACES Scale, Pretreatment  0-->no hurt  -SC     Pain: FACES Scale, Post-Treatment  0-->no hurt  -SC       User Key  (r) = Recorded By, (t) = Taken By, (c) = Cosigned By    Initials Name Provider Type    Indira Grover, CAMERON Physical Therapy Assistant        Outcome Measures     Row Name 10/16/19 7918          How much help from another  person do you currently need...    Turning from your back to your side while in flat bed without using bedrails?  1  -SC     Moving from lying on back to sitting on the side of a flat bed without bedrails?  1  -SC     Moving to and from a bed to a chair (including a wheelchair)?  1  -SC     Standing up from a chair using your arms (e.g., wheelchair, bedside chair)?  1  -SC     Climbing 3-5 steps with a railing?  1  -SC     To walk in hospital room?  1  -SC     AM-PAC 6 Clicks Score (PT)  6  -SC     Row Name 10/16/19 1549          Modified [place] Scale    Modified [place] Scale  5 - Severe disability.  Bedridden, incontinent, and requiring constant nursing care and attention.  -SC     Row Name 10/16/19 1549          Functional Assessment    Outcome Measure Options  AM-PAC 6 Clicks Basic Mobility (PT)  -SC       User Key  (r) = Recorded By, (t) = Taken By, (c) = Cosigned By    Initials Name Provider Type    Indira Grover, PTA Physical Therapy Assistant        Physical Therapy Education     Title: PT OT SLP Therapies (Done)     Topic: Physical Therapy (Done)     Point: Mobility training (Done)     Learning Progress Summary           Patient Acceptance, E,TB, VU by SC at 10/16/2019  3:45 PM    Acceptance, E, VU by  at 10/16/2019  3:33 PM    Acceptance, E,TB, VU,NR by CM at 10/14/2019  1:33 PM    Comment:  encouraged exhaling w/ movement to decrease pain/discomfort    Acceptance, E,TB, VU,NR by  at 10/12/2019  2:43 PM    Acceptance, E,TB, VU by CM at 10/11/2019 12:27 PM   Family Acceptance, E, VU by  at 10/16/2019  3:33 PM    Acceptance, E, VU by  at 10/13/2019  1:21 PM    Acceptance, E,TB, VU by CM at 10/11/2019 12:27 PM                   Point: Home exercise program (Done)     Learning Progress Summary           Patient Acceptance, E,TB, VU by SC at 10/16/2019  3:45 PM    Acceptance, E, VU by  at 10/16/2019  3:33 PM    Acceptance, E,TB, VU,NR by  at 10/12/2019  2:43 PM   Family Acceptance, E, VU by   at 10/16/2019  3:33 PM    Acceptance, E, VU by LS at 10/13/2019  1:21 PM                   Point: Body mechanics (Done)     Learning Progress Summary           Patient Acceptance, E,TB, VU by SC at 10/16/2019  3:45 PM    Acceptance, E, VU by RH at 10/16/2019  3:33 PM    Acceptance, E,TB, VU,NR by CM at 10/14/2019  1:33 PM    Comment:  encouraged exhaling w/ movement to decrease pain/discomfort    Acceptance, E,TB, VU,NR by  at 10/12/2019  2:43 PM    Acceptance, E,TB, VU by CM at 10/11/2019 12:27 PM   Family Acceptance, E, VU by RH at 10/16/2019  3:33 PM    Acceptance, E, VU by LS at 10/13/2019  1:21 PM    Acceptance, E,TB, VU by CM at 10/11/2019 12:27 PM                   Point: Precautions (Done)     Learning Progress Summary           Patient Acceptance, E,TB, VU by SC at 10/16/2019  3:45 PM    Acceptance, E, VU by RH at 10/16/2019  3:33 PM    Acceptance, E,TB, VU,NR by CM at 10/14/2019  1:33 PM    Comment:  encouraged exhaling w/ movement to decrease pain/discomfort    Acceptance, E,TB, VU,NR by  at 10/12/2019  2:43 PM    Acceptance, E,TB, VU by CM at 10/11/2019 12:27 PM   Family Acceptance, E, VU by RH at 10/16/2019  3:33 PM    Acceptance, E, VU by LS at 10/13/2019  1:21 PM    Acceptance, E,TB, VU by CM at 10/11/2019 12:27 PM                               User Key     Initials Effective Dates Name Provider Type Discipline     03/01/19 -  Iris Kelley, PT Physical Therapist PT    SC 03/01/19 -  Indira Vaughn, PTA Physical Therapy Assistant PT     03/01/19 -  Yuly Mike PTA Physical Therapy Assistant PT     03/04/19 -  Ivana Wellington RN Registered Nurse Nurse     03/01/19 -  Jo Ann Harper RN Registered Nurse Nurse              PT Recommendation and Plan     Plan of Care Reviewed With: patient  Progress: improving  Outcome Summary: pt making slow progress toward goals.  pt still with significant weakness and immobility but has potential to cont to improve with physical therapy  services. recommend IP rehab at d/c      Time Calculation:   PT Charges     Row Name 10/16/19 1550             Time Calculation    Start Time  1325  -SC      Stop Time  1345  -SC      Time Calculation (min)  20 min  -SC      PT Received On  10/16/19  -SC      PT - Next Appointment  10/18/19  -SC         Time Calculation- PT    Total Timed Code Minutes- PT  20 minute(s)  -SC         Timed Charges    71938 - PT Therapeutic Exercise Minutes  8  -SC      92384 - PT Therapeutic Activity Minutes  12  -SC        User Key  (r) = Recorded By, (t) = Taken By, (c) = Cosigned By    Initials Name Provider Type    SC Indira Vaughn PTA Physical Therapy Assistant        Therapy Charges for Today     Code Description Service Date Service Provider Modifiers Qty    89935225525 HC PT THER PROC EA 15 MIN 10/16/2019 Indira Vaughn PTA GP 1    53659107092 HC PT THERAPEUTIC ACT EA 15 MIN 10/16/2019 Indira Vaughn PTA GP 1          PT G-Codes  Outcome Measure Options: AM-PAC 6 Clicks Basic Mobility (PT)  AM-PAC 6 Clicks Score (PT): 6  Modified [place] Scale: 5 - Severe disability.  Bedridden, incontinent, and requiring constant nursing care and attention.    Indira Vaughn PTA  10/16/2019

## 2019-10-16 NOTE — PLAN OF CARE
Problem: Patient Care Overview  Goal: Plan of Care Review  Outcome: Ongoing (interventions implemented as appropriate)      Problem: Skin Injury Risk (Adult)  Goal: Skin Health and Integrity  Outcome: Ongoing (interventions implemented as appropriate)      Problem: Dysphagia (Adult)  Goal: Functional/Safe Swallow  Outcome: Ongoing (interventions implemented as appropriate)      Problem: Nutrition, Parenteral (Adult)  Goal: Signs and Symptoms of Listed Potential Problems Will be Absent, Minimized or Managed (Nutrition, Parenteral)  Outcome: Ongoing (interventions implemented as appropriate)

## 2019-10-16 NOTE — PROGRESS NOTES
[facility] Medicine Services Daily Progress Note        Hospitalist Team  LOS 6 days      Patient Care Team:  Devon Arriaza Jr., MD as PCP - General    Assessment / Plan    Dysphagia  Multiple failed attempts at Dobbhoff tube insertion  Failed to place NG tube in IR despite multiple attempts  GI placed PEG tube 10/14   on tube feed  TPN has been discontinued    Acute kidney injury  Resolved with hydration     Chronic hypotension  Systolic averages 100-110  monitor BP     History of CAD s/p CABG  Followed outpatient by [facility]  No active angina      Chronic atrial fibrillation   rate controlled  Discontinue Lovenox and start back on Eliquis    Hyperlipidemia   On atorvastatin     Oral thrush/esophageal candidiasis  On fluconazole and  Liliana's Magic mouthwash     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  On Eliquis  Residual left-sided weakness  Continue baclofen for muscle spasm   Continue PT/OT  Repeat CT no acute findings     BPH  On  Flomax     Depression  on Zoloft     Vitamin D deficiency       History of squamous cell carcinoma     History of MI     VTE prophylaxis-on Eliquis    Plan for disposition  Inpatient rehab    Chief Complaint / Subjective  CC: Dysphagia     Patient seen and examined  No new symptoms  Awaiting rehab placement      Brief Synopsis of Hospital Course/HPI  89-year-old  male with a past medical history of CVA, hyperlipidemia, CAD status post CABG, thrush, vitamin D deficiency, chronic atrial fibrillation, hypertension, and MI who presented to [facility] on 10/10/2019 with complaints of dysphagia.  Per the wife the patient had a stroke on 9/14/2019 and was admitted to [facility].  Patient was then discharged to [facility] and has been there for about 3 weeks.  Patient's wife states that the patient was able to eat some at [facility], however at [facility] he has progressively gotten weaker due to not being able to swallow.  Patient has had left-sided  "weakness as a result of the stroke.  Per the family last week patient was given a fourth of a tablet of Prozac and went unresponsive for 48 hours at [facility].  Patient has also lost 9 pounds in 10 days due to getting no nutrition.  He was placed on IV fluids at [facility] but was taken off of them due to them causing peripheral edema.  Denies recent nausea, vomiting, diarrhea, fever, chills.     In the ED, vital signs were stable.  GI was consulted.  Dobbhoff tube was ordered.      ROS:  Review of Systems   Constitutional: Negative for chills and fever.   HENT: Negative for congestion.    Respiratory: Negative for chest tightness.    Cardiovascular: Negative for chest pain.   Gastrointestinal: Negative for nausea.   Neurological: Positive for weakness.         Family History   Problem Relation Age of Onset   • Atrial fibrillation Sister    • Atrial fibrillation Sister        Past Medical History:   Diagnosis Date   • CAD (coronary artery disease)     s/p CABG 1993 x single vessel   • Chronic atrial fibrillation    • Dyslipidemia    • ED (erectile dysfunction)    • HTN (hypertension)    • Long term current use of anticoagulant therapy    • Myocardial infarction (CMS/HCC)    • Shingles 09/2017   • TIA (transient ischemic attack) 2015       Social History     Socioeconomic History   • Marital status:      Spouse name: Not on file   • Number of children: Not on file   • Years of education: Not on file   • Highest education level: Not on file   Tobacco Use   • Smoking status: Former Smoker     Packs/day: 1.00     Types: Cigarettes   Substance and Sexual Activity   • Alcohol use: No           Objective      Vital Signs  Temp:  [98.7 °F (37.1 °C)-98.8 °F (37.1 °C)] 98.8 °F (37.1 °C)  Heart Rate:  [67-74] 67  Resp:  [14-18] 18  BP: ()/(61-74) 90/61  Oxygen Therapy  SpO2: 96 %  Pulse Oximetry Type: Intermittent  Device (Oxygen Therapy): room air  Flowsheet Rows      First Filed Value   Admission Height  177.8 cm (70\") " Documented at 10/10/2019 1145   Admission Weight  70.8 kg (156 lb) Documented at 10/10/2019 1145        Intake & Output (last 3 days)       10/13 0701 - 10/14 0700 10/14 0701 - 10/15 0700 10/15 0701 - 10/16 0700 10/16 0701 - 10/17 0700    P.O. 0       I.V. (mL/kg)        Other  70 40     IV Piggyback  100      TPN 1527 1770      Total Intake(mL/kg) 1527 (22) 1940 (28) 40 (0.6)     Urine (mL/kg/hr) 700 (0.4) 200 (0.1) 100 (0.1)     Total Output 700 200 100     Net +827 +1740 -60             Urine Unmeasured Occurrence 2 x 1 x  1 x        Lines, Drains & Airways    Active LDAs     Name:   Placement date:   Placement time:   Site:   Days:    PICC Double Lumen 10/11/19 Right Basilic   10/11/19    1945    Basilic   1    Peripheral IV 10/06/19 2030 Right Forearm   10/06/19    2030    Forearm   5                  Physical Exam:  General: well-developed and well-nourished, NAD  HEENT: NC/AT, EOMI, PERRLA  Heart: RRR. No murmur   Chest: CTAB, no w/r/r, normal respiratory effort  Abdominal: Soft. NT/ND. Bowel sounds present--PEG tube intact  Musculoskeletal: Normal ROM.  No edema. No calf tenderness.  Neurological: AAOx3, no focal deficits  Skin: Skin is warm and dry. No rash  Psychiatric: Normal mood and affect.      Procedures:  Procedure(s):  ESOPHAGOGASTRODUODENOSCOPY WITH PERCUTANEOUS ENDOSCOPIC GASTROSTOMY TUBE INSERTION    Procedure(s):  ESOPHAGOGASTRODUODENOSCOPY WITH PERCUTANEOUS ENDOSCOPIC GASTROSTOMY TUBE INSERTION  -------------------       Results Review:     I reviewed the patient's new clinical results.    Results from last 7 days   Lab Units 10/16/19  0635 10/15/19  0604 10/14/19  0410 10/13/19  0353 10/12/19  0325 10/11/19  0732 10/10/19  1440   SODIUM mmol/L 141 145* 146* 149* 153* 151* 148*   POTASSIUM mmol/L 4.5 4.0 3.6 3.6 3.5* 3.9 4.6   CHLORIDE mmol/L 107 110 112* 112* 116* 114* 110   CO2 mmol/L 28.0 29.0 29.0 30.0 31.0 27.0 30.0   BUN mg/dL 31* 24* 23* 25* 32* 32* 29*   CREATININE mg/dL 0.84 0.90 0.90  1.00 1.10 1.20 1.30*   GLUCOSE mg/dL 123* 117* 118* 126* 113* 79 109*   ALBUMIN g/dL 2.70* 2.40* 2.30* 2.50* 2.30*  --  3.10*   BILIRUBIN mg/dL 1.4* 2.1* 1.9* 2.5* 1.8*  --  2.6*   ALK PHOS U/L 90 68 67 74 67  --  81   AST (SGOT) U/L 29 29 31 30 28  --  31   ALT (SGPT) U/L 15 19 20 23 18  --  25     Cr Clearance Estimated Creatinine Clearance: 52.7 mL/min (by C-G formula based on SCr of 0.84 mg/dL).    Coag   Results from last 7 days   Lab Units 10/14/19  0411 10/10/19  1505   INR  1.25* 1.45*   APTT seconds 30.1  --        HbA1C No results found for: HGBA1C  Blood Glucose   Results from last 7 days   Lab Units 10/13/19  1204 10/13/19  0656 10/13/19  0007 10/12/19  0613   GLUCOSE mg/dL 126* 122* 102 108*       UA          Microbiology       Invalid input(s): PROCALCITONIN    ABG        EKG  ECG/EMG Results (most recent)     None          Imaging:  No radiology results for the last day         Xrays, labs reviewed personally by physician.    Medication Review:   I have reviewed the patient's current medication list    Scheduled Meds    apixaban 5 mg Oral Q12H   atorvastatin 5 mg Oral Nightly   cholecalciferol 1,000 Units Oral Daily   fluconazole 200 mg Intravenous Daily   hydrocortisone-diphenhydramine-nystatin 15 mL Swish & Spit 4x Daily   lansoprazole 30 mg Per G Tube QAM   potassium chloride 20 mEq Per PEG Tube Daily   sertraline 25 mg Oral Nightly   sodium chloride 10 mL Intravenous Q12H   tamsulosin 0.4 mg Oral Nightly       Meds Infusions    Pharmacy Consult - Pharmacy to dose     sodium chloride 9 mL/hr    sodium chloride 9 mL/hr Last Rate: 9 mL/hr (10/14/19 0846)       Meds PRN  bisacodyl  •  hydrALAZINE  •  influenza vaccine  •  melatonin  •  Methocarbamol  •  ondansetron **OR** ondansetron  •  Pharmacy Consult - Pharmacy to dose  •  [DISCONTINUED] potassium chloride **OR** [DISCONTINUED] potassium chloride **OR** potassium chloride  •  sodium chloride  •  sodium chloride  •  sodium chloride  •  sodium  chloride  •  sodium chloride      Hospital problem list:    Dysphagia    Atrial fibrillation, chronic    Dyslipidemia    Degenerative arthritis    Coronary artery disease    Benign prostatic hyperplasia    Hypertension    Long term current use of anticoagulant therapy    Primary malignant neoplasm of head (CMS/HCC)    Oropharyngeal dysphagia    Abrasions of multiple sites        Ramon Freedman MD  10/16/19  9:40 AM

## 2019-10-16 NOTE — PLAN OF CARE
Problem: Patient Care Overview  Goal: Plan of Care Review  Outcome: Ongoing (interventions implemented as appropriate)   10/16/19 8123   Coping/Psychosocial   Plan of Care Reviewed With patient;spouse   OTHER   Outcome Summary Pt had VFSS completed today to assess pharyngeal phase of swallowing. Pt frankly aspirated thin liquids with a delayed ineffective cough. Pt had severely delayed initiation of the swallow with prolonged tongue pumping on each consistency. Due to pt's history of dysphagia, prolonged manipulation of bolus/increased time initiating the swallow, and ineffective airway response to aspiration. Pt demonstrates risk of malnutrition and aspiration pneumonia. It is recommended pt remain primarily NPO with tube feeds as source of nutrition at this time. ST will follow to target dysphagia & incorporate therapeutic trials puree & NTL by spoon (IN THERAPY ONLY). Pt appropriate for modified Johnson Water Protocol (FWP) with water by spoon via nursing staff & trained caregivers (I.e. Wife), following thorough oral care.

## 2019-10-16 NOTE — PROGRESS NOTES
Continued Stay Note  DENNIS Churchill     Patient Name: Anuj Escobedo  MRN: [id number]  Today's Date: 10/16/2019    Admit Date: 10/10/2019    Discharge Plan     Row Name 10/16/19 1546       Plan    Plan  Referrals  made to [facility] and [facility] [place],decisions pending,needs ROS[facility] bullard approved    Patient/Family in Agreement with Plan  yes      Sw met with pt and spouse to inform them that [facility] had accepted and they stated they no longer wanted to go to go there. Dtr requested referrals to [facility] and [facility],referrals made             [facility], [place]  [phone]

## 2019-10-16 NOTE — THERAPY EVALUATION
Acute Care - Speech Language Pathology   Swallow Initial Evaluation [facility]     Patient Name: Anuj Escobedo  : 1925  MRN: [id number]  Today's Date: 10/16/2019               Admit Date: 10/10/2019    Visit Dx:     ICD-10-CM ICD-9-CM   1. Dysphagia, unspecified type R13.10 787.20   2. Oropharyngeal dysphagia R13.12 787.22     Patient Active Problem List   Diagnosis   • Dysphagia   • Atrial fibrillation, chronic   • Dyslipidemia   • Degenerative arthritis   • Coronary artery disease   • Benign prostatic hyperplasia   • Hypertension   • Long term current use of anticoagulant therapy   • Mixed hyperlipidemia   • Peripheral edema   • Primary malignant neoplasm of head (CMS/HCC)   • Shingles   • Oropharyngeal dysphagia   • Abrasions of multiple sites     Past Medical History:   Diagnosis Date   • CAD (coronary artery disease)     s/p CABG  x single vessel   • Chronic atrial fibrillation    • Dyslipidemia    • ED (erectile dysfunction)    • HTN (hypertension)    • Long term current use of anticoagulant therapy    • Myocardial infarction (CMS/HCC)    • Shingles 2017   • TIA (transient ischemic attack)      Past Surgical History:   Procedure Laterality Date   • CARDIAC CATHETERIZATION  1993    RCA with subtotal occlusion with residual thrombus formation at very proximal portion; Lad first diagonal branch with proximal 50% stenosis   • CARPAL TUNNEL RELEASE     • CORONARY ARTERY BYPASS GRAFT  1993    single coronary artery bypass using saphenous vein to the right coronary artery   • ENDOSCOPY W/ PEG TUBE PLACEMENT N/A 10/14/2019    Procedure: ESOPHAGOGASTRODUODENOSCOPY WITH PERCUTANEOUS ENDOSCOPIC GASTROSTOMY TUBE INSERTION;  Surgeon: Cathryn Glynn MD;  Location: [facility];  Service: Gastroenterology   • INGUINAL HERNIA REPAIR Right 2015    (has had total of 3 hernia repairs)   • ORTHOPEDIC SURGERY          SWALLOW EVALUATION (last 72 hours)      SLP Adult Swallow Evaluation     Row  Name 10/16/19 1400          Document Type  re-evaluation, evaluation  (Pended)   -HC    Patient Effort  good  (Pended)   -HC    Comment  Pt re-assessed clinically this date to re-establish care s/p PEG placement & to determine timeline/readiness for VFSS. Pt seen in room with wife present, easily alerts to verbal cues. Pt provided with ice chip x1, water by tsp x5. Pt with significantly prolonged oral phase with ice chip, functional with water by tsp. Pt demonstrated palpable laryngeal elevation with intermittent coughing episodes both immediately & poorly correlated with swallow. Pt appears appropriate to proceed with VFSS this date to objectively evaluate swallow function in the setting of known h/o dysphagia & overt s/s aspiration. See VFSS results below.  (Pended)   -HC          Pain Scale: Numbers, Pretreatment  0/10 - no pain  (Pended)   -HC    Pain Scale: Numbers, Post-Treatment  0/10 - no pain  (Pended)   -HC          Dentition Assessment  other (see comments)  (Pended)  Pt has natural dentention with partial plate unavailable   -HC          Utensils Used  spoon;cup;straw  (Pended)   -HC    Consistencies Trialed  pureed;mechanical soft, no mixed consistencies;thin liquids;nectar/syrup-thick liquids;honey-thick liquids  (Pended)   -HC          Oral Prep Phase  impaired oral phase of swallowing  (Pended)   -HC    Oral Transit Phase  impaired  (Pended)   -HC    Oral Residue  WFL  (Pended)   -    VFSS Summary  Pt had VFSS completed today. Pt was fed by SLP while positioned at 90 degree angle in lateral projection. Study initated with honey thick liquids by spoon x2, followed by applesauce by spoon x2, a single peach by spoon x1, nectar thick liquid by spoon x2, thin by straw x1, and finished with nectar by straw x1. Pt exhibited tongue pumping on all consistencies with severely delayed initiation of the swallow. Pt needed to be prompted to swallow on multiple trials of the study however unclear if swallow  response due to cues. Initial trial of HTL with timed 42 second delay of swallow initiation. Pt had premature spillage into the valleculae of thin juice component from mech soft/mixed and to pyriforms on honey thick liquids. Pt had remaining residue in valleculae and pyriforms on all consistencies but was able to clear some residue with spontaneous subsequent swallow. Pt exhibited severely prolonged mastication of single peach with tongue pumping and initation of the swallow taking 1 minute and 16 seconds. On trials of honey thick liquids and applesauce there was trace visible reflux flow through the CP, resting on posterior pharyngeal wall after the swallow. Pt frankly aspirated thin liquids before the swallow with a severely delayed and ineffective cough response. Pt has prominent CP. Other anatomical deviations/findings included calcification extending from under side of epiglottis down anterior tracheal wall, this was observed prior to any PO trials administered and was unchanged throughout exam with radiologist labeling this as calcification.  Pt also appears to demonstrate asymmetrical pyriforms which are most prominently noticed with BA spillage & residual.  Due to pt's history of dysphagia, prolonged manipulation of bolus/increased time initiating the swallow, and ineffective airway response to aspiration,  Pt demonstrates risk of malnutrition and aspiration pneumonia. It is recommended pt remain primarily NPO with tube feeds as source of nutrition at this time. ST will follow to target dysphagia & incorporate therapeutic trials puree & NTL by spoon (IN THERAPY ONLY). Pt appropriate for modified Johnson Water Protocol (FWP) with water by spoon via nursing staff & trained caregivers (I.e. Wife), following thorough oral care.     (Pended)   -HC                  Oral Preparatory Phase  oral holding;prolonged manipulation  (Pended)   -HC    Oral Holding  all consistencies tested  (Pended)   -HC    Prolonged  Manipulation  all consistencies tested  (Pended)   -HC          Impaired Oral Transit Phase  tongue pumping;increased A-P transit time;premature spillage of liquids into pharynx  (Pended)   -HC    Increased A-P Transit Time  honey-thick liquids;pudding/puree;mechanical soft  (Pended)   -HC    Tongue Pumping  all consistencies tested  (Pended)   -HC    Premature Spillage of Liquids into Pharynx  all consistencies tested  (Pended)   -HC          Initiation of Pharyngeal Swallow  bolus in valleculae;bolus in pyriform sinuses  (Pended)   -HC    Pharyngeal Phase  impaired pharyngeal phase of swallowing  (Pended)   -HC    Aspiration Before the Swallow  thin liquids  (Pended)   -HC    Response to Aspiration  cough;other (see comments);could not clear subglottic material  (Pended)  Very delayed cough response  -HC    Pharyngeal Residue  all consistencies tested;valleculae;pyriform sinuses  (Pended)   -HC    Response to Residue  other (see comments)  (Pended)  clears partially with spontaneous subsequent swallow  -HC          Consistencies Aspirated/Penetrated  thin liquids;aspirated  (Pended)   -HC          Therapy Frequency (Swallow)  daily  (Pended)   -    Predicted Duration Therapy Intervention (Days)  until discharge  (Pended)   -    SLP Diet Recommendation  NPO;water between meals after oral care, with supervision;temporary alternate methods of nutrition/hydration  (Pended)   -    Recommended Diagnostics  reassess via VFSS (MBS)  (Pended)   -    SLP Rec. for Method of Medication Administration  meds via alternate route  (Pended)   -    Monitor for Signs of Aspiration  yes;notify SLP if any concerns regarding water protocol  (Pended)   -    Anticipated Dischage Disposition  skilled nursing facility  (Pended)   -          Oral Nutrition/Hydration Goal Selection (SLP)  oral nutrition/hydration, SLP goal 1;oral nutrition/hydration, SLP goal 2;oral nutrition/hydration, SLP goal (free text)  (Pended)   -           Oral Nutrition/Hydration Goal 1, SLP  Pt will participate in a VFSS to further assess pharyngeal stage of swallow.  (Pended)   -HC    Barriers (Oral Nutrition/Hydration Goal 1, SLP)  Pt had VFSS completed today to assess functional of swallow. See details.   (Pended)   -HC    Progress/Outcomes (Oral Nutrition/Hydration Goal 1, SLP)  goal met  (Pended)   -HC          Oral Nutrition/Hydration Goal 2, SLP  Establish PO diet  (Pended)   -HC    Barriers (Oral Nutrition/Hydration Goal 2, SLP)  Pt had VFSS completed today. Still not appropriate for full PO diet, will complete PO trials at bedside wt ST.   (Pended)   -HC    Progress/Outcomes (Oral Nutrition/Hydration Goal 2, SLP)  continuing progress toward goal  (Pended)   -HC          Oral Nutrition/Hydration Goal, SLP  Pt will tolerate trials of puree and NTL at bedside.   (Pended)   -HC    Time Frame (Oral Nutrition/Hydration Goal, SLP)  by discharge  (Pended)   -HC      User Key  (r) = Recorded By, (t) = Taken By, (c) = Cosigned By    Initials Name Effective Dates     Gillian Edward, Speech Therapy Student 08/14/19 -           EDUCATION  The patient has been educated in the following areas:   Dysphagia (Swallowing Impairment) NPO rationale.    SLP Recommendation and Plan  SLP Diet Recommendation: (P) NPO, water between meals after oral care, with supervision, temporary alternate methods of nutrition/hydration  SLP Rec. for Method of Medication Administration: (P) meds via alternate route  Monitor for Signs of Aspiration: (P) yes, notify SLP if any concerns  Recommended Diagnostics: (P) reassess via VFSS (MBS)  Anticipated Dischage Disposition: (P) skilled nursing facility  Therapy Frequency (Swallow): (P) daily  Predicted Duration Therapy Intervention (Days): (P) until discharge   ST will continue to follow while in house to target dysphagia & for therapeutic trials of puree and NTL.  Pt is safe to be on the Johnson Water Protocol with ice chips and/or  water by spoon after oral care is done and with nursing staff full supervision.        SLP GOALS     Row Name 10/16/19 1400          Oral Nutrition/Hydration Goal 1, SLP  Pt will participate in a VFSS to further assess pharyngeal stage of swallow.  (Pended)   -HC    Barriers (Oral Nutrition/Hydration Goal 1, SLP)  Pt had VFSS completed today to assess functional of swallow. See details.   (Pended)   -HC    Progress/Outcomes (Oral Nutrition/Hydration Goal 1, SLP)  goal met  (Pended)   -HC          Oral Nutrition/Hydration Goal 2, SLP  Establish PO diet  (Pended)   -HC    Barriers (Oral Nutrition/Hydration Goal 2, SLP)  Pt had VFSS completed today. Not appropriate for full PO diet, will complete PO trials at bedside wtih ST, repeat VFSS in 1-3 weeks following rehab at next level of care.   (Pended)   -HC    Progress/Outcomes (Oral Nutrition/Hydration Goal 2, SLP)  continuing progress toward goal  (Pended)   -HC          Oral Nutrition/Hydration Goal, SLP  Pt will tolerate trials of puree and NTL at bedside with no s/s aspiration.   (Pended)   -HC    Time Frame (Oral Nutrition/Hydration Goal, SLP)  by discharge  (Pended)   -HC      User Key  (r) = Recorded By, (t) = Taken By, (c) = Cosigned By    Initials Name Provider Type    HC Gillian Edward, Speech Therapy Student Speech and Language Pathologist               Gillian Edward, Speech Therapy Student  10/16/2019

## 2019-10-16 NOTE — DISCHARGE PLACEMENT REQUEST
"Rita Escobedoos RUSSELL (89 y.o. Male)     Date of Birth Social Security Number Address Home Phone MRN    05/24/1925  [address] [address] [phone] [id number]    [employer] Marital Status          None        Admission Date Admission Type Admitting Provider Attending Provider Department, Room/Bed    10/10/19 Emergency Ramon Freedman MD Ozor, Uchenna, MD [facility] 3A MEDICAL INPATIENT, 310/1    Discharge Date Discharge Disposition Discharge Destination                       Attending Provider:  Ramon Freedman MD    Allergies:  Celecoxib, Ibuprofen    Isolation:  None   Infection:  None   Code Status:  CPR    Ht:  177.8 cm (70\")   Wt:  69.3 kg (152 lb 12.5 oz)    Admission Cmt:  None   Principal Problem:  Dysphagia [R13.10]                 Active Insurance as of 10/10/2019     Primary Coverage     Payor Plan Insurance Group Employer/Plan Group    ANTHEM MEDICARE REPLACEMENT ANTHEM MEDICARE ADVANTAGE [id number]     Payor Plan Address Payor Plan Phone Number Payor Plan Fax Number Effective Dates    [address] [phone]  1/1/2019 - None Entered    [facility] [id number]       Subscriber Name Subscriber Birth Date Member ID       ANUJ ESCOBEDO 5/24/1925 [id number]                 Emergency Contacts      (Rel.) Home Phone Work Phone Mobile Phone    JOSUE ESCOBEDO (Spouse) [phone] -- --            "

## 2019-10-16 NOTE — PROGRESS NOTES
Discharge Planning Assessment   [facility]     Patient Name: Anuj Escobedo  MRN: [id number]  Today's Date: 10/16/2019    Admit Date: 10/10/2019        Discharge Plan     Row Name 10/16/19 1240       Plan    Plan  sebas accepted,PASRR approved,precert started 10/16    Patient/Family in Agreement with Plan  yes        Destination - Selection Complete      Service Provider Request Status Selected Services Address Phone Number Fax Number    [facility] [place] [facility] [address] [place] [address] [place] [address] [phone]    [facility] Declined  [facility] available bed [facility]A [address] [facility] [place] [address] [phone]       Stephanie ROSADO Jordan 10/14/2019 1314    Pt is a return to [facility],pending PT notes and bed availability,needs precert no PASRR needed [facility] reports they do not have a bed for pt currently               Katie Jordan, [facility], LSW  [phone]

## 2019-10-16 NOTE — PLAN OF CARE
Problem: Patient Care Overview  Goal: Plan of Care Review  Outcome: Ongoing (interventions implemented as appropriate)   10/16/19 2602   Coping/Psychosocial   Plan of Care Reviewed With patient   Plan of Care Review   Progress improving   OTHER   Outcome Summary pt making slow progress toward goals. pt still with significant weakness and immobility but has potential to cont to improve with physical therapy services. recommend IP rehab at d/c

## 2019-10-16 NOTE — PROGRESS NOTES
"Adult Nutrition  Assessment/PES    Patient Name:  Anuj Escobedo  YOB: 1925  MRN: [id number]  Admit Date:  10/10/2019    Assessment Date:  10/16/2019    Comments:  Received 4 days of TPN. Tube feed now running at initial goal of 55mL/hr. Advancing to end goal today.    Goal: Nutren 1.5 at 75mL/hr + 40mL water flush q1hr. Monitor for s/s of refeeding syndrome and replace electrolytes as needed.      Reason for Assessment                Reason for Assessment    Reason For Assessment Follow up per protocol.    TF/PN       Diagnosis PMH: CVA, hyperlipidemia, CAD status post CABG, thrush, vitamin D deficiency, chronic atrial fibrillation, hypertension, and MI      Current Dx: Admit c dysphagia. Possible AMANDA. Oral Thrush. PEG placed 10/14. Multiple attempts to place DHT 10/11 but failed. MD offered TPN until PEG can be placed. TPN started 10/11.         Nutrition/Diet History                Nutrition/Diet History    Typical Food/Fluid Intake 10/11: D/W family intake and stated had not been able to eat anything in the last 9-10 days but had not really been eaten well prior. Stroke 4 weeks ago. Confirmed weight loss.      Food Allergies No known food allergies.          Anthropometrics             Anthropometrics    Height  177.8 cm (70\")    Weight 69.3 kg (152 lb)   10/14/19 - no new wt since this date       Admit Weight    Admit Weight  70.8 kg (156 lb)   10/10/19       Ideal Body Weight (IBW)    Ideal Body Weight (IBW) (kg)  76 kg    % Ideal Body Weight  91%        Usual Body Weight (UBW)    Usual Body Weight  81.6 kg (180 lb) - per family    % Usual Body Weight  85.8%    Weight Loss Weight loss of 10.4% in 6 months - severe    Weight History  Wt hx:   173.5 lb (6/19),  172 lb (4/19),  180 lb (3/19),  179.8 (1/19),   182.4 (12/18),   181 lb (10/18),   180 lb (9/18)       Body Mass Index (BMI)    BMI (kg/m2)  21.9        Labs/Tests/Procedures/Meds                Labs/Procedures/Meds    Lab Results Comments " "Na+/K+ wnl glucose 123 BUN 31 H crt wnl (no new Mg/Phos)        Medications    Pertinent Medications Comments Diflucan,  Liliana's magic mouthwash, KCl         Physical Findings                Physical Findings    Overall Physical Appearance 10/16: Patient continues to appear frail. NFPE has been completed.    10/14: Patient continues to appear frail. NFPE completed at initial assessment.     10/11: Appears Frail, NFPE completed 10/11/19; See MSA     Gastrointestinal No BM x6 days - RN aware     Tubes PEG placed 10/14     Oral/Mouth Cavity Dysphagia     Skin Left leg abrasion, right gluteal ST, Right gluteal Abrasion          Estimated/Assessed Needs              Calculation Measurements    Weight Used For Calculations 69.3 kg (152 lb)  --    Height 177.8 cm (70\") -       Estimated/Assessed Needs    Additional Documentation -  --       Calorie Requirements    Weight Used For Calorie Calculations  69.3 kg (152 lb)  --    Estimated Calorie Need Method 35 kcals/kg   --    Estimated Calorie Requirement Comment 2425 kcals  --       Protein Requirements    Weight Used For Protein Calculations 69.3 kg (152 lb)  --    Est Protein Requirement Amount (gms/kg) 1.5 gm protein  --    Estimated Protein Requirements (gms/day) 103 g  --       Fluid Requirements    Estimated Fluid Requirements (mL/day) 2425 mL (using 1mL/kcal, adjust based on hydration)  --              Nutrition Prescription Ordered                Nutrition Prescription PO    Current PO Diet  NPO        Nutrition Prescription EN    Enteral Route PEG     Product Nutren 1.5     Modulars -     TF Delivery Method Continuous     Continuous TF Goal Rate (mL/hr) 55mL/hr - Visually observed tube feed running at goal with correct water flush     Water flush (mL)  40mL     Water Flush Frequency Q1hr         Evaluation of Received Nutrient/Fluid Intake                PO Evaluation    % PO Intake 10/16: 0% - on tube feeds    10/14: 0% - on TPN, tube feed to start 10/15        EN " Evaluation    TF Changes Increasing tube feed to end goal rate of 75mL/hr     TF Residual WNL     TF Tolerance No s/s of intolerance           Malnutrition Severity Assessment     Row Name 10/11/19 0849          Malnutrition Severity Assessment    Malnutrition Type  Chronic Disease - Related Malnutrition        Insufficient Energy Intake     Insufficient Energy Intake   <75% of est. energy requirement for > or equal to 1 month Per family pt has not been eating well for awhile and has had trouble eating since the stroke 9/14/19        Unintentional Weight Loss     Unintentional Weight Loss   Weight loss greater than 10% in six months Weight loss of 104% x 6 months- weight 4/19- 172 lbs and current body weight 154.5 (10/10/19)        Muscle Loss    Loss of Muscle Mass Findings  Severe     Religious Region  Severe - deep hollowing/scooping, lack of muscle to touch, facial bones well defined     Clavicle Bone Region  Severe - protruding prominent bone     Acromion Bone Region  Severe - squared shoulders, bones, and acromion process protrusion prominent     Scapular Bone Region  Severe - prominent bones, depressions easily visible between ribs, scapula, spine, shoulders     Dorsal Hand Region  None     Patellar Region  Moderate - patella more prominent, less muscle definition around patella     Anterior Thigh Region  Moderate - mild depression on inner thigh     Posterior Calf Region  Moderate - some roundness, slight firmness        Fat Loss    Subcutaneous Fat Loss Findings  Severe     Orbital Region   Severe - pronounced hollowness/depression, dark circles, loose saggy skin     Upper Arm Region  Severe - mostly skin, very little space between folds, fingers touch     Thoracic & Lumbar Region  -- did not obtain d/t positioning.         Criteria Met (Must meet criteria for severity in at least 2 of these categories: M Wasting, Fat Loss, Fluid, Secondary Signs, Wt. Status, Intake)    Patient meets criteria for   Severe  Malnutrition           Problem/Interventions:  Problem 1                Nutrition Diagnoses Problem 1    Problem 1 Severe chronic malnutrition      Etiology (related to) physiological causes (stroke, MI)     Signs/Symptoms (evidenced by) severe muscle wasting, severe fat loss, < 75% of est energy requirement for >/= 1 month, and 10.4% weight loss x 6 months.                  Intervention Goal                Intervention Goal    General Tube feed initiation and transition from TPN    Tube feed tolerance         Nutrition Intervention                Nutrition Intervention    RD/Tech Action Continue tube feed and advance as tolerated         Nutrition Prescription                Nutrition Prescription EN    Enteral Prescription NPO + Tube feed     TPN stopped 10/14. Received 4 days.    Initial Goal: Nutren 1.5 at 55mL/hr + 40mL water flush q1hr    End goal: Nutren 1.5 at 75mL/hr + 40mL water flush q1hr provides 2475kcals (102%), 112gPRO (108%), 2134mL free water         Education/Evaluation                Monitor/Evaluation    Monitor TF tolerance, labs, BM, weight, transition from TPN to TF           Electronically signed by:  Delfino Thompson RD  10/16/19 3:29 PM

## 2019-10-16 NOTE — PLAN OF CARE
Problem: Patient Care Overview  Goal: Plan of Care Review  Outcome: Ongoing (interventions implemented as appropriate)   10/15/19 1335 10/15/19 1935   Coping/Psychosocial   Plan of Care Reviewed With --  patient   Plan of Care Review   Progress no change --    OTHER   Outcome Summary Pt still undernourished & having difficulty w/ anesthesia effects S/P PEG placement. He was at an acute rehab walking 500' with assist of 2 but requiring continued dependent assist for ADL per family report. He now is requiring fully dependent assist for all bed mobility & ADL. Pt will benefit from return to IP rehab since despite his advanced age he was (I) with driving, ADL, & home management prior to his recent CVA. --      Goal: Individualization and Mutuality  Outcome: Ongoing (interventions implemented as appropriate)    Goal: Discharge Needs Assessment  Outcome: Ongoing (interventions implemented as appropriate)   10/11/19 1521 10/13/19 1322   Discharge Needs Assessment   Readmission Within the Last 30 Days no previous admission in last 30 days --    Concerns to be Addressed --  decision making   Patient/Family Anticipates Transition to --  inpatient rehabilitation facility   Patient/Family Anticipated Services at Transition --     Transportation Concerns --  car, none   Transportation Anticipated --  family or friend will provide   Anticipated Changes Related to Illness --  inability to care for self   Equipment Needed After Discharge none --    Discharge Facility/Level of Care Needs --  rehabilitation facility   Current Discharge Risk --  dependent with mobility/activities of daily living   Disability   Equipment Currently Used at Home --  walker, branden;hospital bed  (rehab facility)     Goal: Interprofessional Rounds/Family Conf   10/14/19 1216   Interdisciplinary Rounds/Family Conf   Participants ;family;nursing;pharmacy;social work/services

## 2019-10-17 LAB
ALBUMIN SERPL-MCNC: 2.3 G/DL (ref 3.5–5.2)
ALBUMIN/GLOB SERPL: 0.7 G/DL
ALP SERPL-CCNC: 99 U/L (ref 39–117)
ALT SERPL W P-5'-P-CCNC: 13 U/L (ref 1–41)
ANION GAP SERPL CALCULATED.3IONS-SCNC: 9.4 MMOL/L (ref 5–15)
AST SERPL-CCNC: 28 U/L (ref 1–40)
BILIRUB SERPL-MCNC: 1 MG/DL (ref 0.2–1.2)
BUN BLD-MCNC: 31 MG/DL (ref 8–23)
BUN/CREAT SERPL: 40.8 (ref 7–25)
CALCIUM SPEC-SCNC: 8.6 MG/DL (ref 8.2–9.6)
CHLORIDE SERPL-SCNC: 105 MMOL/L (ref 98–107)
CO2 SERPL-SCNC: 28 MMOL/L (ref 22–29)
CREAT BLD-MCNC: 0.76 MG/DL (ref 0.76–1.27)
GFR SERPL CREATININE-BSD FRML MDRD: 96 ML/MIN/1.73
GLOBULIN UR ELPH-MCNC: 3.2 GM/DL
GLUCOSE BLD-MCNC: 127 MG/DL (ref 65–99)
GLUCOSE BLDC GLUCOMTR-MCNC: 138 MG/DL (ref 70–105)
POTASSIUM BLD-SCNC: 4.4 MMOL/L (ref 3.5–5.2)
PROT SERPL-MCNC: 5.5 G/DL (ref 6–8.5)
SODIUM BLD-SCNC: 138 MMOL/L (ref 136–145)

## 2019-10-17 PROCEDURE — 80053 COMPREHEN METABOLIC PANEL: CPT | Performed by: INTERNAL MEDICINE

## 2019-10-17 PROCEDURE — 82962 GLUCOSE BLOOD TEST: CPT

## 2019-10-17 PROCEDURE — 99232 SBSQ HOSP IP/OBS MODERATE 35: CPT | Performed by: INTERNAL MEDICINE

## 2019-10-17 PROCEDURE — 25010000002 FLUCONAZOLE PER 200 MG: Performed by: INTERNAL MEDICINE

## 2019-10-17 RX ORDER — ECHINACEA PURPUREA EXTRACT 125 MG
2 TABLET ORAL AS NEEDED
Status: DISCONTINUED | OUTPATIENT
Start: 2019-10-17 | End: 2019-10-21 | Stop reason: HOSPADM

## 2019-10-17 RX ADMIN — Medication 10 ML: at 10:54

## 2019-10-17 RX ADMIN — TAMSULOSIN HYDROCHLORIDE 0.4 MG: 0.4 CAPSULE ORAL at 20:08

## 2019-10-17 RX ADMIN — ATORVASTATIN CALCIUM 5 MG: 10 TABLET, FILM COATED ORAL at 20:09

## 2019-10-17 RX ADMIN — POTASSIUM CHLORIDE 20 MEQ: 1.5 POWDER, FOR SOLUTION ORAL at 10:52

## 2019-10-17 RX ADMIN — SERTRALINE HYDROCHLORIDE 25 MG: 50 TABLET ORAL at 20:09

## 2019-10-17 RX ADMIN — NYSTATIN 15 ML: 100000 SUSPENSION ORAL at 10:51

## 2019-10-17 RX ADMIN — SALINE NASAL SPRAY 2 SPRAY: 1.5 SOLUTION NASAL at 20:29

## 2019-10-17 RX ADMIN — Medication 10 ML: at 20:10

## 2019-10-17 RX ADMIN — NYSTATIN 15 ML: 100000 SUSPENSION ORAL at 20:10

## 2019-10-17 RX ADMIN — FLUCONAZOLE, SODIUM CHLORIDE 200 MG: 2 INJECTION INTRAVENOUS at 11:00

## 2019-10-17 RX ADMIN — LANSOPRAZOLE 30 MG: KIT at 06:08

## 2019-10-17 RX ADMIN — MELATONIN 1000 UNITS: at 10:52

## 2019-10-17 RX ADMIN — APIXABAN 5 MG: 5 TABLET, FILM COATED ORAL at 10:52

## 2019-10-17 RX ADMIN — APIXABAN 5 MG: 5 TABLET, FILM COATED ORAL at 20:10

## 2019-10-17 NOTE — PROGRESS NOTES
[facility] Daily Progress Note        Hospitalist Team  LOS 7 days      Patient Care Team:  Devon Arriaza Jr., MD as PCP - General    Assessment / Plan    Dysphagia  Multiple failed attempts at Dobbhoff tube insertion  Failed to place NG tube in IR despite multiple attempts  GI placed PEG tube 10/14   on tube feed and  has reached goal  Water flushes decreased as hypernatremia has resolved  TPN has been discontinued    Acute kidney injury  Resolved with hydration     Chronic hypotension  Systolic averages 100-110  monitor BP     History of CAD s/p CABG  Followed outpatient by 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  On Eliquis  Residual left-sided weakness  Continue baclofen for muscle spasm   Continue PT/OT  Repeat CT no acute findings     BPH  On  Flomax     Depression  on Zoloft     Vitamin D deficiency       History of squamous cell carcinoma     History of MI     VTE prophylaxis-on Eliquis    Plan for disposition  Inpatient rehab    Chief Complaint / Subjective  CC: Dysphagia     Patient seen and examined  No new symptoms  Awaiting rehab placement      Brief Synopsis of Hospital Course/HPI  89-year-old  male with a past medical history of CVA, hyperlipidemia, CAD status post CABG, thrush, vitamin D deficiency, chronic atrial fibrillation, hypertension, and MI who presented to [facility] on 10/10/2019 with complaints of dysphagia.  Per the wife the patient had a stroke on 9/14/2019 and was admitted to [facility].  Patient was then discharged to [facility] and has been there for about 3 weeks.  Patient's wife states that the patient was able to eat some at [facility], however at [facility] he has progressively  gotten weaker due to not being able to swallow.  Patient has had left-sided weakness as a result of the stroke.  Per the family last week patient was given a fourth of a tablet of Prozac and went unresponsive for 48 hours at [facility].  Patient has also lost 9 pounds in 10 days due to getting no nutrition.  He was placed on IV fluids at [facility] but was taken off of them due to them causing peripheral edema.  Denies recent nausea, vomiting, diarrhea, fever, chills.     In the ED, vital signs were stable.  GI was consulted.  Dobbhoff tube was ordered.      ROS:  Review of Systems   Constitutional: Negative for chills and fever.   HENT: Negative for congestion.    Respiratory: Negative for chest tightness.    Cardiovascular: Negative for chest pain.   Gastrointestinal: Negative for nausea.   Neurological: Positive for weakness.   All other systems reviewed and are negative.        Family History   Problem Relation Age of Onset   • Atrial fibrillation Sister    • Atrial fibrillation Sister        Past Medical History:   Diagnosis Date   • CAD (coronary artery disease)     s/p CABG 1993 x single vessel   • Chronic atrial fibrillation    • Dyslipidemia    • ED (erectile dysfunction)    • HTN (hypertension)    • Long term current use of anticoagulant therapy    • Myocardial infarction (CMS/HCC)    • Shingles 09/2017   • TIA (transient ischemic attack) 2015       Social History     Socioeconomic History   • Marital status:      Spouse name: Not on file   • Number of children: Not on file   • Years of education: Not on file   • Highest education level: Not on file   Tobacco Use   • Smoking status: Former Smoker     Packs/day: 1.00     Types: Cigarettes   Substance and Sexual Activity   • Alcohol use: No           Objective      Vital Signs  Temp:  [97.6 °F (36.4 °C)-98.1 °F (36.7 °C)] 98.1 °F (36.7 °C)  Heart Rate:  [65-70] 68  Resp:  [15-19] 15  BP: (113-162)/(64-81) 113/64  Oxygen Therapy  SpO2: 96 %  Pulse Oximetry  "Type: Intermittent  Device (Oxygen Therapy): room air  Flowsheet Rows      First Filed Value   Admission Height  177.8 cm (70\") Documented at 10/10/2019 1145   Admission Weight  70.8 kg (156 lb) Documented at 10/10/2019 1145        Intake & Output (last 3 days)       10/14 0701 - 10/15 0700 10/15 0701 - 10/16 0700 10/16 0701 - 10/17 0700 10/17 0701 - 10/18 0700    P.O.        I.V. (mL/kg)   346 (5)     Other 70 40 861     NG/GT   1144     IV Piggyback 100       TPN 1770       Total Intake(mL/kg) 1940 (28) 40 (0.6) 2351 (33.9)     Urine (mL/kg/hr) 200 (0.1) 100 (0.1) 410 (0.2)     Total Output 200 100 410     Net +1740 -60 +1941             Urine Unmeasured Occurrence 1 x  3 x         Lines, Drains & Airways    Active LDAs     Name:   Placement date:   Placement time:   Site:   Days:    PICC Double Lumen 10/11/19 Right Basilic   10/11/19    1945    Basilic   1    Peripheral IV 10/06/19 2030 Right Forearm   10/06/19    2030    Forearm   5                  Physical Exam:  General: well-developed and well-nourished, NAD  HEENT: NC/AT, EOMI, PERRLA  Heart: RRR. No murmur   Chest: CTAB, no w/r/r, normal respiratory effort  Abdominal: Soft. NT/ND. Bowel sounds present--PEG tube intact  Musculoskeletal: Normal ROM.  No edema. No calf tenderness.  Neurological: AAOx3, no focal deficits  Skin: Skin is warm and dry. No rash  Psychiatric: Normal mood and affect.      Procedures:  Procedure(s):  ESOPHAGOGASTRODUODENOSCOPY WITH PERCUTANEOUS ENDOSCOPIC GASTROSTOMY TUBE INSERTION    Procedure(s):  ESOPHAGOGASTRODUODENOSCOPY WITH PERCUTANEOUS ENDOSCOPIC GASTROSTOMY TUBE INSERTION  -------------------       Results Review:     I reviewed the patient's new clinical results.    Results from last 7 days   Lab Units 10/17/19  0456 10/16/19  0635 10/15/19  0604 10/14/19  0410 10/13/19  0353 10/12/19  0325 10/11/19  0732 10/10/19  1440   SODIUM mmol/L 138 141 145* 146* 149* 153* 151* 148*   POTASSIUM mmol/L 4.4 4.5 4.0 3.6 3.6 3.5* 3.9 4.6 "   CHLORIDE mmol/L 105 107 110 112* 112* 116* 114* 110   CO2 mmol/L 28.0 28.0 29.0 29.0 30.0 31.0 27.0 30.0   BUN mg/dL 31* 31* 24* 23* 25* 32* 32* 29*   CREATININE mg/dL 0.76 0.84 0.90 0.90 1.00 1.10 1.20 1.30*   GLUCOSE mg/dL 127* 123* 117* 118* 126* 113* 79 109*   ALBUMIN g/dL 2.30* 2.70* 2.40* 2.30* 2.50* 2.30*  --  3.10*   BILIRUBIN mg/dL 1.0 1.4* 2.1* 1.9* 2.5* 1.8*  --  2.6*   ALK PHOS U/L 99 90 68 67 74 67  --  81   AST (SGOT) U/L 28 29 29 31 30 28  --  31   ALT (SGPT) U/L 13 15 19 20 23 18  --  25     Cr Clearance Estimated Creatinine Clearance: 55.3 mL/min (by C-G formula based on SCr of 0.76 mg/dL).    Coag   Results from last 7 days   Lab Units 10/14/19  0411 10/10/19  1505   INR  1.25* 1.45*   APTT seconds 30.1  --        HbA1C No results found for: HGBA1C  Blood Glucose   Results from last 7 days   Lab Units 10/13/19  1204 10/13/19  0656 10/13/19  0007 10/12/19  0613   GLUCOSE mg/dL 126* 122* 102 108*       UA          Microbiology       Invalid input(s): PROCALCITONIN    ABG        EKG  ECG/EMG Results (most recent)     None          Imaging:  Fl Video Swallow With Speech    Result Date: 10/16/2019  As above. Please see speech pathology report for detailed evaluation and dietary recommendations.  Electronically Signed By-Antonio Pearson On:10/16/2019 3:01 PM This report was finalized on [id number] by  Antonio Pearson, .           Xrays, labs reviewed personally by physician.    Medication Review:   I have reviewed the patient's current medication list    Scheduled Meds    apixaban 5 mg Oral Q12H   atorvastatin 5 mg Oral Nightly   cholecalciferol 1,000 Units Oral Daily   fluconazole 200 mg Intravenous Daily   hydrocortisone-diphenhydramine-nystatin 15 mL Swish & Spit 4x Daily   lansoprazole 30 mg Per G Tube QAM   potassium chloride 20 mEq Per PEG Tube Daily   sertraline 25 mg Oral Nightly   sodium chloride 10 mL Intravenous Q12H   tamsulosin 0.4 mg Oral Nightly       Meds Infusions    sodium  chloride 9 mL/hr    sodium chloride 9 mL/hr Last Rate: 9 mL/hr (10/14/19 0846)       Meds PRN  bisacodyl  •  hydrALAZINE  •  influenza vaccine  •  melatonin  •  Methocarbamol  •  ondansetron **OR** ondansetron  •  [DISCONTINUED] potassium chloride **OR** [DISCONTINUED] potassium chloride **OR** potassium chloride  •  sodium chloride  •  sodium chloride  •  sodium chloride  •  sodium chloride  •  sodium chloride      Hospital problem list:    Dysphagia    Atrial fibrillation, chronic    Dyslipidemia    Degenerative arthritis    Coronary artery disease    Benign prostatic hyperplasia    Hypertension    Long term current use of anticoagulant therapy    Primary malignant neoplasm of head (CMS/HCC)    Oropharyngeal dysphagia    Abrasions of multiple sites        Ramon Freedman MD  10/17/19  9:10 AM

## 2019-10-17 NOTE — PROGRESS NOTES
Discharge Planning Assessment  [facility]     Patient Name: Anuj Escobedo  MRN: [id number]  Today's Date: 10/17/2019    Admit Date: 10/10/2019           Discharge Plan     Row Name 10/17/19 1020       Plan    Plan  [facility]i [facility] accepted,Precert started 10/17,PASRR approved    Patient/Family in Agreement with Plan  yes        Destination      Service Provider Request Status Selected Services Address Phone Number Fax Number    [facility] Accepted N/A [address] [place] [phone] [phone]       [facility] 10/17/2019 1020    Accepted,precert started 10/17,PASRR approved               [facility] Declined  no available bed N/A [address] [place] [address] [phone]       [facility] 10/14/2019 1314    Pt is a return to [facility],pending PT notes and bed availability,needs precert no PASRR needed               [facility] [place] Declined  family chose other facility N/A [address] [facility] [place] [address] [phone]    [facility] Declined N/A [address] [place] [address] [phone]       [facility] 10/17/2019 1004    Out of network with ins                 Katie [facility], [facility], LSW  [phone]

## 2019-10-17 NOTE — PROGRESS NOTES
Continued Stay Note  [facility]     Patient Name: Anuj Escobedo  MRN: [id number]  Today's Date: 10/17/2019    Admit Date: 10/10/2019    Discharge Plan     Row Name 10/17/19 1545       Plan    Plan  Deena Butler accepted, precert started 10/17, ROSETTE approved.     Plan Comments  Precert started 10/17, per JAYJAY Wilson, [facility] Butler doesn't do [id number]. Patient with G-tube feed adjustments today.             Expected Discharge Date and Time     Expected Discharge Date Expected Discharge Time    Oct 18, 2019           Tonya Maddox  [phone]

## 2019-10-17 NOTE — PROGRESS NOTES
Nutrition Services    Patient Name:  Anuj Escobedo  YOB: 1925  MRN: [id number]  Admit Date:  10/10/2019    Patient tolerating tube feed at goal rate. Decreased water flush d/t hypernatremia resolved. Changed to 20mL water flush q1hr. Will continue with follow up as planned.     Electronically signed by:  Delfino Thompson RD  10/17/19 3:06 PM

## 2019-10-17 NOTE — PLAN OF CARE
Problem: Patient Care Overview  Goal: Plan of Care Review  Outcome: Ongoing (interventions implemented as appropriate)   10/17/19 0420   Coping/Psychosocial   Plan of Care Reviewed With patient   Plan of Care Review   Progress improving   OTHER   Outcome Summary Patient has rested well through the night. Has tolerated tube feeds well. No complaints at this time.       Problem: Skin Injury Risk (Adult)  Goal: Skin Health and Integrity  Outcome: Ongoing (interventions implemented as appropriate)   10/17/19 0420   Skin Injury Risk (Adult)   Skin Health and Integrity making progress toward outcome       Problem: Dysphagia (Adult)  Goal: Functional/Safe Swallow  Outcome: Ongoing (interventions implemented as appropriate)   10/17/19 0420   Dysphagia (Adult)   Functional/Safe Swallow making progress toward outcome     Goal: Compensatory Techniques to Improve Safety/Function with Swallowing  Outcome: Ongoing (interventions implemented as appropriate)   10/17/19 0420   Dysphagia (Adult)   Compensatory Techniques to Improve Safety/Function with Swallowing making progress toward outcome       Problem: Nutrition, Parenteral (Adult)  Goal: Signs and Symptoms of Listed Potential Problems Will be Absent, Minimized or Managed (Nutrition, Parenteral)   10/17/19 0420   Goal/Outcome Evaluation   Problems Assessed (Parenteral Nutrition) all   Problems Present (Parenteral Nutrition) malnutrition;fluid/electrolyte imbalance

## 2019-10-18 LAB
ALBUMIN SERPL-MCNC: 2.3 G/DL (ref 3.5–5.2)
ALBUMIN/GLOB SERPL: 0.7 G/DL
ALP SERPL-CCNC: 122 U/L (ref 39–117)
ALT SERPL W P-5'-P-CCNC: 13 U/L (ref 1–41)
ANION GAP SERPL CALCULATED.3IONS-SCNC: 4 MMOL/L (ref 5–15)
AST SERPL-CCNC: 26 U/L (ref 1–40)
BILIRUB SERPL-MCNC: 1 MG/DL (ref 0.2–1.2)
BUN BLD-MCNC: 27 MG/DL (ref 8–23)
BUN/CREAT SERPL: 33.8 (ref 7–25)
CALCIUM SPEC-SCNC: 8.7 MG/DL (ref 8.2–9.6)
CHLORIDE SERPL-SCNC: 103 MMOL/L (ref 98–107)
CO2 SERPL-SCNC: 29 MMOL/L (ref 22–29)
CREAT BLD-MCNC: 0.8 MG/DL (ref 0.76–1.27)
GFR SERPL CREATININE-BSD FRML MDRD: 90 ML/MIN/1.73
GLOBULIN UR ELPH-MCNC: 3.5 GM/DL
GLUCOSE BLD-MCNC: 129 MG/DL (ref 65–99)
POTASSIUM BLD-SCNC: 4.4 MMOL/L (ref 3.5–5.2)
PROT SERPL-MCNC: 5.8 G/DL (ref 6–8.5)
SODIUM BLD-SCNC: 136 MMOL/L (ref 136–145)

## 2019-10-18 PROCEDURE — 25010000002 FLUCONAZOLE PER 200 MG: Performed by: INTERNAL MEDICINE

## 2019-10-18 PROCEDURE — 80053 COMPREHEN METABOLIC PANEL: CPT | Performed by: INTERNAL MEDICINE

## 2019-10-18 PROCEDURE — 97112 NEUROMUSCULAR REEDUCATION: CPT

## 2019-10-18 PROCEDURE — 99232 SBSQ HOSP IP/OBS MODERATE 35: CPT | Performed by: INTERNAL MEDICINE

## 2019-10-18 PROCEDURE — 92526 ORAL FUNCTION THERAPY: CPT

## 2019-10-18 PROCEDURE — 97535 SELF CARE MNGMENT TRAINING: CPT

## 2019-10-18 PROCEDURE — 97530 THERAPEUTIC ACTIVITIES: CPT

## 2019-10-18 RX ADMIN — POTASSIUM CHLORIDE 20 MEQ: 1.5 POWDER, FOR SOLUTION ORAL at 10:07

## 2019-10-18 RX ADMIN — NYSTATIN 15 ML: 100000 SUSPENSION ORAL at 12:24

## 2019-10-18 RX ADMIN — APIXABAN 5 MG: 5 TABLET, FILM COATED ORAL at 22:43

## 2019-10-18 RX ADMIN — NYSTATIN 15 ML: 100000 SUSPENSION ORAL at 10:07

## 2019-10-18 RX ADMIN — NYSTATIN 15 ML: 100000 SUSPENSION ORAL at 23:26

## 2019-10-18 RX ADMIN — FLUCONAZOLE, SODIUM CHLORIDE 200 MG: 2 INJECTION INTRAVENOUS at 12:24

## 2019-10-18 RX ADMIN — SERTRALINE HYDROCHLORIDE 25 MG: 50 TABLET ORAL at 22:43

## 2019-10-18 RX ADMIN — SALINE NASAL SPRAY 2 SPRAY: 1.5 SOLUTION NASAL at 10:06

## 2019-10-18 RX ADMIN — ATORVASTATIN CALCIUM 5 MG: 10 TABLET, FILM COATED ORAL at 22:43

## 2019-10-18 RX ADMIN — APIXABAN 5 MG: 5 TABLET, FILM COATED ORAL at 10:07

## 2019-10-18 RX ADMIN — Medication 10 ML: at 10:07

## 2019-10-18 RX ADMIN — MELATONIN 1000 UNITS: at 10:07

## 2019-10-18 RX ADMIN — Medication 10 ML: at 22:43

## 2019-10-18 RX ADMIN — LANSOPRAZOLE 30 MG: KIT at 06:16

## 2019-10-18 NOTE — PLAN OF CARE
Problem: Patient Care Overview  Goal: Plan of Care Review   10/18/19 8852   Coping/Psychosocial   Plan of Care Reviewed With patient;spouse   Plan of Care Review   Progress no change   OTHER   Outcome Summary Pt is very motivated to improve s/p CVA nearly 2 months ago. he still has moderate LUE/LLE spasticity & now has a PEG with dysphagia, all of which severly limit his function & mobility. He was previously (I) in all I/ADL. He will need to return to rehab at d/c.

## 2019-10-18 NOTE — THERAPY TREATMENT NOTE
Acute Care - Occupational Therapy Treatment Note  [facility]     Patient Name: Anuj Escobedo  : 1925  MRN: [id number]  Today's Date: 10/18/2019             Admit Date: 10/10/2019       ICD-10-CM ICD-9-CM   1. Dysphagia, unspecified type R13.10 787.20   2. Oropharyngeal dysphagia R13.12 787.22     Patient Active Problem List   Diagnosis   • Dysphagia   • Atrial fibrillation, chronic   • Dyslipidemia   • Degenerative arthritis   • Coronary artery disease   • Benign prostatic hyperplasia   • Hypertension   • Long term current use of anticoagulant therapy   • Mixed hyperlipidemia   • Peripheral edema   • Primary malignant neoplasm of head (CMS/HCC)   • Shingles   • Oropharyngeal dysphagia   • Abrasions of multiple sites     Past Medical History:   Diagnosis Date   • CAD (coronary artery disease)     s/p CABG  x single vessel   • Chronic atrial fibrillation    • Dyslipidemia    • ED (erectile dysfunction)    • HTN (hypertension)    • Long term current use of anticoagulant therapy    • Myocardial infarction (CMS/HCC)    • Shingles 2017   • TIA (transient ischemic attack)      Past Surgical History:   Procedure Laterality Date   • CARDIAC CATHETERIZATION  1993    RCA with subtotal occlusion with residual thrombus formation at very proximal portion; Lad first diagonal branch with proximal 50% stenosis   • CARPAL TUNNEL RELEASE     • CORONARY ARTERY BYPASS GRAFT  1993    single coronary artery bypass using saphenous vein to the right coronary artery   • ENDOSCOPY W/ PEG TUBE PLACEMENT N/A 10/14/2019    Procedure: ESOPHAGOGASTRODUODENOSCOPY WITH PERCUTANEOUS ENDOSCOPIC GASTROSTOMY TUBE INSERTION;  Surgeon: Cathryn Glynn MD;  Location: [facility];  Service: Gastroenterology   • INGUINAL HERNIA REPAIR Right 2015    (has had total of 3 hernia repairs)   • ORTHOPEDIC SURGERY         Therapy Treatment    Rehabilitation Treatment Summary     Row Name 10/18/19 1600 10/18/19 1500           Treatment Time/Intention    Discipline  occupational therapist  -  speech language pathologist  -     Document Type  therapy note (daily note)  -  therapy note (daily note)  -     Subjective Information  complains of;weakness  -  no complaints  -     Mode of Treatment  individual therapy  -  speech-language pathology  -     Patient/Family Observations  Pt abed with spouse & dtr. He is agreeable to get up though fatigued. He requires a 2-person mod (A) to come fully EOB. He used the sit-to-stand lift again this date, with a little less spasticity noted in his Lt leg. He dependently transfered to the chair and then OT stretched his LUE, achieveing all but 10* of elbow extension and full finger & wrist flex/ex except minimal flexion contracture in middle finger PIP. Pt tolerated this without complaint though it was painful to him. He then washed the hand with his RUE and OT applied his resting hand splint which family had brought. Pt was sitting, with the alarm on, and spouse washing his hair with provided items.  -  Pt laying in bed watching TV with grandson at bedside. Pt confused throughout the session but agreeable to therapy. Required some redirection throughout the session to continue participating.  -     Therapy Frequency (OT Eval)  5 times/wk  -  --     Therapy Frequency (Swallow)  --  daily  -     Patient Effort  good  -  good  -     Comment  --  Pt has PEG for primary source of nutrition. Targeted PO trials of HTL and puree with SLP today. Pt occasionally stated he did not want any more PO trials but could be redirected for a few more trials with some cues and education.   -MF     Recorded by [] Kaye Robertson OT 10/18/19 1657 [] Fatuma Fitzpatrick SLP 10/18/19 1607     Row Name 10/18/19 1500             Oral Motor and Function    Dentition Assessment  upper dentures/partial in place;lower dentures/partial in place  -      Secretion Management  WNL/WFL  -      Mucosal  Quality  moist, healthy  -      Volitional Cough  weak  -MF      Recorded by [] Fatuma Fitzpatrick [facility] 10/18/19 1607      Row Name 10/18/19 1500             General Eating/Swallowing Observations    Eating/Swallowing Skills  fed by SLP  -      Positioning During Eating  upright in bed  -      Consistencies Trialed  pureed;thin liquids refused HTL cranberry juice  -      Recorded by [] Fatuma Fitzpatrick [facility] 10/18/19 1607      Row Name 10/18/19 1500             Clinical Swallow Eval    Oral Prep Phase  WFL  -MF      Pharyngeal Phase  suspected pharyngeal impairment  -      Clinical Swallow Evaluation Summary  Pt accepted therapeutic trials of thins via spoon and puree from SLP. Attempted to provide HTL via spoon but pt refused as he does not like cranberry juice. Pt with delayed swallow and some difficulty coordinating a swallow initiation. Pt with overt cough following 1/6 trials thins via spoon. No overt s/s of aspiration with any trial of puree. Discussed rationale for PO trials with pt and grandson. Both verbalized understanding but question pt's level of comprehension.   -      Recorded by [] Fatuma Fitzpatrick [facility] 10/18/19 1607      Row Name 10/18/19 1500             Pharyngeal Phase Concerns    Pharyngeal Phase Concerns  cough  -      Cough  thin  -      Recorded by [] Fatuma Fitzpatrick [facility] 10/18/19 1607      Row Name 10/18/19 1500             Recommendations    Predicted Duration Therapy Intervention (Days)  until discharge  -      SLP Diet Recommendation  NPO;other (see comments) tsp sips water with nsg after oral care  -      Recommended Diagnostics  reassess via [place] in 1-3 weeks following rehab  -      SLP Rec. for Method of Medication Administration  meds via alternate route  -      Monitor for Signs of Aspiration  yes;notify SLP if any concerns  -      Recorded by [] Fatuma Fitzpatrick [facility] 10/18/19 1607      Row Name 10/18/19 1600             Pain Scale: FACES  Pre/Post-Treatment    Pain: FACES Scale, Pretreatment  0-->no hurt  -MH      Pain: FACES Scale, Post-Treatment  4-->hurts little more  -MH      Recorded by [MH] Kaye Robertson OT 10/18/19 1657      Row Name                Wound 10/10/19 1809 Left anterior;lower leg Abrasion    Wound - Properties Group Date first assessed: 10/10/19 [CR] Time first assessed: 1809 [CR] Side: Left [CR] Orientation: anterior;lower [CR] Location: leg [CR] Primary Wound Type: Abrasion [CR] Recorded by:  [CR] Marzena Tolentino RN 10/10/19 1809    Row Name                Wound 10/10/19 1809 Right gluteal Skin Tear    Wound - Properties Group Date first assessed: 10/10/19 [CR] Time first assessed: 1809 [CR] Side: Right [CR] Location: gluteal [CR] Primary Wound Type: Skin tear [CR] Recorded by:  [CR] Marzena Tolentino RN 10/10/19 1809    Row Name                Wound 10/10/19 1810 Right gluteal Abrasion    Wound - Properties Group Date first assessed: 10/10/19 [CR] Time first assessed: 1810 [CR] Side: Right [CR] Location: gluteal [CR] Primary Wound Type: Abrasion [CR] Recorded by:  [CR] Marzena Tolentino RN 10/10/19 1810    Row Name 10/18/19 1500             Outcome Summary/Treatment Plan (SLP)    Daily Summary of Progress (SLP)  progress toward functional goals as expected  -      Barriers to Overall Progress (SLP)  Pt participated in PO trials of puree and thins via tsp today. Cough in 1/6 trials thins via spoon. No overt s/s of aspiration with puree. Reviewed recs for NPO, modified FWP (tsp sips water with nsg and trained caregivers following oral care), s/s of aspiration, and plan to continue therapeutic trials of HTL and puree w/ pt while he is IP at [facility]. Also discussed recs for continuing ST services at next level of care. Pt will need repeat VFSS in 1-3 weeks after rehab.  -      Plan for Continued Treatment (SLP)  Continue NPO w/ PEG. F/u w/ pt for PO trials of puree/HTL with SLP, may have tsp sips water after oral care with  nsg and caregivers. Rec ST services at next level of care for dysphagia tx. Repeat VFSS in 1-3 weeks after intensive rehab.  -      Anticipated Dischage Disposition  skilled nursing facility  -MF      Recorded by [MF] Fatuma Fitzpatrick SLP 10/18/19 6968        User Key  (r) = Recorded By, (t) = Taken By, (c) = Cosigned By    Initials Name Effective Dates Discipline    Kaye Dennis OT 03/01/19 -  OT    Marzena Mcconnell RN 03/01/19 -  Nurse    Fatuma Mejia SLP 06/17/19 -  SLP        Wound 10/10/19 1809 Left anterior;lower leg Abrasion (Active)   Dressing Appearance dry;intact;no drainage 10/18/2019  7:50 AM   Closure MEREDITH 10/18/2019  7:50 AM   Base dressing in place, unable to visualize 10/18/2019  7:50 AM   Drainage Amount none 10/18/2019  7:50 AM   Dressing Care, Wound foam 10/18/2019  7:50 AM       Wound 10/10/19 1809 Right gluteal Skin Tear (Active)   Dressing Appearance dry;intact;no drainage 10/18/2019  7:50 AM   Closure [facility]EDITH 10/18/2019  7:50 AM   Base dressing in place, unable to visualize 10/18/2019  7:50 AM   Drainage Amount none 10/18/2019  7:50 AM   Dressing Care, Wound foam 10/18/2019  7:50 AM       Wound 10/10/19 1810 Right gluteal Abrasion (Active)   Dressing Appearance dry;intact;no drainage 10/18/2019  7:50 AM   Closure MEREDITH 10/18/2019  7:50 AM   Base dressing in place, unable to visualize 10/18/2019  7:50 AM   Drainage Amount none 10/18/2019  7:50 AM   Dressing Care, Wound foam 10/18/2019  7:50 AM     Rehab Goal Summary     Row Name 10/18/19 1600             Swallow Goals (SLP)    Oral Nutrition/Hydration Goal Selection (SLP)  oral nutrition/hydration, SLP goal 1;oral nutrition/hydration, SLP goal 2;oral nutrition/hydration, SLP goal (free text)  -         Oral Nutrition/Hydration Goal 1 (SLP)    Oral Nutrition/Hydration Goal 1, SLP  Pt will participate in a VFSS to further assess pharyngeal stage of swallow.  -      Time Frame (Oral Nutrition/Hydration Goal 1, SLP)  3 days  -       Barriers (Oral Nutrition/Hydration Goal 1, SLP)  Complete 10/16. Pt's swallow deemed not functional for PO at this time.  -MF      Progress/Outcomes (Oral Nutrition/Hydration Goal 1, SLP)  goal met  -MF         Oral Nutrition/Hydration Goal (SLP)    Oral Nutrition/Hydration Goal, SLP  Pt will tolerate trials of puree and NTL at bedside.   -MF      Time Frame (Oral Nutrition/Hydration Goal, SLP)  by discharge  -      Barriers (Oral Nutrition/Hydration Goal, SLP)  Accepted trials of thins via spoon and puree via spoon. Refused HTL as he does not like cranberry juice. Cough in 1/6 trials thins via spoon. No overt s/s of asp with puree.  -      Progress/Outcomes (Oral Nutrition/Hydration Goal, SLP)  continuing progress toward goal  -        User Key  (r) = Recorded By, (t) = Taken By, (c) = Cosigned By    Initials Name Provider Type Discipline    Fatuma Mejia SLP Speech and Language Pathologist SLP        Occupational Therapy Education     Title: PT OT SLP Therapies (Done)     Topic: Occupational Therapy (Done)     Point: ADL training (Done)     Description: Instruct learner(s) on proper safety adaptation and remediation techniques during self care or transfers.   Instruct in proper use of assistive devices.    Learning Progress Summary           Patient Acceptance, E, VU by BR at 10/18/2019  4:42 PM    Acceptance, E, VU by  at 10/16/2019  3:33 PM   Family Acceptance, E, VU by  at 10/16/2019  3:33 PM                   Point: Home exercise program (Done)     Description: Instruct learner(s) on appropriate technique for monitoring, assisting and/or progressing therapeutic exercises/activities.    Learning Progress Summary           Patient Acceptance, E, VU by BR at 10/18/2019  4:42 PM    Acceptance, E, VU by  at 10/16/2019  3:33 PM   Family Acceptance, E, VU by  at 10/16/2019  3:33 PM                   Point: Precautions (Done)     Description: Instruct learner(s) on prescribed precautions during  self-care and functional transfers.    Learning Progress Summary           Patient Acceptance, E, VU by  at 10/18/2019  4:42 PM    Acceptance, E, VU by  at 10/16/2019  3:33 PM   Family Acceptance, E, VU by  at 10/16/2019  3:33 PM                   Point: Body mechanics (Done)     Description: Instruct learner(s) on proper positioning and spine alignment during self-care, functional mobility activities and/or exercises.    Learning Progress Summary           Patient Acceptance, E, VU by  at 10/18/2019  4:42 PM    Acceptance, E, VU by  at 10/16/2019  3:33 PM    MIGUEL Potts, IVETTE by  at 10/15/2019  1:34 PM   Family Acceptance, E, VU by  at 10/16/2019  3:33 PM    MIGUEL Potts DU by  at 10/15/2019  1:34 PM                               User Key     Initials Effective Dates Name Provider Type Discipline     03/01/19 -  Kyae Robertson OT Occupational Therapist OT     03/04/19 -  Ivana Wellington RN Registered Nurse Nurse     03/01/19 -  Sherri Salas RN Registered Nurse Nurse                OT Recommendation and Plan  Outcome Summary/Treatment Plan (OT)  Anticipated Discharge Disposition (OT): inpatient rehabilitation facility  Planned Therapy Interventions (OT Eval): activity tolerance training, BADL retraining, functional balance retraining, manual therapy/joint mobilization, neuromuscular control/coordination retraining, occupation/activity based interventions, orthotic fabrication/fitting/training, transfer/mobility retraining  Therapy Frequency (OT Eval): 5 times/wk  Plan of Care Review  Plan of Care Reviewed With: patient, spouse  Plan of Care Reviewed With: patient, spouse  Outcome Summary: Pt is very motivated to improve s/p CVA nearly 2 months ago. he still has moderate LUE/LLE spasticity & now has a PEG with dysphagia, all of which severly limit his function & mobility. He was previously (I) in all I/ADL. He will need to return to rehab at d/c.       Time Calculation:   Time Calculation- OT      Row Name 10/18/19 1657             Time Calculation- OT    OT Start Time  1600  -MH      OT Stop Time  1635  -MH      OT Time Calculation (min)  35 min  -      Total Timed Code Minutes- OT  35 minute(s)  -      OT Received On  10/18/19  -      OT - Next Appointment  10/21/19  -        User Key  (r) = Recorded By, (t) = Taken By, (c) = Cosigned By    Initials Name Provider Type     Kaye Robertson OT Occupational Therapist        Therapy Charges for Today     Code Description Service Date Service Provider Modifiers Qty    69964976732  OT THERAPEUTIC ACT EA 15 MIN 10/18/2019 Kaye Robertson OT GO 1    34183997791 HC OT SELF CARE/MGMT/TRAIN EA 15 MIN 10/18/2019 Kaye Robertson OT GO 1    [id number]  OT NEUROMUSC RE EDUCATION EA 15 MIN 10/18/2019 Kaye Robertson OT GO 1               Kaye Robertson OT  10/18/2019

## 2019-10-18 NOTE — PLAN OF CARE
Problem: Patient Care Overview  Goal: Plan of Care Review  Outcome: Ongoing (interventions implemented as appropriate)   10/18/19 0505   Coping/Psychosocial   Plan of Care Reviewed With patient   Plan of Care Review   Progress improving   OTHER   Outcome Summary Patient has rested well through the night. Continues to tolerate tube feeds. No complaints at this time.       Problem: Skin Injury Risk (Adult)  Goal: Skin Health and Integrity  Outcome: Ongoing (interventions implemented as appropriate)      Problem: Dysphagia (Adult)  Goal: Functional/Safe Swallow  Outcome: Ongoing (interventions implemented as appropriate)    Goal: Compensatory Techniques to Improve Safety/Function with Swallowing  Outcome: Ongoing (interventions implemented as appropriate)

## 2019-10-18 NOTE — PROGRESS NOTES
Continued Stay Note  [facility]     Patient Name: Anuj Escobedo  MRN: [id number]  Today's Date: 10/18/2019    Admit Date: 10/10/2019    Discharge Plan     Row Name 10/18/19 1326       Plan    Plan  [facility] accepted, aleah started 10/17, ROSETTE approved.     Plan Comments  Met with patient and son at bedside, delivered IM letter. Tube feeds continue to be administered and adjusted.             Expected Discharge Date and Time     Expected Discharge Date Expected Discharge Time    Oct 19, 2019           Tonya Maddox  [phone]

## 2019-10-18 NOTE — PROGRESS NOTES
"Adult Nutrition  Assessment/PES    Patient Name:  Anuj Escobedo  YOB: 1925  MRN: [id number]  Admit Date:  10/10/2019    Assessment Date:  10/18/2019    Comments: Pt tolerating TF at goal. Continue TF as ordered.     Reason for Assessment                Reason for Assessment    Reason For Assessment Follow up per protocol.    TF/PN       Diagnosis PMH: CVA, hyperlipidemia, CAD status post CABG, thrush, vitamin D deficiency, chronic atrial fibrillation, hypertension, and MI      Current Dx: Admit c dysphagia. Possible AMANDA. Oral Thrush. PEG placed 10/14. Multiple attempts to place DHT 10/11 but failed. MD offered TPN until PEG placed.     TPN stopped 10/14. Received 4 days.         Nutrition/Diet History                Nutrition/Diet History    Typical Food/Fluid Intake 10/11: D/W family intake and stated had not been able to eat anything in the last 9-10 days but had not really been eaten well prior. Stroke 4 weeks ago. Confirmed weight loss.      Food Allergies No known food allergies.          Anthropometrics             Anthropometrics    Height  177.8 cm (70\")    Weight 72.7 kg (160 lb) 10/18/19       Admit Weight    Admit Weight  70.8 kg (156 lb)   10/10/19       Ideal Body Weight (IBW)    Ideal Body Weight (IBW) (kg)  76 kg    % Ideal Body Weight  96%        Usual Body Weight (UBW)    Usual Body Weight  81.6 kg (180 lb) - per family    % Usual Body Weight  89%    Weight Loss Weight loss of 10.4% in 6 months - severe    Weight History  Wt hx:   173.5 lb (6/19),  172 lb (4/19),  180 lb (3/19),  179.8 (1/19),   182.4 (12/18),   181 lb (10/18),   180 lb (9/18)       Body Mass Index (BMI)    BMI (kg/m2)  23.0        Labs/Tests/Procedures/Meds                Labs/Procedures/Meds    Lab Results Comments Abnormals: Gluc 129 H, BUN 27 H, Alk Phos 122 H, Alb 2.3 L,         Medications    Pertinent Medications Comments Vit D3, Magic mouth wash, Klor con, Zoloft,          Physical Findings                " "Physical Findings    Overall Physical Appearance 10/18: At visit pt sleeping and continues to look malnourished. Spoke with CHAI Polanco who reports pt tolerating TF without intolerance.     10/16: Patient continues to appear frail. NFPE has been completed.    10/14: Patient continues to appear frail. NFPE completed at initial assessment.     10/11: Appears Frail, NFPE completed 10/11/19; See MSA     Gastrointestinal + BM 10/17      Tubes PEG placed 10/14     Oral/Mouth Cavity Dysphagia     Skin Left leg abrasion, right gluteal ST, Right gluteal Abrasion          Estimated/Assessed Needs              Calculation Measurements    Weight Used For Calculations 69.3 kg (152 lb)  --    Height 177.8 cm (70\") -       Estimated/Assessed Needs    Additional Documentation -  --       Calorie Requirements    Weight Used For Calorie Calculations  69.3 kg (152 lb)  --    Estimated Calorie Need Method 35 kcals/kg   --    Estimated Calorie Requirement Comment 2425 kcals  --       Protein Requirements    Weight Used For Protein Calculations 69.3 kg (152 lb)  --    Est Protein Requirement Amount (gms/kg) 1.5 gm protein  --    Estimated Protein Requirements (gms/day) 103 g  --       Fluid Requirements    Estimated Fluid Requirements (mL/day) 2425 mL (using 1mL/kcal, adjust based on hydration)  --              Nutrition Prescription Ordered                Nutrition Prescription PO    Current PO Diet  NPO        Nutrition Prescription EN    Enteral Route PEG     Product Nutren 1.5     Modulars -     TF Delivery Method Continuous     Continuous TF Goal Rate (mL/hr) 75mL/hr - Visually observed tube feed running at goal with correct water flush     Water flush (mL)  20mL     Water Flush Frequency Q1hr         Evaluation of Received Nutrient/Fluid Intake                PO Evaluation    % PO Intake 0% r/t NPO             EN Evaluation    TF Changes No changes since last review.      TF Residual WNL     TF Tolerance No s/s of intolerance     "       Malnutrition Severity Assessment     Row Name 10/11/19 0849          Malnutrition Severity Assessment    Malnutrition Type  Chronic Disease - Related Malnutrition        Insufficient Energy Intake     Insufficient Energy Intake   <75% of est. energy requirement for > or equal to 1 month Per family pt has not been eating well for awhile and has had trouble eating since the stroke 9/14/19        Unintentional Weight Loss     Unintentional Weight Loss   Weight loss greater than 10% in six months Weight loss of 104% x 6 months- weight 4/19- 172 lbs and current body weight 154.5 (10/10/19)        Muscle Loss    Loss of Muscle Mass Findings  Severe     [place] Region  Severe - deep hollowing/scooping, lack of muscle to touch, facial bones well defined     Clavicle Bone Region  Severe - protruding prominent bone     Acromion Bone Region  Severe - squared shoulders, bones, and acromion process protrusion prominent     Scapular Bone Region  Severe - prominent bones, depressions easily visible between ribs, scapula, spine, shoulders     Dorsal Hand Region  None     Patellar Region  Moderate - patella more prominent, less muscle definition around patella     Anterior Thigh Region  Moderate - mild depression on inner thigh     Posterior Calf Region  Moderate - some roundness, slight firmness        Fat Loss    Subcutaneous Fat Loss Findings  Severe     Orbital Region   Severe - pronounced hollowness/depression, dark circles, loose saggy skin     Upper Arm Region  Severe - mostly skin, very little space between folds, fingers touch     Thoracic & Lumbar Region  -- did not obtain d/t positioning.         Criteria Met (Must meet criteria for severity in at least 2 of these categories: M Wasting, Fat Loss, Fluid, Secondary Signs, Wt. Status, Intake)    Patient meets criteria for   Severe Malnutrition           Problem/Interventions:  Problem 1                Nutrition Diagnoses Problem 1    Problem 1 Severe chronic  malnutrition      Etiology (related to) physiological causes (stroke, MI)     Signs/Symptoms (evidenced by) severe muscle wasting, severe fat loss, < 75% of est energy requirement for >/= 1 month, and 10.4% weight loss x 6 months.                  Intervention Goal                Intervention Goal    General Pt will continue to tolerate EN at goal          Nutrition Intervention                Nutrition Intervention    RD/Tech Action Continue tube feed and advance as tolerated         Nutrition Prescription                Nutrition Prescription EN    Enteral Prescription NPO + Tube feed     End goal: Nutren 1.5 at 75mL/hr + 40mL water flush q1hr provides 2475kcals (102%), 112gPRO (108%), 2134mL free water         Education/Evaluation                Monitor/Evaluation    Monitor TF tolerance, labs, BM, weight, transition from TPN to TF           Electronically signed by:  Susan Sahni RD  10/18/19 3:31 PM

## 2019-10-18 NOTE — PROGRESS NOTES
[facility] Medicine Services Daily Progress Note        Hospitalist Team  LOS 8 days      Patient Care Team:  Devon Arriaza Jr., MD as PCP - General    Assessment / Plan    Dysphagia  Multiple failed attempts at Dobbhoff tube insertion  Failed to place NG tube in IR despite multiple attempts  GI placed PEG tube 10/14   on tube feed and  has reached goal  Water flushes decreased as hypernatremia has resolved  TPN has been discontinued    Acute kidney injury  Resolved with hydration     Chronic hypotension  Systolic averages 100-110  monitor BP     History of CAD s/p CABG  Followed outpatient by [facility]  No active angina      Chronic atrial fibrillation   rate controlled  Discontinued Lovenox and start back on Eliquis    Hyperlipidemia   On atorvastatin     Oral thrush/esophageal candidiasis  On fluconazole and  Liliana's Magic mouthwash     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  On Eliquis  Residual left-sided weakness  Continue baclofen for muscle spasm   Continue PT/OT  Repeat CT no acute findings     BPH  On  Flomax     Depression  on Zoloft     Vitamin D deficiency       History of squamous cell carcinoma     History of MI     VTE prophylaxis-on Eliquis    Plan for disposition  Inpatient rehab    Chief Complaint / Subjective  CC: Dysphagia     Patient seen and examined  No new symptoms  Awaiting rehab placement      Brief Synopsis of Hospital Course/HPI  89-year-old  male with a past medical history of CVA, hyperlipidemia, CAD status post CABG, thrush, vitamin D deficiency, chronic atrial fibrillation, hypertension, and MI who presented to [facility] on 10/10/2019 with complaints of dysphagia.  Per the wife the patient had a stroke on 9/14/2019 and was admitted to [facility].  Patient was then discharged to [facility] and has been there for about 3 weeks.  Patient's wife states that the patient was able to eat some at [facility], however at [facility] he has progressively  gotten weaker due to not being able to swallow.  Patient has had left-sided weakness as a result of the stroke.  Per the family last week patient was given a fourth of a tablet of Prozac and went unresponsive for 48 hours at [facility].  Patient has also lost 9 pounds in 10 days due to getting no nutrition.  He was placed on IV fluids at [facility] but was taken off of them due to them causing peripheral edema.  Denies recent nausea, vomiting, diarrhea, fever, chills.     In the ED, vital signs were stable.  GI was consulted.  Dobbhoff tube was ordered.      ROS:  Review of Systems   Constitutional: Negative for chills and fever.   HENT: Negative for congestion.    Respiratory: Negative for chest tightness.    Cardiovascular: Negative for chest pain.   Gastrointestinal: Negative for nausea.   Neurological: Positive for weakness.   All other systems reviewed and are negative.        Family History   Problem Relation Age of Onset   • Atrial fibrillation Sister    • Atrial fibrillation Sister        Past Medical History:   Diagnosis Date   • CAD (coronary artery disease)     s/p CABG 1993 x single vessel   • Chronic atrial fibrillation    • Dyslipidemia    • ED (erectile dysfunction)    • HTN (hypertension)    • Long term current use of anticoagulant therapy    • Myocardial infarction (CMS/HCC)    • Shingles 09/2017   • TIA (transient ischemic attack) 2015       Social History     Socioeconomic History   • Marital status:      Spouse name: Not on file   • Number of children: Not on file   • Years of education: Not on file   • Highest education level: Not on file   Tobacco Use   • Smoking status: Former Smoker     Packs/day: 1.00     Types: Cigarettes   Substance and Sexual Activity   • Alcohol use: No           Objective      Vital Signs  Temp:  [97.7 °F (36.5 °C)-97.9 °F (36.6 °C)] 97.9 °F (36.6 °C)  Heart Rate:  [66-73] 69  Resp:  [16-18] 16  BP: (106-146)/(69-87) 146/87  Oxygen Therapy  SpO2: 97 %  Pulse Oximetry  "Type: Intermittent  Device (Oxygen Therapy): room air  Flowsheet Rows      First Filed Value   Admission Height  177.8 cm (70\") Documented at 10/10/2019 1145   Admission Weight  70.8 kg (156 lb) Documented at 10/10/2019 1145        Intake & Output (last 3 days)       10/15 0701 - 10/16 0700 10/16 0701 - 10/17 0700 10/17 0701 - 10/18 0700 10/18 0701 - 10/19 0700    I.V. (mL/kg)  346 (5)      Other 40 861 582     NG/GT  1144 1162     IV Piggyback        TPN        Total Intake(mL/kg) 40 (0.6) 2351 (33.9) 1744 (24)     Urine (mL/kg/hr) 100 (0.1) 410 (0.2) 150 (0.1)     Stool   0     Total Output 100 410 150     Net -60 +1941 +1594             Urine Unmeasured Occurrence  3 x 4 x     Stool Unmeasured Occurrence   1 x         Lines, Drains & Airways    Active LDAs     Name:   Placement date:   Placement time:   Site:   Days:    PICC Double Lumen 10/11/19 Right Basilic   10/11/19    1945    Basilic   1    Peripheral IV 10/06/19 2030 Right Forearm   10/06/19    2030    Forearm   5                  Physical Exam:  General: well-developed and well-nourished, NAD  HEENT: NC/AT, EOMI, PERRLA  Heart: RRR. No murmur   Chest: CTAB, no w/r/r, normal respiratory effort  Abdominal: Soft. NT/ND. Bowel sounds present--PEG tube intact  Musculoskeletal: Normal ROM.  No edema. No calf tenderness.  Neurological: AAOx3, no focal deficits  Skin: Skin is warm and dry. No rash  Psychiatric: Normal mood and affect.      Procedures:  Procedure(s):  ESOPHAGOGASTRODUODENOSCOPY WITH PERCUTANEOUS ENDOSCOPIC GASTROSTOMY TUBE INSERTION    Procedure(s):  ESOPHAGOGASTRODUODENOSCOPY WITH PERCUTANEOUS ENDOSCOPIC GASTROSTOMY TUBE INSERTION  -------------------       Results Review:     I reviewed the patient's new clinical results.    Results from last 7 days   Lab Units 10/18/19  0525 10/17/19  0456 10/16/19  0635 10/15/19  0604 10/14/19  0410 10/13/19  0353 10/12/19  0325   SODIUM mmol/L 136 138 141 145* 146* 149* 153*   POTASSIUM mmol/L 4.4 4.4 4.5 4.0 " 3.6 3.6 3.5*   CHLORIDE mmol/L 103 105 107 110 112* 112* 116*   CO2 mmol/L 29.0 28.0 28.0 29.0 29.0 30.0 31.0   BUN mg/dL 27* 31* 31* 24* 23* 25* 32*   CREATININE mg/dL 0.80 0.76 0.84 0.90 0.90 1.00 1.10   GLUCOSE mg/dL 129* 127* 123* 117* 118* 126* 113*   ALBUMIN g/dL 2.30* 2.30* 2.70* 2.40* 2.30* 2.50* 2.30*   BILIRUBIN mg/dL 1.0 1.0 1.4* 2.1* 1.9* 2.5* 1.8*   ALK PHOS U/L 122* 99 90 68 67 74 67   AST (SGOT) U/L 26 28 29 29 31 30 28   ALT (SGPT) U/L 13 13 15 19 20 23 18     Cr Clearance Estimated Creatinine Clearance: 58.1 mL/min (by C-G formula based on SCr of 0.8 mg/dL).    Coag   Results from last 7 days   Lab Units 10/14/19  0411   INR  1.25*   APTT seconds 30.1       HbA1C No results found for: HGBA1C  Blood Glucose   Results from last 7 days   Lab Units 10/17/19  1220 10/13/19  1204 10/13/19  0656 10/13/19  0007 10/12/19  0613   GLUCOSE mg/dL 138* 126* 122* 102 108*       UA          Microbiology       Invalid input(s): PROCALCITONIN    ABG        EKG  ECG/EMG Results (most recent)     None          Imaging:  Fl Video Swallow With Speech    Result Date: 10/16/2019  As above. Please see speech pathology report for detailed evaluation and dietary recommendations.  Electronically Signed By-Antonio Pearson On:10/16/2019 3:01 PM This report was finalized on [id number] by  Antonio Pearson, .           Xrays, labs reviewed personally by physician.    Medication Review:   I have reviewed the patient's current medication list    Scheduled Meds    apixaban 5 mg Oral Q12H   atorvastatin 5 mg Oral Nightly   cholecalciferol 1,000 Units Oral Daily   fluconazole 200 mg Intravenous Daily   hydrocortisone-diphenhydramine-nystatin 15 mL Swish & Spit 4x Daily   lansoprazole 30 mg Per G Tube QAM   potassium chloride 20 mEq Per PEG Tube Daily   sertraline 25 mg Oral Nightly   sodium chloride 10 mL Intravenous Q12H   tamsulosin 0.4 mg Oral Nightly       Meds Infusions    sodium chloride 9 mL/hr    sodium chloride 9 mL/hr Last  Rate: 9 mL/hr (10/14/19 0846)       Meds PRN  bisacodyl  •  hydrALAZINE  •  influenza vaccine  •  melatonin  •  Methocarbamol  •  ondansetron **OR** ondansetron  •  [DISCONTINUED] potassium chloride **OR** [DISCONTINUED] potassium chloride **OR** potassium chloride  •  sodium chloride  •  sodium chloride  •  sodium chloride  •  sodium chloride  •  sodium chloride  •  sodium chloride      Hospital problem list:    Dysphagia    Atrial fibrillation, chronic    Dyslipidemia    Degenerative arthritis    Coronary artery disease    Benign prostatic hyperplasia    Hypertension    Long term current use of anticoagulant therapy    Primary malignant neoplasm of head (CMS/HCC)    Oropharyngeal dysphagia    Abrasions of multiple sites        Ramon Freedman MD  10/18/19  8:57 AM

## 2019-10-18 NOTE — THERAPY TREATMENT NOTE
Acute Care - Speech Language Pathology   Swallow Treatment Note  Zhao     Patient Name: Anuj Escobedo  : 1925  MRN: [id number]  Today's Date: 10/18/2019               Admit Date: 10/10/2019    Visit Dx:      ICD-10-CM ICD-9-CM   1. Dysphagia, unspecified type R13.10 787.20   2. Oropharyngeal dysphagia R13.12 787.22     Patient Active Problem List   Diagnosis   • Dysphagia   • Atrial fibrillation, chronic   • Dyslipidemia   • Degenerative arthritis   • Coronary artery disease   • Benign prostatic hyperplasia   • Hypertension   • Long term current use of anticoagulant therapy   • Mixed hyperlipidemia   • Peripheral edema   • Primary malignant neoplasm of head (CMS/HCC)   • Shingles   • Oropharyngeal dysphagia   • Abrasions of multiple sites       Therapy Treatment  Rehabilitation Treatment Summary     Row Name 10/18/19 1500             Treatment Time/Intention    Discipline  speech language pathologist  -      Document Type  therapy note (daily note)  -      Subjective Information  no complaints  -      Mode of Treatment  speech-language pathology  -      Patient/Family Observations  Pt laying in bed watching TV with grandson at bedside. Pt confused throughout the session but agreeable to therapy. Required some redirection throughout the session to continue participating.  -      Therapy Frequency (Swallow)  daily  -      Patient Effort  good  -      Comment  Pt has PEG for primary source of nutrition. Targeted PO trials of HTL and puree with SLP today. Pt occasionally stated he did not want any more PO trials but could be redirected for a few more trials with some cues and education.   -MF      Recorded by [MF] Fatuma Fitzpatrick SLP 10/18/19 1607      Row Name 10/18/19 1500             Oral Motor and Function    Dentition Assessment  upper dentures/partial in place;lower dentures/partial in place  -      Secretion Management  WNL/WFL  -      Mucosal Quality  moist, healthy  -       Volitional Cough  weak  -MF      Recorded by [] Fatuma Fitzpatrick SLP 10/18/19 1607      Row Name 10/18/19 1500             General Eating/Swallowing Observations    Eating/Swallowing Skills  fed by SLP  -      Positioning During Eating  upright in bed  -      Consistencies Trialed  pureed;thin liquids refused HTL cranberry juice  -      Recorded by [] Fatuma Fitzpatrick SLP 10/18/19 1607      Row Name 10/18/19 1500             Clinical Swallow Eval    Oral Prep Phase  WFL  -MF      Pharyngeal Phase  suspected pharyngeal impairment  -      Clinical Swallow Evaluation Summary  Pt accepted therapeutic trials of thins via spoon and puree from SLP. Attempted to provide HTL via spoon but pt refused as he does not like cranberry juice. Pt with delayed swallow and some difficulty coordinating a swallow initiation. Pt with overt cough following 1/6 trials thins via spoon. No overt s/s of aspiration with any trial of puree. Discussed rationale for PO trials with pt and grandson. Both verbalized understanding but question pt's level of comprehension.   -      Recorded by [] Fatuma Fitzpatrick SLP 10/18/19 1607      Row Name 10/18/19 1500             Pharyngeal Phase Concerns    Pharyngeal Phase Concerns  cough  -      Cough  thin  -      Recorded by [] Fatuma Fitzpatrick SLP 10/18/19 1607      Row Name 10/18/19 1500             Recommendations    Predicted Duration Therapy Intervention (Days)  until discharge  -      SLP Diet Recommendation  NPO;other (see comments) tsp sips water with nsg after oral care  -      Recommended Diagnostics  reassess via VFSS ([facility]) in 1-3 weeks following rehab  -      SLP Rec. for Method of Medication Administration  meds via alternate route  -      Monitor for Signs of Aspiration  yes;notify SLP if any concerns  -      Recorded by [] Fatmua Fitzpatrick [facility] 10/18/19 1606      Row Name                Wound 10/10/19 8585 Left anterior;lower leg Abrasion    Wound -  Properties Group Date first assessed: 10/10/19 [CR] Time first assessed: 1809 [CR] Side: Left [CR] Orientation: anterior;lower [CR] Location: leg [CR] Primary Wound Type: Abrasion [CR] Recorded by:  [CR] Marzena Tolentino RN 10/10/19 1809    Row Name                Wound 10/10/19 1809 Right gluteal Skin Tear    Wound - Properties Group Date first assessed: 10/10/19 [CR] Time first assessed: 1809 [CR] Side: Right [CR] Location: gluteal [CR] Primary Wound Type: Skin tear [CR] Recorded by:  [CR] Marzena Tolentino RN 10/10/19 1809    Row Name                Wound 10/10/19 1810 Right gluteal Abrasion    Wound - Properties Group Date first assessed: 10/10/19 [CR] Time first assessed: 1810 [CR] Side: Right [CR] Location: gluteal [CR] Primary Wound Type: Abrasion [CR] Recorded by:  [CR] Marzena Tolentino RN 10/10/19 1810    Row Name 10/18/19 1500             Outcome Summary/Treatment Plan (SLP)    Daily Summary of Progress (SLP)  progress toward functional goals as expected  -      Barriers to Overall Progress (SLP)  Pt participated in PO trials of puree and thins via tsp today. Cough in 1/6 trials thins via spoon. No overt s/s of aspiration with puree. Reviewed recs for NPO, modified FWP (tsp sips water with nsg and trained caregivers following oral care), s/s of aspiration, and plan to continue therapeutic trials of HTL and puree w/ pt while he is IP at [facility]. Also discussed recs for continuing ST services at next level of care. Pt will need repeat VFSS in 1-3 weeks after rehab.  -      Plan for Continued Treatment (SLP)  Continue NPO w/ PEG. F/u w/ pt for PO trials of puree/HTL with SLP, may have tsp sips water after oral care with nsg and caregivers. Rec ST services at next level of care for dysphagia tx. Repeat VFSS in 1-3 weeks after intensive rehab.  -MF      Anticipated Dischage Disposition  skilled nursing facility  -MF      Recorded by [MF] Fatuma Fitzpatrick SLP 10/18/19 8351        User Key  (r) = Recorded  By, (t) = Taken By, (c) = Cosigned By    Initials Name Effective Dates Discipline    Marzena Mcconnell RN 03/01/19 -  Nurse    Fatuma Mejia, SLP 06/17/19 -  SLP          Outcome Summary  Outcome Summary/Treatment Plan (SLP)  Daily Summary of Progress (SLP): progress toward functional goals as expected (10/18/19 1500 : Fatuma Fitzpatrick, SLP)  Barriers to Overall Progress (SLP): Pt participated in PO trials of puree and thins via tsp today. Cough in 1/6 trials thins via spoon. No overt s/s of aspiration with puree. Reviewed recs for NPO, modified FWP (tsp sips water with nsg and trained caregivers following oral care), s/s of aspiration, and plan to continue therapeutic trials of HTL and puree w/ pt while he is IP at [facility]. Also discussed recs for continuing ST services at next level of care. Pt will need repeat VFSS in 1-3 weeks after rehab. (10/18/19 1500 : Fatuma Fitzpatrick, SLP)  Plan for Continued Treatment (SLP): Continue NPO w/ PEG. F/u w/ pt for PO trials of puree/HTL with SLP, may have tsp sips water after oral care with nsg and caregivers. Rec ST services at next level of care for dysphagia tx. Repeat VFSS in 1-3 weeks after intensive rehab. (10/18/19 1500 : Fatuma Fitzpatrick, SLP)  Anticipated Dischage Disposition: skilled nursing facility (10/18/19 1500 : Fatuma Fitzpatrick, SLP)      SLP GOALS     Row Name 10/18/19 1600 10/16/19 1400          Oral Nutrition/Hydration Goal 1 (SLP)    Oral Nutrition/Hydration Goal 1, SLP  Pt will participate in a VFSS to further assess pharyngeal stage of swallow.  -  Pt will participate in a VFSS to further assess pharyngeal stage of swallow.  (Pended)   -HC     Time Frame (Oral Nutrition/Hydration Goal 1, SLP)  3 days  -  --     Barriers (Oral Nutrition/Hydration Goal 1, SLP)  Complete 10/16. Pt's swallow deemed not functional for PO at this time.  -  Pt had VFSS completed today to assess functional of swallow. See details.   (Pended)   -HC     Progress/Outcomes  (Oral Nutrition/Hydration Goal 1, SLP)  goal met  -MF  goal met  (Pended)   -HC        Oral Nutrition/Hydration Goal 2 (SLP)    Oral Nutrition/Hydration Goal 2, SLP  --  Establish PO diet  (Pended)   -HC     Barriers (Oral Nutrition/Hydration Goal 2, SLP)  --  Pt had VFSS completed today. Still not appropriate for full PO diet, will complete PO trials at bedside wtih ST.   (Pended)   -HC     Progress/Outcomes (Oral Nutrition/Hydration Goal 2, SLP)  --  continuing progress toward goal  (Pended)   -HC        Oral Nutrition/Hydration Goal (SLP)    Oral Nutrition/Hydration Goal, SLP  Pt will tolerate trials of puree and NTL at bedside.   -MF  Pt will tolerate trials of puree and NTL at bedside.   (Pended)   -HC     Time Frame (Oral Nutrition/Hydration Goal, SLP)  by discharge  -MF  by discharge  (Pended)   -HC     Barriers (Oral Nutrition/Hydration Goal, SLP)  Accepted trials of thins via spoon and puree via spoon. Refused HTL as he does not like cranberry juice. Cough in 1/6 trials thins via spoon. No overt s/s of asp with puree.  -  --     Progress/Outcomes (Oral Nutrition/Hydration Goal, SLP)  continuing progress toward goal  -  --       User Key  (r) = Recorded By, (t) = Taken By, (c) = Cosigned By    Initials Name Provider Type    Fatuma Mejia, SLP Speech and Language Pathologist     Gillian Edward, Speech Therapy Student Speech and Language Pathologist          EDUCATION  The patient has been educated in the following areas:   Dysphagia (Swallowing Impairment) Oral Care/Hydration NPO rationale.    SLP Recommendation and Plan  Daily Summary of Progress (SLP): progress toward functional goals as expected  Barriers to Overall Progress (SLP): Pt participated in PO trials of puree and thins via tsp today. Cough in 1/6 trials thins via spoon. No overt s/s of aspiration with puree. Reviewed recs for NPO, modified FWP (tsp sips water with nsg and trained caregivers following oral care), s/s of aspiration,  and plan to continue therapeutic trials of HTL and puree w/ pt while he is IP at [facility]. Also discussed recs for continuing ST services at next level of care. Pt will need repeat VFSS in 1-3 weeks after rehab.  Plan for Continued Treatment (SLP): Continue NPO w/ PEG. F/u w/ pt for PO trials of puree/HTL with SLP, may have tsp sips water after oral care with nsg and caregivers. Rec ST services at next level of care for dysphagia tx. Repeat VFSS in 1-3 weeks after intensive rehab.  Anticipated Dischage Disposition: skilled nursing facility                    Time Calculation:       Therapy Charges for Today     Code Description Service Date Service Provider Modifiers Qty    [id number] HC ST TREATMENT SWALLOW 4 10/18/2019 Fatuma Fitzpatrick SLP GN 1                 LA Araya  10/18/2019

## 2019-10-19 LAB
ALBUMIN SERPL-MCNC: 2.6 G/DL (ref 3.5–5.2)
ALBUMIN/GLOB SERPL: 0.7 G/DL
ALP SERPL-CCNC: 130 U/L (ref 39–117)
ALT SERPL W P-5'-P-CCNC: 13 U/L (ref 1–41)
ANION GAP SERPL CALCULATED.3IONS-SCNC: 5 MMOL/L (ref 5–15)
AST SERPL-CCNC: 29 U/L (ref 1–40)
BILIRUB SERPL-MCNC: 0.9 MG/DL (ref 0.2–1.2)
BUN BLD-MCNC: 30 MG/DL (ref 8–23)
BUN/CREAT SERPL: 38.5 (ref 7–25)
CALCIUM SPEC-SCNC: 8.9 MG/DL (ref 8.2–9.6)
CHLORIDE SERPL-SCNC: 100 MMOL/L (ref 98–107)
CO2 SERPL-SCNC: 30 MMOL/L (ref 22–29)
CREAT BLD-MCNC: 0.78 MG/DL (ref 0.76–1.27)
GFR SERPL CREATININE-BSD FRML MDRD: 93 ML/MIN/1.73
GLOBULIN UR ELPH-MCNC: 3.6 GM/DL
GLUCOSE BLD-MCNC: 128 MG/DL (ref 65–99)
GLUCOSE BLDC GLUCOMTR-MCNC: 116 MG/DL (ref 70–105)
GLUCOSE BLDC GLUCOMTR-MCNC: 123 MG/DL (ref 70–105)
GLUCOSE BLDC GLUCOMTR-MCNC: 129 MG/DL (ref 70–105)
GLUCOSE BLDC GLUCOMTR-MCNC: 147 MG/DL (ref 70–105)
POTASSIUM BLD-SCNC: 4.7 MMOL/L (ref 3.5–5.2)
PROT SERPL-MCNC: 6.2 G/DL (ref 6–8.5)
SODIUM BLD-SCNC: 135 MMOL/L (ref 136–145)

## 2019-10-19 PROCEDURE — 99232 SBSQ HOSP IP/OBS MODERATE 35: CPT | Performed by: INTERNAL MEDICINE

## 2019-10-19 PROCEDURE — 80053 COMPREHEN METABOLIC PANEL: CPT | Performed by: INTERNAL MEDICINE

## 2019-10-19 PROCEDURE — 82962 GLUCOSE BLOOD TEST: CPT

## 2019-10-19 PROCEDURE — 25010000002 FLUCONAZOLE PER 200 MG: Performed by: INTERNAL MEDICINE

## 2019-10-19 RX ADMIN — MELATONIN 1000 UNITS: at 12:03

## 2019-10-19 RX ADMIN — NYSTATIN 15 ML: 100000 SUSPENSION ORAL at 22:35

## 2019-10-19 RX ADMIN — SALINE NASAL SPRAY 2 SPRAY: 1.5 SOLUTION NASAL at 12:03

## 2019-10-19 RX ADMIN — FLUCONAZOLE, SODIUM CHLORIDE 200 MG: 2 INJECTION INTRAVENOUS at 12:03

## 2019-10-19 RX ADMIN — Medication 10 ML: at 22:34

## 2019-10-19 RX ADMIN — SERTRALINE HYDROCHLORIDE 25 MG: 50 TABLET ORAL at 22:34

## 2019-10-19 RX ADMIN — APIXABAN 5 MG: 5 TABLET, FILM COATED ORAL at 22:37

## 2019-10-19 RX ADMIN — Medication 10 ML: at 12:09

## 2019-10-19 RX ADMIN — NYSTATIN 15 ML: 100000 SUSPENSION ORAL at 12:09

## 2019-10-19 RX ADMIN — APIXABAN 5 MG: 5 TABLET, FILM COATED ORAL at 12:03

## 2019-10-19 RX ADMIN — ATORVASTATIN CALCIUM 5 MG: 10 TABLET, FILM COATED ORAL at 22:34

## 2019-10-19 RX ADMIN — NYSTATIN 15 ML: 100000 SUSPENSION ORAL at 18:44

## 2019-10-19 RX ADMIN — POTASSIUM CHLORIDE 20 MEQ: 1.5 POWDER, FOR SOLUTION ORAL at 12:03

## 2019-10-19 RX ADMIN — LANSOPRAZOLE 30 MG: KIT at 05:58

## 2019-10-19 NOTE — PROGRESS NOTES
[facility] Medicine Services Daily Progress Note        Hospitalist Team  LOS 9 days      Patient Care Team:  Devon Arriaza Jr., MD as PCP - General    Assessment / Plan    Dysphagia  Multiple failed attempts at Dobbhoff tube insertion  Failed to place NG tube in IR despite multiple attempts  GI placed PEG tube 10/14   on tube feed and  has reached goal  Water flushes decreased as hypernatremia has resolved  TPN has been discontinued    Acute kidney injury  Resolved with hydration     Chronic hypotension  Systolic averages 100-110  monitor BP     History of CAD s/p CABG  Followed outpatient by [facility]  No active angina      Chronic atrial fibrillation   rate controlled  Discontinued Lovenox and start back on Eliquis    Hyperlipidemia   On atorvastatin     Oral thrush/esophageal candidiasis  On fluconazole and  Liliana's Magic mouthwash     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  On Eliquis  Residual left-sided weakness  Continue baclofen for muscle spasm   Continue PT/OT  Repeat CT no acute findings     BPH  On  Flomax     Depression  on Zoloft     Vitamin D deficiency       History of squamous cell carcinoma     History of MI     VTE prophylaxis-on Eliquis    Plan for disposition  Inpatient rehab    Chief Complaint / Subjective  CC: Dysphagia     Patient seen and examined  No new symptoms  Awaiting rehab placement      Brief Synopsis of Hospital Course/HPI  89-year-old  male with a past medical history of CVA, hyperlipidemia, CAD status post CABG, thrush, vitamin D deficiency, chronic atrial fibrillation, hypertension, and MI who presented to [facility] on 10/10/2019 with complaints of dysphagia.  Per the wife the patient had a stroke on 9/14/2019 and was admitted to [facility].  Patient was then discharged to [facility] and has been there for about 3 weeks.  Patient's wife states that the patient was able to eat some at [facility], however at [facility] he has progressively  gotten weaker due to not being able to swallow.  Patient has had left-sided weakness as a result of the stroke.  Per the family last week patient was given a fourth of a tablet of Prozac and went unresponsive for 48 hours at [facility].  Patient has also lost 9 pounds in 10 days due to getting no nutrition.  He was placed on IV fluids at [facility] but was taken off of them due to them causing peripheral edema.  Denies recent nausea, vomiting, diarrhea, fever, chills.     In the ED, vital signs were stable.  GI was consulted.  Dobbhoff tube was ordered.      ROS:  Review of Systems   Constitutional: Negative for chills and fever.   HENT: Negative for congestion.    Respiratory: Negative for chest tightness.    Cardiovascular: Negative for chest pain.   Gastrointestinal: Negative for nausea.   Neurological: Positive for weakness.   All other systems reviewed and are negative.        Family History   Problem Relation Age of Onset   • Atrial fibrillation Sister    • Atrial fibrillation Sister        Past Medical History:   Diagnosis Date   • CAD (coronary artery disease)     s/p CABG 1993 x single vessel   • Chronic atrial fibrillation    • Dyslipidemia    • ED (erectile dysfunction)    • HTN (hypertension)    • Long term current use of anticoagulant therapy    • Myocardial infarction (CMS/HCC)    • Shingles 09/2017   • TIA (transient ischemic attack) 2015       Social History     Socioeconomic History   • Marital status:      Spouse name: Not on file   • Number of children: Not on file   • Years of education: Not on file   • Highest education level: Not on file   Tobacco Use   • Smoking status: Former Smoker     Packs/day: 1.00     Types: Cigarettes   Substance and Sexual Activity   • Alcohol use: No           Objective      Vital Signs  Temp:  [97 °F (36.1 °C)-97.6 °F (36.4 °C)] 97.3 °F (36.3 °C)  Heart Rate:  [66-80] 80  Resp:  [14-16] 16  BP: (115-128)/(71-85) 128/85  Oxygen Therapy  SpO2: 96 %  Pulse Oximetry Type:  "Intermittent  Device (Oxygen Therapy): room air  Flowsheet Rows      First Filed Value   Admission Height  177.8 cm (70\") Documented at 10/10/2019 1145   Admission Weight  70.8 kg (156 lb) Documented at 10/10/2019 1145        Intake & Output (last 3 days)       10/16 0701 - 10/17 0700 10/17 0701 - 10/18 0700 10/18 0701 - 10/19 0700 10/19 0701 - 10/20 0700    I.V. (mL/kg) 346 (5)       Other 861 582      NG/GT 1144 1162      Total Intake(mL/kg) 2351 (33.9) 1744 (24)      Urine (mL/kg/hr) 410 (0.2) 150 (0.1) 120 (0.1)     Stool  0      Total Output 410 150 120     Net +1941 +1594 -120             Urine Unmeasured Occurrence 3 x 4 x 7 x     Stool Unmeasured Occurrence  1 x          Lines, Drains & Airways    Active LDAs     Name:   Placement date:   Placement time:   Site:   Days:    PICC Double Lumen 10/11/19 Right Basilic   10/11/19    1945    Basilic   1    Peripheral IV 10/06/19 2030 Right Forearm   10/06/19    2030    Forearm   5                  Physical Exam:  General: well-developed and well-nourished, NAD  HEENT: NC/AT, EOMI, PERRLA  Heart: RRR. No murmur   Chest: CTAB, no w/r/r, normal respiratory effort  Abdominal: Soft. NT/ND. Bowel sounds present--PEG tube intact  Musculoskeletal: Normal ROM.  No edema. No calf tenderness.  Neurological: AAOx3, no focal deficits  Skin: Skin is warm and dry. No rash  Psychiatric: Normal mood and affect.      Procedures:  Procedure(s):  ESOPHAGOGASTRODUODENOSCOPY WITH PERCUTANEOUS ENDOSCOPIC GASTROSTOMY TUBE INSERTION    Procedure(s):  ESOPHAGOGASTRODUODENOSCOPY WITH PERCUTANEOUS ENDOSCOPIC GASTROSTOMY TUBE INSERTION  -------------------       Results Review:     I reviewed the patient's new clinical results.    Results from last 7 days   Lab Units 10/19/19  0426 10/18/19  0525 10/17/19  0456 10/16/19  0635 10/15/19  0604 10/14/19  0410 10/13/19  0353   SODIUM mmol/L 135* 136 138 141 145* 146* 149*   POTASSIUM mmol/L 4.7 4.4 4.4 4.5 4.0 3.6 3.6   CHLORIDE mmol/L 100 103 105 " 107 110 112* 112*   CO2 mmol/L 30.0* 29.0 28.0 28.0 29.0 29.0 30.0   BUN mg/dL 30* 27* 31* 31* 24* 23* 25*   CREATININE mg/dL 0.78 0.80 0.76 0.84 0.90 0.90 1.00   GLUCOSE mg/dL 128* 129* 127* 123* 117* 118* 126*   ALBUMIN g/dL 2.60* 2.30* 2.30* 2.70* 2.40* 2.30* 2.50*   BILIRUBIN mg/dL 0.9 1.0 1.0 1.4* 2.1* 1.9* 2.5*   ALK PHOS U/L 130* 122* 99 90 68 67 74   AST (SGOT) U/L 29 26 28 29 29 31 30   ALT (SGPT) U/L 13 13 13 15 19 20 23     Cr Clearance Estimated Creatinine Clearance: 58.5 mL/min (by C-G formula based on SCr of 0.78 mg/dL).    Coag   Results from last 7 days   Lab Units 10/14/19  0411   INR  1.25*   APTT seconds 30.1       HbA1C No results found for: HGBA1C  Blood Glucose   Results from last 7 days   Lab Units 10/19/19  0604 10/17/19  1220 10/13/19  1204 10/13/19  0656 10/13/19  0007   GLUCOSE mg/dL 123* 138* 126* 122* 102       UA          Microbiology       Invalid input(s): PROCALCITONIN    ABG        EKG  ECG/EMG Results (most recent)     None          Imaging:  No radiology results for the last day         Xrays, labs reviewed personally by physician.    Medication Review:   I have reviewed the patient's current medication list    Scheduled Meds    apixaban 5 mg Oral Q12H   atorvastatin 5 mg Oral Nightly   cholecalciferol 1,000 Units Oral Daily   fluconazole 200 mg Intravenous Daily   hydrocortisone-diphenhydramine-nystatin 15 mL Swish & Spit 4x Daily   lansoprazole 30 mg Per G Tube QAM   potassium chloride 20 mEq Per PEG Tube Daily   sertraline 25 mg Oral Nightly   sodium chloride 10 mL Intravenous Q12H   tamsulosin 0.4 mg Oral Nightly       Meds Infusions    sodium chloride 9 mL/hr    sodium chloride 9 mL/hr Last Rate: 9 mL/hr (10/14/19 0846)       Meds PRN  bisacodyl  •  hydrALAZINE  •  influenza vaccine  •  melatonin  •  Methocarbamol  •  ondansetron **OR** ondansetron  •  [DISCONTINUED] potassium chloride **OR** [DISCONTINUED] potassium chloride **OR** potassium chloride  •  sodium chloride  •   sodium chloride  •  sodium chloride  •  sodium chloride  •  sodium chloride  •  sodium chloride      Hospital problem list:    Dysphagia    Atrial fibrillation, chronic    Dyslipidemia    Degenerative arthritis    Coronary artery disease    Benign prostatic hyperplasia    Hypertension    Long term current use of anticoagulant therapy    Primary malignant neoplasm of head (CMS/HCC)    Oropharyngeal dysphagia    Abrasions of multiple sites        Ramon Freedman MD  10/19/19  9:55 AM

## 2019-10-20 LAB
ALBUMIN SERPL-MCNC: 2.5 G/DL (ref 3.5–5.2)
ALBUMIN/GLOB SERPL: 0.7 G/DL
ALP SERPL-CCNC: 126 U/L (ref 39–117)
ALT SERPL W P-5'-P-CCNC: 14 U/L (ref 1–41)
ANION GAP SERPL CALCULATED.3IONS-SCNC: 6 MMOL/L (ref 5–15)
AST SERPL-CCNC: 32 U/L (ref 1–40)
BILIRUB SERPL-MCNC: 0.9 MG/DL (ref 0.2–1.2)
BUN BLD-MCNC: 32 MG/DL (ref 8–23)
BUN/CREAT SERPL: 40 (ref 7–25)
CALCIUM SPEC-SCNC: 9.2 MG/DL (ref 8.2–9.6)
CHLORIDE SERPL-SCNC: 100 MMOL/L (ref 98–107)
CO2 SERPL-SCNC: 29 MMOL/L (ref 22–29)
CREAT BLD-MCNC: 0.8 MG/DL (ref 0.76–1.27)
GFR SERPL CREATININE-BSD FRML MDRD: 90 ML/MIN/1.73
GLOBULIN UR ELPH-MCNC: 3.7 GM/DL
GLUCOSE BLD-MCNC: 101 MG/DL (ref 65–99)
GLUCOSE BLDC GLUCOMTR-MCNC: 109 MG/DL (ref 70–105)
GLUCOSE BLDC GLUCOMTR-MCNC: 117 MG/DL (ref 70–105)
GLUCOSE BLDC GLUCOMTR-MCNC: 127 MG/DL (ref 70–105)
GLUCOSE BLDC GLUCOMTR-MCNC: 132 MG/DL (ref 70–105)
POTASSIUM BLD-SCNC: 4.8 MMOL/L (ref 3.5–5.2)
PROT SERPL-MCNC: 6.2 G/DL (ref 6–8.5)
SODIUM BLD-SCNC: 135 MMOL/L (ref 136–145)

## 2019-10-20 PROCEDURE — 25010000002 FLUCONAZOLE PER 200 MG: Performed by: INTERNAL MEDICINE

## 2019-10-20 PROCEDURE — 82962 GLUCOSE BLOOD TEST: CPT

## 2019-10-20 PROCEDURE — 99232 SBSQ HOSP IP/OBS MODERATE 35: CPT | Performed by: INTERNAL MEDICINE

## 2019-10-20 PROCEDURE — 80053 COMPREHEN METABOLIC PANEL: CPT | Performed by: INTERNAL MEDICINE

## 2019-10-20 RX ADMIN — LANSOPRAZOLE 30 MG: KIT at 06:03

## 2019-10-20 RX ADMIN — TAMSULOSIN HYDROCHLORIDE 0.4 MG: 0.4 CAPSULE ORAL at 21:31

## 2019-10-20 RX ADMIN — APIXABAN 5 MG: 5 TABLET, FILM COATED ORAL at 09:29

## 2019-10-20 RX ADMIN — ATORVASTATIN CALCIUM 5 MG: 10 TABLET, FILM COATED ORAL at 21:30

## 2019-10-20 RX ADMIN — NYSTATIN 15 ML: 100000 SUSPENSION ORAL at 18:07

## 2019-10-20 RX ADMIN — NYSTATIN 15 ML: 100000 SUSPENSION ORAL at 09:29

## 2019-10-20 RX ADMIN — MELATONIN 1000 UNITS: at 09:29

## 2019-10-20 RX ADMIN — NYSTATIN 15 ML: 100000 SUSPENSION ORAL at 21:31

## 2019-10-20 RX ADMIN — Medication 10 ML: at 09:30

## 2019-10-20 RX ADMIN — Medication 10 ML: at 21:31

## 2019-10-20 RX ADMIN — POTASSIUM CHLORIDE 20 MEQ: 1.5 POWDER, FOR SOLUTION ORAL at 09:29

## 2019-10-20 RX ADMIN — NYSTATIN 15 ML: 100000 SUSPENSION ORAL at 13:17

## 2019-10-20 RX ADMIN — SERTRALINE HYDROCHLORIDE 25 MG: 50 TABLET ORAL at 21:31

## 2019-10-20 RX ADMIN — FLUCONAZOLE, SODIUM CHLORIDE 200 MG: 2 INJECTION INTRAVENOUS at 13:16

## 2019-10-20 RX ADMIN — APIXABAN 5 MG: 5 TABLET, FILM COATED ORAL at 21:30

## 2019-10-20 NOTE — PROGRESS NOTES
[facility] Medicine Services Daily Progress Note        Hospitalist Team  LOS 10 days      Patient Care Team:  Devon Arriaza Jr., MD as PCP - General    Assessment / Plan    Dysphagia  Multiple failed attempts at Dobbhoff tube insertion  Failed to place NG tube in IR despite multiple attempts  GI placed PEG tube 10/14   on tube feed and  has reached goal  Water flushes decreased as hypernatremia has resolved  TPN has been discontinued    Acute kidney injury  Resolved with hydration     Chronic hypotension  Systolic averages 100-110  monitor BP     History of CAD s/p CABG  Followed outpatient by [facility]  No active angina      Chronic atrial fibrillation   rate controlled  Discontinued Lovenox and start back on Eliquis    Hyperlipidemia   On atorvastatin     Oral thrush/esophageal candidiasis  On fluconazole and  Liliana's Magic mouthwash     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  On Eliquis  Residual left-sided weakness  Continue baclofen for muscle spasm   Continue PT/OT  Repeat CT no acute findings     BPH  On  Flomax     Depression  on Zoloft     Vitamin D deficiency       History of squamous cell carcinoma     History of MI     VTE prophylaxis-on Eliquis    Plan for disposition  Inpatient rehab when all arrangement has been made    Chief Complaint / Subjective  CC: Dysphagia     Patient seen and examined  No new symptoms  Awaiting rehab placement      Brief Synopsis of Hospital Course/HPI  89-year-old  male with a past medical history of CVA, hyperlipidemia, CAD status post CABG, thrush, vitamin D deficiency, chronic atrial fibrillation, hypertension, and MI who presented to [facility] on 10/10/2019 with complaints of dysphagia.  Per the wife the patient had a stroke on 9/14/2019 and was admitted to [facility].  Patient was then discharged to [facility] and has been there for about 3 weeks.  Patient's wife states that the patient was able to eat some at [facility],  however at [facility] he has progressively gotten weaker due to not being able to swallow.  Patient has had left-sided weakness as a result of the stroke.  Per the family last week patient was given a fourth of a tablet of Prozac and went unresponsive for 48 hours at [facility].  Patient has also lost 9 pounds in 10 days due to getting no nutrition.  He was placed on IV fluids at [facility] but was taken off of them due to them causing peripheral edema.  Denies recent nausea, vomiting, diarrhea, fever, chills.     In the ED, vital signs were stable.  GI was consulted.  Dobbhoff tube was ordered.      ROS:  Review of Systems   Constitutional: Negative for chills and fever.   HENT: Negative for congestion.    Respiratory: Negative for chest tightness.    Cardiovascular: Negative for chest pain.   Gastrointestinal: Negative for nausea.   Neurological: Positive for weakness.   All other systems reviewed and are negative.        Family History   Problem Relation Age of Onset   • Atrial fibrillation Sister    • Atrial fibrillation Sister        Past Medical History:   Diagnosis Date   • CAD (coronary artery disease)     s/p CABG 1993 x single vessel   • Chronic atrial fibrillation    • Dyslipidemia    • ED (erectile dysfunction)    • HTN (hypertension)    • Long term current use of anticoagulant therapy    • Myocardial infarction (CMS/HCC)    • Shingles 09/2017   • TIA (transient ischemic attack) 2015       Social History     Socioeconomic History   • Marital status:      Spouse name: Not on file   • Number of children: Not on file   • Years of education: Not on file   • Highest education level: Not on file   Tobacco Use   • Smoking status: Former Smoker     Packs/day: 1.00     Types: Cigarettes   Substance and Sexual Activity   • Alcohol use: No           Objective      Vital Signs  Temp:  [97.2 °F (36.2 °C)-98.4 °F (36.9 °C)] 98.4 °F (36.9 °C)  Heart Rate:  [68-75] 68  Resp:  [15-16] 15  BP: (117-126)/(68-77) 123/77  Oxygen  "Therapy  SpO2: 99 %  Pulse Oximetry Type: Intermittent  Device (Oxygen Therapy): room air  Flowsheet Rows      First Filed Value   Admission Height  177.8 cm (70\") Documented at 10/10/2019 1145   Admission Weight  70.8 kg (156 lb) Documented at 10/10/2019 1145        Intake & Output (last 3 days)       10/17 0701 - 10/18 0700 10/18 0701 - 10/19 0700 10/19 0701 - 10/20 0700 10/20 0701 - 10/21 0700    I.V. (mL/kg)        Other 582       NG/GT 1162       Total Intake(mL/kg) 1744 (24)       Urine (mL/kg/hr) 150 (0.1) 120 (0.1) 125 (0.1) 100 (0.2)    Stool 0       Total Output 150 120 125 100    Net +1594 -120 -125 -100            Urine Unmeasured Occurrence 4 x 7 x 2 x     Stool Unmeasured Occurrence 1 x           Lines, Drains & Airways    Active LDAs     Name:   Placement date:   Placement time:   Site:   Days:    PICC Double Lumen 10/11/19 Right Basilic   10/11/19    1945    Basilic   1    Peripheral IV 10/06/19 2030 Right Forearm   10/06/19    2030    Forearm   5                  Physical Exam:  General: well-developed and well-nourished, NAD  HEENT: NC/AT, EOMI, PERRLA  Heart: RRR. No murmur   Chest: CTAB, no w/r/r, normal respiratory effort  Abdominal: Soft. NT/ND. Bowel sounds present--PEG tube intact  Musculoskeletal: Normal ROM.  No edema. No calf tenderness.  Neurological: AAOx3, no focal deficits  Skin: Skin is warm and dry. No rash  Psychiatric: Normal mood and affect.      Procedures:  Procedure(s):  ESOPHAGOGASTRODUODENOSCOPY WITH PERCUTANEOUS ENDOSCOPIC GASTROSTOMY TUBE INSERTION    Procedure(s):  ESOPHAGOGASTRODUODENOSCOPY WITH PERCUTANEOUS ENDOSCOPIC GASTROSTOMY TUBE INSERTION  -------------------       Results Review:     I reviewed the patient's new clinical results.    Results from last 7 days   Lab Units 10/20/19  0359 10/19/19  0426 10/18/19  0525 10/17/19  0456 10/16/19  0635 10/15/19  0604 10/14/19  0410   SODIUM mmol/L 135* 135* 136 138 141 145* 146*   POTASSIUM mmol/L 4.8 4.7 4.4 4.4 4.5 4.0 " 3.6   CHLORIDE mmol/L 100 100 103 105 107 110 112*   CO2 mmol/L 29.0 30.0* 29.0 28.0 28.0 29.0 29.0   BUN mg/dL 32* 30* 27* 31* 31* 24* 23*   CREATININE mg/dL 0.80 0.78 0.80 0.76 0.84 0.90 0.90   GLUCOSE mg/dL 101* 128* 129* 127* 123* 117* 118*   ALBUMIN g/dL 2.50* 2.60* 2.30* 2.30* 2.70* 2.40* 2.30*   BILIRUBIN mg/dL 0.9 0.9 1.0 1.0 1.4* 2.1* 1.9*   ALK PHOS U/L 126* 130* 122* 99 90 68 67   AST (SGOT) U/L 32 29 26 28 29 29 31   ALT (SGPT) U/L 14 13 13 13 15 19 20     Cr Clearance Estimated Creatinine Clearance: 58.3 mL/min (by C-G formula based on SCr of 0.8 mg/dL).    Coag   Results from last 7 days   Lab Units 10/14/19  0411   INR  1.25*   APTT seconds 30.1       HbA1C No results found for: HGBA1C  Blood Glucose   Results from last 7 days   Lab Units 10/20/19  1300 10/20/19  0619 10/19/19  2358 10/19/19  2053 10/19/19  1734 10/19/19  1211   GLUCOSE mg/dL 117* 109* 127* 116* 129* 147*       UA          Microbiology       Invalid input(s): PROCALCITONIN    ABG        EKG  ECG/EMG Results (most recent)     None          Imaging:  No radiology results for the last day         Xrays, labs reviewed personally by physician.    Medication Review:   I have reviewed the patient's current medication list    Scheduled Meds    apixaban 5 mg Oral Q12H   atorvastatin 5 mg Oral Nightly   cholecalciferol 1,000 Units Oral Daily   fluconazole 200 mg Intravenous Daily   hydrocortisone-diphenhydramine-nystatin 15 mL Swish & Spit 4x Daily   lansoprazole 30 mg Per G Tube QAM   potassium chloride 20 mEq Per PEG Tube Daily   sertraline 25 mg Oral Nightly   sodium chloride 10 mL Intravenous Q12H   tamsulosin 0.4 mg Oral Nightly       Meds Infusions    sodium chloride 9 mL/hr    sodium chloride 9 mL/hr Last Rate: 9 mL/hr (10/14/19 0846)       Meds PRN  bisacodyl  •  hydrALAZINE  •  influenza vaccine  •  melatonin  •  Methocarbamol  •  ondansetron **OR** ondansetron  •  [DISCONTINUED] potassium chloride **OR** [DISCONTINUED] potassium  chloride **OR** potassium chloride  •  sodium chloride  •  sodium chloride  •  sodium chloride  •  sodium chloride  •  sodium chloride  •  sodium chloride      Hospital problem list:    Dysphagia    Atrial fibrillation, chronic    Dyslipidemia    Degenerative arthritis    Coronary artery disease    Benign prostatic hyperplasia    Hypertension    Long term current use of anticoagulant therapy    Primary malignant neoplasm of head (CMS/HCC)    Oropharyngeal dysphagia    Abrasions of multiple sites        Ramon Freedman MD  10/20/19  4:04 PM

## 2019-10-20 NOTE — PLAN OF CARE
Problem: Patient Care Overview  Goal: Plan of Care Review  Outcome: Ongoing (interventions implemented as appropriate)   10/20/19 2483   Coping/Psychosocial   Plan of Care Reviewed With patient   Plan of Care Review   Progress improving   OTHER   Outcome Summary Pt tolerating tube eeding well. Pt very limited on ROM, assist x1-2 with bed positioning, no c/o pain of discomfort since I took over the pt care at approximate 2330 on 10/19/2019. Will contnue to monitor       Problem: Skin Injury Risk (Adult)  Goal: Skin Health and Integrity  Outcome: Ongoing (interventions implemented as appropriate)      Problem: Dysphagia (Adult)  Goal: Functional/Safe Swallow  Outcome: Ongoing (interventions implemented as appropriate)    Goal: Compensatory Techniques to Improve Safety/Function with Swallowing  Outcome: Ongoing (interventions implemented as appropriate)      Problem: Fall Risk (Adult)  Goal: Absence of Fall  Outcome: Ongoing (interventions implemented as appropriate)      Problem: Nutrition, Parenteral (Adult)  Goal: Signs and Symptoms of Listed Potential Problems Will be Absent, Minimized or Managed (Nutrition, Parenteral)  Outcome: Ongoing (interventions implemented as appropriate)

## 2019-10-21 VITALS
OXYGEN SATURATION: 96 % | TEMPERATURE: 97.7 F | HEIGHT: 70 IN | BODY MASS INDEX: 23.04 KG/M2 | DIASTOLIC BLOOD PRESSURE: 63 MMHG | RESPIRATION RATE: 17 BRPM | HEART RATE: 64 BPM | SYSTOLIC BLOOD PRESSURE: 115 MMHG | WEIGHT: 160.94 LBS

## 2019-10-21 LAB
ALBUMIN SERPL-MCNC: 2.5 G/DL (ref 3.5–5.2)
ALBUMIN/GLOB SERPL: 0.7 G/DL
ALP SERPL-CCNC: 122 U/L (ref 39–117)
ALT SERPL W P-5'-P-CCNC: 12 U/L (ref 1–41)
ANION GAP SERPL CALCULATED.3IONS-SCNC: 6 MMOL/L (ref 5–15)
AST SERPL-CCNC: 28 U/L (ref 1–40)
BASOPHILS # BLD AUTO: 0 10*3/MM3 (ref 0–0.2)
BASOPHILS NFR BLD AUTO: 0.4 % (ref 0–1.5)
BILIRUB SERPL-MCNC: 0.9 MG/DL (ref 0.2–1.2)
BUN BLD-MCNC: 32 MG/DL (ref 8–23)
BUN/CREAT SERPL: 43.2 (ref 7–25)
CALCIUM SPEC-SCNC: 9 MG/DL (ref 8.2–9.6)
CHLORIDE SERPL-SCNC: 99 MMOL/L (ref 98–107)
CO2 SERPL-SCNC: 30 MMOL/L (ref 22–29)
CREAT BLD-MCNC: 0.74 MG/DL (ref 0.76–1.27)
DEPRECATED RDW RBC AUTO: 51.2 FL (ref 37–54)
EOSINOPHIL # BLD AUTO: 0.1 10*3/MM3 (ref 0–0.4)
EOSINOPHIL NFR BLD AUTO: 1.3 % (ref 0.3–6.2)
ERYTHROCYTE [DISTWIDTH] IN BLOOD BY AUTOMATED COUNT: 16.1 % (ref 12.3–15.4)
GFR SERPL CREATININE-BSD FRML MDRD: 98 ML/MIN/1.73
GLOBULIN UR ELPH-MCNC: 3.6 GM/DL
GLUCOSE BLD-MCNC: 133 MG/DL (ref 65–99)
GLUCOSE BLDC GLUCOMTR-MCNC: 114 MG/DL (ref 70–105)
GLUCOSE BLDC GLUCOMTR-MCNC: 115 MG/DL (ref 70–105)
GLUCOSE BLDC GLUCOMTR-MCNC: 126 MG/DL (ref 70–105)
GLUCOSE BLDC GLUCOMTR-MCNC: 145 MG/DL (ref 70–105)
HCT VFR BLD AUTO: 38.9 % (ref 37.5–51)
HGB BLD-MCNC: 13.6 G/DL (ref 13–17.7)
LYMPHOCYTES # BLD AUTO: 0.8 10*3/MM3 (ref 0.7–3.1)
LYMPHOCYTES NFR BLD AUTO: 12.3 % (ref 19.6–45.3)
MCH RBC QN AUTO: 32.3 PG (ref 26.6–33)
MCHC RBC AUTO-ENTMCNC: 35.1 G/DL (ref 31.5–35.7)
MCV RBC AUTO: 92 FL (ref 79–97)
MONOCYTES # BLD AUTO: 0.7 10*3/MM3 (ref 0.1–0.9)
MONOCYTES NFR BLD AUTO: 11.5 % (ref 5–12)
NEUTROPHILS # BLD AUTO: 4.7 10*3/MM3 (ref 1.7–7)
NEUTROPHILS NFR BLD AUTO: 74.5 % (ref 42.7–76)
NRBC BLD AUTO-RTO: 0.1 /100 WBC (ref 0–0.2)
PLATELET # BLD AUTO: 170 10*3/MM3 (ref 140–450)
PMV BLD AUTO: 8.1 FL (ref 6–12)
POTASSIUM BLD-SCNC: 4.5 MMOL/L (ref 3.5–5.2)
PROT SERPL-MCNC: 6.1 G/DL (ref 6–8.5)
RBC # BLD AUTO: 4.22 10*6/MM3 (ref 4.14–5.8)
SODIUM BLD-SCNC: 135 MMOL/L (ref 136–145)
WBC NRBC COR # BLD: 6.3 10*3/MM3 (ref 3.4–10.8)

## 2019-10-21 PROCEDURE — 97535 SELF CARE MNGMENT TRAINING: CPT

## 2019-10-21 PROCEDURE — 82962 GLUCOSE BLOOD TEST: CPT

## 2019-10-21 PROCEDURE — 99239 HOSP IP/OBS DSCHRG MGMT >30: CPT | Performed by: INTERNAL MEDICINE

## 2019-10-21 PROCEDURE — 80053 COMPREHEN METABOLIC PANEL: CPT | Performed by: INTERNAL MEDICINE

## 2019-10-21 PROCEDURE — 85025 COMPLETE CBC W/AUTO DIFF WBC: CPT | Performed by: INTERNAL MEDICINE

## 2019-10-21 PROCEDURE — 97530 THERAPEUTIC ACTIVITIES: CPT

## 2019-10-21 PROCEDURE — 25010000002 FLUCONAZOLE PER 200 MG: Performed by: INTERNAL MEDICINE

## 2019-10-21 RX ORDER — LANSOPRAZOLE
30 KIT EVERY MORNING
Qty: 300 ML | Refills: 0
Start: 2019-10-22

## 2019-10-21 RX ADMIN — LANSOPRAZOLE 30 MG: KIT at 06:04

## 2019-10-21 RX ADMIN — FLUCONAZOLE, SODIUM CHLORIDE 200 MG: 2 INJECTION INTRAVENOUS at 11:14

## 2019-10-21 RX ADMIN — NYSTATIN 15 ML: 100000 SUSPENSION ORAL at 17:52

## 2019-10-21 RX ADMIN — MELATONIN 1000 UNITS: at 10:10

## 2019-10-21 RX ADMIN — POTASSIUM CHLORIDE 20 MEQ: 1.5 POWDER, FOR SOLUTION ORAL at 10:09

## 2019-10-21 RX ADMIN — APIXABAN 5 MG: 5 TABLET, FILM COATED ORAL at 10:10

## 2019-10-21 RX ADMIN — NYSTATIN 15 ML: 100000 SUSPENSION ORAL at 11:14

## 2019-10-21 RX ADMIN — Medication 10 ML: at 10:10

## 2019-10-21 RX ADMIN — NYSTATIN 15 ML: 100000 SUSPENSION ORAL at 10:09

## 2019-10-21 NOTE — PLAN OF CARE
Problem: Patient Care Overview  Goal: Plan of Care Review  Outcome: Ongoing (interventions implemented as appropriate)    Goal: Individualization and Mutuality  Outcome: Ongoing (interventions implemented as appropriate)    Goal: Discharge Needs Assessment  Outcome: Ongoing (interventions implemented as appropriate)    Goal: Interprofessional Rounds/Family Conf  Outcome: Ongoing (interventions implemented as appropriate)      Problem: Skin Injury Risk (Adult)  Goal: Skin Health and Integrity  Outcome: Ongoing (interventions implemented as appropriate)      Problem: Dysphagia (Adult)  Goal: Functional/Safe Swallow  Outcome: Ongoing (interventions implemented as appropriate)    Goal: Compensatory Techniques to Improve Safety/Function with Swallowing  Outcome: Ongoing (interventions implemented as appropriate)      Problem: Fall Risk (Adult)  Goal: Absence of Fall  Outcome: Ongoing (interventions implemented as appropriate)      Problem: Nutrition, Parenteral (Adult)  Goal: Signs and Symptoms of Listed Potential Problems Will be Absent, Minimized or Managed (Nutrition, Parenteral)  Outcome: Ongoing (interventions implemented as appropriate)

## 2019-10-21 NOTE — PROGRESS NOTES
Continued Stay Note   Zhao     Patient Name: Anuj Escobedo  MRN: [id number]  Today's Date: 10/21/2019    Admit Date: 10/10/2019    Discharge Plan    Plan to transfer to [facility] upon dischare.  Await Insurance approval.        Ashli Mack RN

## 2019-10-21 NOTE — PROGRESS NOTES
Discharge Planning Assessment  [facility]     Patient Name: Anuj Escobedo  MRN: [id number]  Today's Date: 10/21/2019    Admit Date: 10/10/2019        Discharge Plan     Row Name 10/21/19 1538       Plan    Plan  [facility] accepted,PASRR approved,precert obtained 10/21,RN and MD informed    Patient/Family in Agreement with Plan  yes        Destination      Service Provider Request Status Selected Services Address Phone Number Fax Number    [facility] Accepted N/A [address] [facility] [place] [phone]       Stephanie P Jordan 10/17/2019 1020    Accepted,precert started 10/17,PASRR approved               [facility] Declined  no available bed N/A [address] [facility] [place] [id number] [phone]       Stephanie P Jordan 10/14/2019 1314    Pt is a return to [facility],pending PT notes and bed availability,needs precert no PASRR needed               [facility] Declined  family chose other facility N/A [address] [facility] [place] [address] [phone]    [facility] Declined N/A [address] [facility] [place] [id number] [phone]       [facility] 10/17/2019 1004    Out of network with ins                 Katie Jordan, CAROW, LSW  [phone]

## 2019-10-21 NOTE — DISCHARGE SUMMARY
Date of Admission: 10/10/2019    Date of Discharge:  10/21/2019    Length of stay:  LOS: 11 days     Admission Diagnosis: Dysphagia    Principal and Active Diagnosis During Hospital Stay:   Dysphagia  Multiple failed attempts at Dobbhoff tube insertion  Failed to place NG tube in IR despite multiple attempts  GI placed PEG tube 10/14   on tube feed and  has reached goal     Acute kidney injury  Resolved with hydration     Chronic hypotension  Systolic averages 100-110  monitor BP     History of CAD s/p CABG  Followed outpatient by [facility]  No active angina        Chronic atrial fibrillation   rate controlled  Discontinued Lovenox and start back on Eliquis     Hyperlipidemia   On atorvastatin     Oral thrush/esophageal candidiasis  On fluconazole and  Liliana's Magic mouthwash     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  On Eliquis  Residual left-sided weakness  Continue baclofen for muscle spasm   Continue PT/OT  Repeat CT no acute findings     BPH  On  Flomax     Depression  on Zoloft     Vitamin D deficiency        History of squamous cell carcinoma     History of MI    Hospital Course  Patient is a 89 y.o. male presented with dysphagia.  He was admitted and multiple times to place a Dobbhoff failed.  Eventually he underwent PEG tube placement with gastroenterology.  He was started on tube feeds and was increased to goal and did well.  He was weak and needing rehab and ultimately was transferred to the rehabilitation facility for further care.      Procedures Performed:as noted     Procedure(s):  ESOPHAGOGASTRODUODENOSCOPY WITH PERCUTANEOUS ENDOSCOPIC GASTROSTOMY TUBE INSERTION  -------------------       Consults:   Consults     Date and Time Order Name Status Description    10/10/2019 1331 Hospitalist (on-call MD unless specified) Completed     10/10/2019 1155 Gastroenterology (on-call MD unless specified) Completed           Pertinent Test Results:     Lab Results (last 72 hours)     Procedure Component Value  Units Date/Time    POC Glucose Once [[id number]]  (Abnormal) Collected:  10/21/19 1341    Specimen:  Blood Updated:  10/21/19 1342     Glucose 114 mg/dL      Comment: Serial Number: [id number]:  [id number]       POC Glucose Once [[id number]]  (Abnormal) Collected:  10/21/19 0554    Specimen:  Blood Updated:  10/21/19 0556     Glucose 145 mg/dL      Comment: Serial Number: [id number]:  [id number]       Comprehensive Metabolic Panel [[id number]]  (Abnormal) Collected:  10/21/19 0253    Specimen:  Blood Updated:  10/21/19 0457     Glucose 133 mg/dL      BUN 32 mg/dL      Creatinine 0.74 mg/dL      Sodium 135 mmol/L      Potassium 4.5 mmol/L      Chloride 99 mmol/L      CO2 30.0 mmol/L      Calcium 9.0 mg/dL      Total Protein 6.1 g/dL      Albumin 2.50 g/dL      ALT (SGPT) 12 U/L      AST (SGOT) 28 U/L      Alkaline Phosphatase 122 U/L      Total Bilirubin 0.9 mg/dL      eGFR Non African Amer 98 mL/min/1.73      Globulin 3.6 gm/dL      A/G Ratio 0.7 g/dL      BUN/Creatinine Ratio 43.2     Anion Gap 6.0 mmol/L     Narrative:       GFR Normal >60  Chronic Kidney Disease <60  Kidney Failure <15    CBC & Differential [[id number]] Collected:  10/21/19 0253    Specimen:  Blood Updated:  10/21/19 0424    Narrative:       The following orders were created for panel order CBC & Differential.  Procedure                               Abnormality         Status                     ---------                               -----------         ------                     CBC Auto Differential[[id number]]        Abnormal            Final result                 Please view results for these tests on the individual orders.    CBC Auto Differential [[id number]]  (Abnormal) Collected:  10/21/19 0253    Specimen:  Blood Updated:  10/21/19 0424     WBC 6.30 10*3/mm3      RBC 4.22 10*6/mm3      Hemoglobin 13.6 g/dL      Hematocrit 38.9 %      MCV 92.0 fL      MCH 32.3 pg      MCHC 35.1 g/dL      RDW 16.1 %      RDW-SD 51.2 fl      MPV  8.1 fL      Platelets 170 10*3/mm3      Neutrophil % 74.5 %      Lymphocyte % 12.3 %      Monocyte % 11.5 %      Eosinophil % 1.3 %      Basophil % 0.4 %      Neutrophils, Absolute 4.70 10*3/mm3      Lymphocytes, Absolute 0.80 10*3/mm3      Monocytes, Absolute 0.70 10*3/mm3      Eosinophils, Absolute 0.10 10*3/mm3      Basophils, Absolute 0.00 10*3/mm3      nRBC 0.1 /100 WBC     POC Glucose Once [[id number]]  (Abnormal) Collected:  10/21/19 0033    Specimen:  Blood Updated:  10/21/19 0039     Glucose 126 mg/dL      Comment: Serial Number: [id number]:  [id number]       POC Glucose Once [[id number]]  (Abnormal) Collected:  10/20/19 1806    Specimen:  Blood Updated:  10/20/19 1807     Glucose 132 mg/dL      Comment: Serial Number: [id number]:  [id number]       POC Glucose Once [[id number]]  (Abnormal) Collected:  10/20/19 1300    Specimen:  Blood Updated:  10/20/19 1301     Glucose 117 mg/dL      Comment: Serial Number: [id number]:  [id number]       Comprehensive Metabolic Panel [[id number]]  (Abnormal) Collected:  10/20/19 0359    Specimen:  Blood Updated:  10/20/19 0758     Glucose 101 mg/dL      BUN 32 mg/dL      Creatinine 0.80 mg/dL      Sodium 135 mmol/L      Potassium 4.8 mmol/L      Chloride 100 mmol/L      CO2 29.0 mmol/L      Calcium 9.2 mg/dL      Total Protein 6.2 g/dL      Albumin 2.50 g/dL      ALT (SGPT) 14 U/L      AST (SGOT) 32 U/L      Alkaline Phosphatase 126 U/L      Total Bilirubin 0.9 mg/dL      eGFR Non African Amer 90 mL/min/1.73      Globulin 3.7 gm/dL      A/G Ratio 0.7 g/dL      BUN/Creatinine Ratio 40.0     Anion Gap 6.0 mmol/L     Narrative:       GFR Normal >60  Chronic Kidney Disease <60  Kidney Failure <15    POC Glucose Once [[id number]]  (Abnormal) Collected:  10/20/19 0619    Specimen:  Blood Updated:  10/20/19 0620     Glucose 109 mg/dL      Comment: Serial Number: [id number]:  [id number]       POC Glucose Once [[id number]]  (Abnormal) Collected:  10/19/19 0303     Specimen:  Blood Updated:  10/20/19 0000     Glucose 127 mg/dL      Comment: Serial Number: [id number]:  [id number]       POC Glucose Once [[id number]]  (Abnormal) Collected:  10/19/19 2053    Specimen:  Blood Updated:  10/19/19 2055     Glucose 116 mg/dL      Comment: Serial Number: [id number]:  [id number]       POC Glucose Once [[id number]]  (Abnormal) Collected:  10/19/19 1734    Specimen:  Blood Updated:  10/19/19 1736     Glucose 129 mg/dL      Comment: Serial Number: [id number]:  [id number]       POC Glucose Once [[id number]]  (Abnormal) Collected:  10/19/19 1211    Specimen:  Blood Updated:  10/19/19 1212     Glucose 147 mg/dL      Comment: Serial Number: [id number]:  [id number]       POC Glucose Once [[id number]]  (Abnormal) Collected:  10/19/19 0604    Specimen:  Blood Updated:  10/19/19 0605     Glucose 123 mg/dL      Comment: Serial Number: [id number]:  [id number]       Comprehensive Metabolic Panel [[id number]]  (Abnormal) Collected:  10/19/19 0426    Specimen:  Blood Updated:  10/19/19 0516     Glucose 128 mg/dL      BUN 30 mg/dL      Creatinine 0.78 mg/dL      Sodium 135 mmol/L      Potassium 4.7 mmol/L      Chloride 100 mmol/L      CO2 30.0 mmol/L      Calcium 8.9 mg/dL      Total Protein 6.2 g/dL      Albumin 2.60 g/dL      ALT (SGPT) 13 U/L      AST (SGOT) 29 U/L      Alkaline Phosphatase 130 U/L      Total Bilirubin 0.9 mg/dL      eGFR Non African Amer 93 mL/min/1.73      Globulin 3.6 gm/dL      A/G Ratio 0.7 g/dL      BUN/Creatinine Ratio 38.5     Anion Gap 5.0 mmol/L     Narrative:       GFR Normal >60  Chronic Kidney Disease <60  Kidney Failure <15             No results found for: BLOODCX   No results found for: URINECX   No results found for: WOUNDCX   No results found for: RESPCX   No results found for: STOOLCX   No results found for: STOOLCXY   No results found for: MRSACX   No results found for: VRECX   No results found for: CRECX   No components found for:  AFBSTAINCX   No results found for: AFBCX   No results found for: AFBCXBLD   No results found for: FUNGUSCX   No components found for: GMSSTAIN   No results found for: KOHPREP   No results found for: ANACX   No results found for: BODYFLDCX   No results found for: CSFCX   No results found for: CULTURE   No results found for: THROATCX   No results found for: THROATCXBS   No results found for: ICECX   No results found for: DICECX   No results found for: GCCX           Imaging Results (all)     Procedure Component Value Units Date/Time    FL Video Swallow With Speech [[id number]] Collected:  10/16/19 1500     Updated:  10/16/19 1503    Narrative:       DATE OF EXAM:  10/16/2019 2:28 PM     PROCEDURE:  FL VIDEO SWALLOW W SPEECH-     INDICATIONS:  dysphagia; R13.10-Dysphagia, unspecified; R13.12-Dysphagia,  oropharyngeal phase     COMPARISON:  None available     TECHNIQUE:   This examination was performed in conjunction with speech pathology.  Lateral video fluoroscopic evaluation of the swallowing mechanism was  performed while correlate administering to the patient various  consistency food items mixed with barium.     Fluoroscopic Time:   4.2  minutes     FINDINGS:  There is aspiration with thin barium contrast with straw. There is  significant delayed initiation of swallowing.       Impression:       As above. Please see speech pathology report for detailed evaluation and  dietary recommendations.     Electronically Signed By-Antonio Pearson On:10/16/2019 3:01 PM  This report was finalized on [id number] by  Antonio Pearson, .    XR Chest 1 View [[id number]] Collected:  10/11/19 2025     Updated:  10/11/19 2032    Narrative:       Examination: XR CHEST 1 VW-     Date of Exam: 10/11/2019 7:50 PM     Indication: picc line placement; R13.10-Dysphagia, unspecified;  R13.12-Dysphagia, oropharyngeal phase.       Comparison: 10/10/2019     Technique: 1 view of the chest      FINDINGS:  There is a right-sided PICC line with  its tip in the SVC. It looks like  the distal end of the tube is seen in on, without significant extension  of the tube superiorly. Heart size is enlarged. There has been a  coronary artery bypass. There is no airspace opacity today; the left  lung appears clear. No pleural effusion is noted. No bone lesion is  seen.       Impression:       1. The PICC line tip is in the SVC. The distal end may be directed  anteriorly or posteriorly within the cava. It does not appear to be  significantly folded or looped.  2. Cardiomegaly. No evidence of active lung disease.     Electronically Signed By-Nya Baez On:10/11/2019 8:30 PM  This report was finalized on [id number] by  Nya Baez .    FL GI Tube Placement [[id number]] Collected:  10/11/19 1509     Updated:  10/11/19 1512    Narrative:       DATE OF EXAM:  10/11/2019 2:12 PM     PROCEDURE:  FL GI TUBE PLACEMENT-     INDICATIONS:  DOBHOFF TUBE PLACEMENT D/T DYSPHAGIA; R13.10-Dysphagia, unspecified;  R13.12-Dysphagia, oropharyngeal phase     COMPARISON:  Chest 2 views 10/10/2019     TECHNIQUE:   A Naso- or graciela-gastric tube was placed in the radiology department.   Digital films were obtained for confirmation     FINDINGS:  Multiple attempts were made to place a nasogastric tube under  fluoroscopic guidance. Despite repeated attempts, the tube was not able  to be placed successfully in the stomach. Total fluoroscopy time 6.58  minutes.       Impression:       Nasogastric tube placement under fluoroscopy unsuccessful despite  multiple attempts.     Electronically Signed By-Rodolfo Pearce On:10/11/2019 3:10 PM  This report was finalized on [id number] by  Rodolfo Pearce .    XR Chest PA & Lateral [[id number]] Collected:  10/10/19 2205     Updated:  10/10/19 2211    Narrative:       Examination: XR CHEST PA AND LATERAL-     Date of Exam: 10/10/2019 7:45 PM     Indication: rule out aspiration pneumonia; R13.10-Dysphagia,  unspecified; R13.12-Dysphagia, oropharyngeal  phase.     Comparison: 03/22/2019     Technique: 2 radiographic views of the chest were obtained.     Findings:  There is mild cardiomegaly. There has been a median sternotomy and  coronary artery bypass. There is rotation and tortuosity of the aorta.  Dilatation of the aorta is not excluded. There is mild airspace opacity  in the left lower lung. Right lung is clear. No significant bone  abnormality is noted.       Impression:       1. Mild left basal airspace opacity could be due to pneumonia or  aspiration.  2. Cardiomegaly.  3. Aortic calcification and tortuosity. Aortic dilatation is not  excluded.     Electronically Signed By-Nya Baez On:10/10/2019 10:09 PM  This report was finalized on [id number] by  Nya Baez, .    CT Head Without Contrast [[id number]] Collected:  10/10/19 1634     Updated:  10/10/19 1640    Narrative:          DATE OF EXAM:  10/10/2019 3:36 PM     PROCEDURE:   CT HEAD WO CONTRAST-     INDICATIONS:   Focal neuro deficit, difficulty swallowing.     COMPARISON:  No Comparisons Available     TECHNIQUE:   Routine transaxial cuts were obtained through the head without the  administration of contrast. Automated exposure control and iterative  reconstruction methods were used.      FINDINGS:  There is enlargement of the cerebral sulci, fissures, ventricles, and  basal cisterns consistent with advanced cerebral atrophy. There are  areas of decreased density in the white matter tracks which is  nonspecific, but usually indicates chronic microvascular ischemia. There  are old lacunar infarcts seen in the basal ganglia bilaterally. There is  no acute hemorrhage, midline shift, or suspicious extra-axial fluid  collections. The orbital contents are normal. The paranasal and mastoid  sinuses are clear.        Impression:          1. Advanced cerebral atrophy.  2. Chronic ischemic changes.  3. No acute findings.     Electronically Signed By-Juancho Parham On:10/10/2019 4:38 PM  This report was  finalized on [id number] by  Juancho Parham, .            Condition on Discharge:  Chronically ill but stable     Vital Signs  Temp:  [97.5 °F (36.4 °C)-97.7 °F (36.5 °C)] 97.7 °F (36.5 °C)  Heart Rate:  [64-67] 64  Resp:  [16-17] 17  BP: (115-127)/(63-72) 115/63    Physical Exam:  Physical Exam   Constitutional: No distress.   Eyes: Pupils are equal, round, and reactive to light.   Cardiovascular: Normal rate.   Pulmonary/Chest: Effort normal.   Abdominal:   PEG in place   Neurological: He is alert.   Skin: Skin is warm.       Discharge Disposition  Rehab Facility or Unit (DC - External)    Discharge Medications     Discharge Medications      New Medications      Instructions Start Date   lansoprazole 3 MG/ML suspension oral suspension   30 mg, Per G Tube, Every Morning   Start Date:  10/22/2019        Continue These Medications      Instructions Start Date   acetaminophen 325 MG tablet  Commonly known as:  TYLENOL   Oral, Every 6 Hours PRN      apixaban 5 MG tablet tablet  Commonly known as:  ELIQUIS   5 mg, Oral, 2 Times Daily      atorvastatin 10 MG tablet  Commonly known as:  LIPITOR   5 mg, Oral, Nightly      bacitracin 500 UNIT/GM ointment   1 each, Topical, Daily      baclofen 10 MG tablet  Commonly known as:  LIORESAL   10 mg, Oral, Nightly PRN      BENEPROTEIN PO   1 packet, Oral, 2 Times Daily With Meals      bisacodyl 10 MG suppository  Commonly known as:  DULCOLAX   10 mg, Rectal, Daily PRN      cholecalciferol 1000 units tablet  Commonly known as:  VITAMIN D3   1,000 Units, Oral, Daily      fluconazole 100 MG tablet  Commonly known as:  DIFLUCAN   100 mg, Oral, Daily      magic mouthwash oral suspension   5 mL, Swish & Spit, Every 6 Hours PRN, Liliana's Mouthwash       magnesium hydroxide 2400 MG/10ML suspension suspension  Commonly known as:  MILK OF MAGNESIA   30 mL, Oral, Daily PRN      melatonin 3 MG tablet   3 mg, Oral, Nightly PRN      ondansetron 4 MG tablet  Commonly known as:  ZOFRAN   4 mg, Oral,  Every 4 Hours PRN      potassium chloride 10 MEQ CR capsule  Commonly known as:  MICRO-K   20 mEq, Oral, 2 Times Daily      sertraline 25 MG tablet  Commonly known as:  ZOLOFT   25 mg, Oral, Nightly      tamsulosin 0.4 MG capsule 24 hr capsule  Commonly known as:  FLOMAX   1 capsule, Oral, Nightly      trolamine salicylate 10 % cream  Commonly known as:  ASPERCREME   1 application, Topical, 4 Times Daily PRN      jc's butt cream   1 application, Topical, 2 Times Daily PRN         Stop These Medications    pantoprazole 40 MG EC tablet  Commonly known as:  PROTONIX            Discharge Diet:   Diet Instructions     Diet: Tube Feeding; Continuous; nutren 1.5 goal 75 ml/hr      Discharge Diet:  Tube Feeding    Feeding Type:  Continuous    Formula & Rate:  nutren 1.5 goal 75 ml/hr          Activity at Discharge:   Activity Instructions     Activity as Tolerated            Follow-up Appointments  Future Appointments   Date Time Provider Department Center   12/18/2019  9:00 AM ARMIDA Ayala MD [facility] None     Additional Instructions for the Follow-ups that You Need to Schedule     Discharge Follow-up with PCP   As directed       Currently Documented PCP:    Devon Arriaza Jr., MD    PCP Phone Number:    [phone]     Follow Up Details:  one week               Test Results Pending at Discharge       Risk for Readmission (LACE) Score: 10 (10/21/2019  6:00 AM)          Time: Discharge 36 min with face-to-face history exam, writing of prescriptions, and documenting discharge data including care coordination.      Jonah Johnson,   10/21/19  4:00 PM

## 2019-10-21 NOTE — THERAPY TREATMENT NOTE
Acute Care - Occupational Therapy Treatment Note  [facility]     Patient Name: Anuj Escobedo  : 1925  MRN: [id number]  Today's Date: 10/21/2019             Admit Date: 10/10/2019       ICD-10-CM ICD-9-CM   1. Dysphagia, unspecified type R13.10 787.20   2. Oropharyngeal dysphagia R13.12 787.22     Patient Active Problem List   Diagnosis   • Dysphagia   • Atrial fibrillation, chronic   • Dyslipidemia   • Degenerative arthritis   • Coronary artery disease   • Benign prostatic hyperplasia   • Hypertension   • Long term current use of anticoagulant therapy   • Mixed hyperlipidemia   • Peripheral edema   • Primary malignant neoplasm of head (CMS/HCC)   • Shingles   • Oropharyngeal dysphagia   • Abrasions of multiple sites     Past Medical History:   Diagnosis Date   • CAD (coronary artery disease)     s/p CABG  x single vessel   • Chronic atrial fibrillation    • Dyslipidemia    • ED (erectile dysfunction)    • HTN (hypertension)    • Long term current use of anticoagulant therapy    • Myocardial infarction (CMS/HCC)    • Shingles 2017   • TIA (transient ischemic attack)      Past Surgical History:   Procedure Laterality Date   • CARDIAC CATHETERIZATION  1993    RCA with subtotal occlusion with residual thrombus formation at very proximal portion; Lad first diagonal branch with proximal 50% stenosis   • CARPAL TUNNEL RELEASE     • CORONARY ARTERY BYPASS GRAFT  1993    single coronary artery bypass using saphenous vein to the right coronary artery   • ENDOSCOPY W/ PEG TUBE PLACEMENT N/A 10/14/2019    Procedure: ESOPHAGOGASTRODUODENOSCOPY WITH PERCUTANEOUS ENDOSCOPIC GASTROSTOMY TUBE INSERTION;  Surgeon: Cathryn Glynn MD;  Location: [facility];  Service: Gastroenterology   • INGUINAL HERNIA REPAIR Right 2015    (has had total of 3 hernia repairs)   • ORTHOPEDIC SURGERY         Therapy Treatment    Rehabilitation Treatment Summary     Row Name 10/21/19 1000             Treatment  Time/Intention    Discipline  occupational therapist  -ES,KB,ES2      Document Type  therapy note (daily note)  -ES,KB,ES2      Subjective Information  no complaints  -ES,KB,ES2      Mode of Treatment  individual therapy  -ES,KB,ES2      Patient/Family Observations  Pt supine in bed with HR monitor in place and tube feed running  -ES,KB,ES2      Therapy Frequency (OT Eval)  3 times/wk  -ES,KB,ES2      Patient Effort  good  -ES,KB,ES2      Recorded by [ES,KB,ES2] Yu Grimes OT (r) Tash Waggoner OT Student (t) Yu Grimes OT (c) 10/21/19 1042      Row Name 10/21/19 1000             Vital Signs    Pretreatment Heart Rate (beats/min)  88  -ES,KB,ES2      Posttreatment Heart Rate (beats/min)  86  -ES,KB,ES2      Pre SpO2 (%)  97  -ES,KB,ES2      O2 Delivery Pre Treatment  room air  -ES,KB,ES2      Post SpO2 (%)  98  -ES,KB,ES2      O2 Delivery Post Treatment  room air  -ES,KB,ES2      Pre Patient Position  Supine  -ES,KB,ES2      Intra Patient Position  Standing  -ES,KB,ES2      Post Patient Position  Sitting  -ES,KB,ES2      Recorded by [ES,KB,ES2] Yu Grimes OT (r) Tash Waggoner, OT Student (t) Yu Grimes OT (c) 10/21/19 1042      Row Name 10/21/19 1000             Cognitive Assessment/Intervention- PT/OT    Orientation Status (Cognition)  oriented x 4  -ES,KB,ES2      Recorded by [ES,KB,ES2] Yu Grimes OT (r) Tash Waggoner OT Student (t) Yu Grimes OT (c) 10/21/19 1042      Row Name 10/21/19 1000             Bed Mobility Assessment/Treatment    Bed Mobility Assessment/Treatment  supine-sit  -ES,KB,ES2      [place] Level (Bed Mobility)  moderate assist (50% patient effort);2 person assist  -ES,KB,ES2      Supine-Sit [place] (Bed Mobility)  moderate assist (50% patient effort);2 person assist  -ES,KB,ES2      Recorded by [YOLY [place],ES2] Yu Grimes OT (r) Tash Waggoner OT Student (t) Yu Grimes OT (c) 10/21/19 1043       Row Name 10/21/19 1000             Transfer Assessment/Treatment    Transfer Assessment/Treatment  sit-stand transfer;stand-sit transfer  -ES,KB,ES2      Comment (Transfers)  with Karma lift  -ES,KB,ES2      Recorded by [ES,KB,ES2] Yu Grimes OT (r) Tash Waggoner, OT Student (t) Yu Grimes OT (c) 10/21/19 1042      Row Name 10/21/19 1000             Bed-Chair Transfer    Bed-Chair [place] (Transfers)  dependent (less than 25% patient effort);2 person assist  -ES,KB,ES2      Assistive Device (Bed-Chair Transfers)  mechanical lift/aid  -ES,KB,ES2      Recorded by [ES,KB,ES2] Yu Grimes OT (r) Tash Waggoner, OT Student (t) Yu Grimes OT (c) 10/21/19 1042      Row Name 10/21/19 1000             Sit-Stand Transfer    Sit-Stand [place] (Transfers)  2 person assist;dependent (less than 25% patient effort)  -ES,KB,ES2      Assistive Device (Sit-Stand Transfers)  mechanical lift/aid  -ES,KB,ES2      Recorded by [ES,KB,ES2] Yu Grimes OT (r) Tash Waggoner, OT Student (t) Yu Grimes OT (c) 10/21/19 1042      Row Name 10/21/19 1000             Stand-Sit Transfer    Stand-Sit [place] (Transfers)  dependent (less than 25% patient effort);2 person assist  -ES,KB,ES2      Assistive Device (Stand-Sit Transfers)  mechanical lift/aid  -ES,KB,ES2      Recorded by [ES,KB,ES2] Yu Grimes OT (r) Tash Waggoner, OT Student (t) Yu Grimes OT (c) 10/21/19 1042      Row Name 10/21/19 1000             ADL Assessment/Intervention    BADL Assessment/Intervention  grooming  -ES,KB,ES2      Recorded by [ES,KB,ES2] Yu Grimes OT (r) Tash Waggoner, OT Student (t) Yu Grimes OT (c) 10/21/19 1042      Row Name 10/21/19 1000             Grooming Assessment/Training    [place] Level (Grooming)  wash face, hands;minimum assist (75% patient effort)  -YOLY NJ,ES2      Grooming Position  supported sitting  -YOLY NJ,ES2       Recorded by [ES,KB,ES2] Yu Grimes OT (r) Tash Waggoner OT Student (t) Yu Grimes OT (c) 10/21/19 1042      Row Name 10/21/19 1000             BADL Safety/Performance    Impairments, BADL Safety/Performance  balance;endurance/activity tolerance;coordination;motor control;motor planning;muscle tone abnormality  -ES,KB,ES2      Skilled BADL Treatment/Intervention  compensatory training;energy conservation  -ES,KB,ES2      Recorded by [ES,KB,ES2] Yu Grimes OT (r) Tash Waggoner, OT Student (t) Yu Grmies OT (c) 10/21/19 1042      Row Name 10/21/19 1000             Motor Skills Assessment/Interventions    Additional Documentation  Balance (Group)  -ES,KB,ES2      Recorded by [ES,KB,ES2] Yu Grimes OT (r) Tash Waggoner, OT Student (t) Yu Grimes OT (c) 10/21/19 1042      Row Name 10/21/19 1000             Balance    Balance  static sitting balance;static standing balance  -ES,KB,ES2      Recorded by [ES,KB,ES2] Yu Grimes OT (r) Tash Waggoner, OT Student (t) Yu Grimes OT (c) 10/21/19 1042      Row Name 10/21/19 1000             Static Sitting Balance    Level of [place] (Unsupported Sitting, Static Balance)  moderate assist, 50 to 74% patient effort  -ES,KB,ES2      Sitting Position (Unsupported Sitting, Static Balance)  sitting on edge of bed  -ES,KB,ES2      Time Able to Maintain Position (Unsupported Sitting, Static Balance)  2 to 3 minutes  -ES,KB,ES2      Recorded by [ES,KB,ES2] Yu Grimes, OT (r) Tash Waggoner, OT Student (t) Yu Grimes, OT (c) 10/21/19 1042      Row Name 10/21/19 1000             Static Standing Balance    Level of [place] (Supported Standing, Static Balance)  2 person assist;dependent, less than 25% patient effort  -YOLY NJ,ONDINA2      Time Able to Maintain Position (Supported Standing, Static Balance)  45 to 60 seconds  -YOLY NJ ES2      Assistive Device Utilized (Supported  Standing, Static Balance)  other (see comments)  -ES,KB,ES2      Recorded by [ES,KB,ES2] Yu Grimes OT (r) Tash Waggoner, OT Student (t) Yu Grimes OT (c) 10/21/19 1042      Row Name 10/21/19 1000             Positioning and Restraints    Pre-Treatment Position  in bed  -ES,KB,ES2      Post Treatment Position  chair  -ES,KB,ES2      In Chair  notified nsg;sitting;call light within reach;encouraged to call for assist;exit alarm on  -ES,KB,ES2      Recorded by [ES,KB,ES2] Yu Grimes OT (r) Tash Waggoner, OT Student (t) Yu Grimes OT (c) 10/21/19 1042      Row Name 10/21/19 1000             Pain Scale: Numbers Pre/Post-Treatment    Pain Scale: Numbers, Pretreatment  0/10 - no pain  -ES,KB,ES2      Pain Scale: Numbers, Post-Treatment  0/10 - no pain  -ES,KB,ES2      Recorded by [ES,KB,ES2] Yu Grimes OT (r) Tash Waggoner, OT Student (t) Yu Grimes OT (c) 10/21/19 1042      Row Name 10/21/19 1000             Health Promotion    Additional Documentation  Coping (Group)  -ES,KB,ES2      Recorded by [ES,KB,ES2] Yu Grimes OT (r) Tash Waggoner, OT Student (t) Yu Grimes OT (c) 10/21/19 1042      Row Name                Wound 10/10/19 1809 Left anterior;lower leg Abrasion    Wound - Properties Group Date first assessed: 10/10/19 [CR] Time first assessed: 1809 [CR] Side: Left [CR] Orientation: anterior;lower [CR] Location: leg [CR] Primary Wound Type: Abrasion [CR] Recorded by:  [CR] Marzena Tolentino RN 10/10/19 1809    Row Name                Wound 10/10/19 1809 Right gluteal Skin Tear    Wound - Properties Group Date first assessed: 10/10/19 [CR] Time first assessed: 1809 [CR] Side: Right [CR] Location: gluteal [CR] Primary Wound Type: Skin tear [CR] Recorded by:  [CR] Marzena Tolentino RN 10/10/19 1809    Row Name                Wound 10/10/19 1810 Right gluteal Abrasion    Wound - Properties Group Date first assessed: 10/10/19  [CR] Time first assessed: 1810 [CR] Side: Right [CR] Location: gluteal [CR] Primary Wound Type: Abrasion [CR] Recorded by:  [CR] Marzena Tolentino RN 10/10/19 1810    Row Name 10/21/19 1000             Coping    Observed Emotional State  calm;cooperative;pleasant  -ES,KB,ES2      Verbalized Emotional State  acceptance  -ES,KB,ES2      Recorded by [ES,KB,ES2] Yu Grimes OT (r) Tash Waggoner, OT Student (t) Yu Grimes OT (c) 10/21/19 1042      Row Name 10/21/19 1000             Plan of Care Review    Plan of Care Reviewed With  patient  -ES,KB,ES2      Recorded by [ES,KB,ES2] Yu Grimes OT (r) Tash Waggoner, OT Student (t) Yu Grimes OT (c) 10/21/19 1042      Row Name 10/21/19 1000             Outcome Summary/Treatment Plan (OT)    Daily Summary of Progress (OT)  progress towards functional goals is fair  -ES,KB,ES2      Anticipated Discharge Disposition (OT)  inpatient rehabilitation facility  -ES,KB,ES2      Recorded by [ES,KB,ES2] Yu Grimes OT (r) Tash Waggoner OT Student (t) Yu Grimes, OT (c) 10/21/19 1042        User Key  (r) = Recorded By, (t) = Taken By, (c) = Cosigned By    Initials Name Effective Dates Discipline    ES Yu Grimes OT 03/01/19 -  OT    CR Marzena Tolentino RN 03/01/19 -  Nurse    Tash Granados OT Student 07/08/19 -  OT        Wound 10/10/19 1809 Left anterior;lower leg Abrasion (Active)   Dressing Appearance dried drainage;intact 10/20/2019  9:45 PM   Closure Adhesive bandage 10/20/2019  9:45 PM   Base dressing in place, unable to visualize 10/20/2019  9:45 PM   Periwound Temperature warm 10/20/2019  9:45 PM   Periwound Skin Turgor firm 10/20/2019  9:45 PM   Drainage Amount none 10/20/2019  9:45 PM   Dressing Care, Wound dressing reinforced 10/20/2019  9:45 PM   Wound Output (mL) 0 10/20/2019  9:45 PM       Wound 10/10/19 1809 Right gluteal Skin Tear (Active)   Dressing Appearance dry;intact 10/21/2019   6:29 AM   Closure Adhesive bandage 10/21/2019  6:29 AM   Base blanchable;red 10/21/2019  6:29 AM   Periwound intact;dry 10/21/2019  6:29 AM   Periwound Temperature warm 10/21/2019  6:29 AM   Periwound Skin Turgor firm 10/21/2019  6:29 AM   Drainage Amount none 10/21/2019  6:29 AM   Care, Wound barrier applied 10/21/2019  6:29 AM   Dressing Care, Wound dressing changed 10/21/2019  6:29 AM   Periwound Care, Wound absorptive dressing applied;barrier ointment applied 10/21/2019  6:29 AM   Wound Output (mL) 0 10/21/2019  6:29 AM       Wound 10/10/19 1810 Right gluteal Abrasion (Active)   Dressing Appearance dry;intact 10/20/2019  9:45 PM   Closure Adhesive bandage 10/20/2019  9:45 PM   Base dressing in place, unable to visualize 10/20/2019  9:45 PM   Periwound intact;dry 10/20/2019  9:45 PM   Periwound Temperature warm 10/20/2019  9:45 PM   Drainage Amount none 10/20/2019  9:45 PM   Dressing Care, Wound dressing reinforced 10/20/2019  9:45 PM   Wound Output (mL) 0 10/20/2019  9:45 PM       Occupational Therapy Education     Title: PT OT SLP Therapies (Done)     Topic: Occupational Therapy (Done)     Point: ADL training (Done)     Description: Instruct learner(s) on proper safety adaptation and remediation techniques during self care or transfers.   Instruct in proper use of assistive devices.    Learning Progress Summary           Patient Acceptance, E,TB, VU by KB at 10/21/2019 10:19 AM    Comment:  Pt educated on proper hand and foot placement during functional transfers and bed mobility. Pt educated on energy conservation during ADLs.    Acceptance, E, VU by ALMA at 10/20/2019  9:50 AM    Acceptance, E, VU by COLEEN at 10/20/2019  1:02 AM    Acceptance, E, VU by FERNANDO at 10/18/2019  4:42 PM    Acceptance, E, VU by RH at 10/16/2019  3:33 PM   Family Acceptance, E, VU by ALMA at 10/20/2019  9:50 AM    Acceptance, E, VU by QUINCY at 10/16/2019  3:33 PM                   Point: Home exercise program (Done)     Description: Instruct  learner(s) on appropriate technique for monitoring, assisting and/or progressing therapeutic exercises/activities.    Learning Progress Summary           Patient Acceptance, E, VU by ALMA at 10/20/2019  9:50 AM    Acceptance, E, VU by COLEEN at 10/20/2019  1:02 AM    Acceptance, E, VU by BR at 10/18/2019  4:42 PM    Acceptance, E, VU by RH at 10/16/2019  3:33 PM   Family Acceptance, E, VU by DK at 10/20/2019  9:50 AM    Acceptance, E, VU by RH at 10/16/2019  3:33 PM                   Point: Precautions (Done)     Description: Instruct learner(s) on prescribed precautions during self-care and functional transfers.    Learning Progress Summary           Patient Acceptance, E, VU by ALMA at 10/20/2019  9:50 AM    Acceptance, E, VU by COLEEN at 10/20/2019  1:02 AM    Acceptance, E, VU by BR at 10/18/2019  4:42 PM    Acceptance, E, VU by RH at 10/16/2019  3:33 PM   Family Acceptance, E, VU by ALMA at 10/20/2019  9:50 AM    Acceptance, E, VU by RH at 10/16/2019  3:33 PM                   Point: Body mechanics (Done)     Description: Instruct learner(s) on proper positioning and spine alignment during self-care, functional mobility activities and/or exercises.    Learning Progress Summary           Patient Acceptance, E,TB, VU by  at 10/21/2019 10:19 AM    Comment:  Pt educated on proper hand and foot placement during functional transfers and bed mobility. Pt educated on energy conservation during ADLs.    Acceptance, E, VU by ALMA at 10/20/2019  9:50 AM    Acceptance, E, VU by COLEEN at 10/20/2019  1:02 AM    Acceptance, E, VU by FERNANDO at 10/18/2019  4:42 PM    Acceptance, E, VU by  at 10/16/2019  3:33 PM    Delroy TB, DU by  at 10/15/2019  1:34 PM   Family Acceptance, E, VU by ALMA at 10/20/2019  9:50 AM    Acceptance, E, VU by  at 10/16/2019  3:33 PM    Eager, TB, DU by  at 10/15/2019  1:34 PM                               User Key     Initials Effective Dates Name Provider Type Discipline     03/01/19 -  Kaye Robertson, OT  Occupational Therapist OT    RH 03/04/19 -  Ivana Wellington, RN Registered Nurse Nurse    DK 03/01/19 -  Chanel Rodríguez, RN Registered Nurse Nurse    BR 03/01/19 -  Sherri Salas RN Registered Nurse Nurse    KW 06/24/19 -  Vishal Oliveira LPN Licensed Nurse Nurse    YOLY 07/08/19 -  Tash Waggoner OT Student OT Student OT                OT Recommendation and Plan  Outcome Summary/Treatment Plan (OT)  Daily Summary of Progress (OT): progress towards functional goals is fair  Anticipated Discharge Disposition (OT): inpatient rehabilitation facility  Therapy Frequency (OT Eval): 3 times/wk  Daily Summary of Progress (OT): progress towards functional goals is fair  Plan of Care Review  Plan of Care Reviewed With: patient  Plan of Care Reviewed With: patient  Outcome Summary: Pt completes bed mobility with Mod A x 2 and requires Mod A to maintain upright posture sitting EOB. Pt requires Karma lift to complete functional transfers and requires Min A to complete grooming. 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 Pt is making gradual progress towards functional goals, however is still limited by decreased strength and decreases use of UE and LE. OT continues to recommend IP rehab upon discharge as pt is not safe to return home at this time. OT will follow up with pt 3x/week to monitor and assess progress.        Time Calculation:   Time Calculation- OT     Row Name 10/21/19 1019             Time Calculation- OT    OT Start Time  0859  -ES (r) KB (t) ES (c)      OT Stop Time  0916  -ES (r) KB (t) ES (c)      OT Time Calculation (min)  17 min  -ES (r) KB (t)      Total Timed Code Minutes- OT  17 minute(s)  -ES (r) KB (t) ES (c)      OT Received On  10/21/19  -ES (r) KB (t) ES (c)      OT - Next Appointment  10/23/19  -ES (r) KB (t) ES (c)        User Key  (r) = Recorded By, (t) = Taken By, (c) = Cosigned By     Initials Name Provider Type    Yu Brito, OT Occupational Therapist    Tash Granados, OT Student OT Student        Therapy Charges for Today     Code Description Service Date Service Provider Modifiers Qty    [id number]  OT THERAPEUTIC ACT EA 15 MIN 10/21/2019 Tash Waggoner, OT Student GO 1    [id number]  OT SELF CARE/MGMT/TRAIN EA 15 MIN 10/21/2019 Tash Waggoner, OT Student GO 1               Tash Waggoner OT Student  10/21/2019

## 2019-10-21 NOTE — PLAN OF CARE
Problem: Patient Care Overview  Goal: Plan of Care Review   10/21/19 1014   Coping/Psychosocial   Plan of Care Reviewed With patient   Plan of Care Review   Progress fair   OTHER   Outcome Summary Pt completes bed mobility with Mod A x 2 and requires Mod A to maintain upright posture sitting EOB. Pt requires Karma lift to complete functional transfers and requires Min A to complete grooming. 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 Pt is making gradual progress towards functional goals, however is still limited by decreased strength and decreases use of UE and LE. OT continues to recommend IP rehab upon discharge as pt is not safe to return home at this time. OT will follow up with pt 3x/week to monitor and assess progress.

## 2019-10-22 NOTE — PROGRESS NOTES
Case Management Discharge Note                Final Discharge Disposition Code: 03 - skilled nursing facility (SNF)

## 2020-01-30 ENCOUNTER — ANTICOAGULATION VISIT (OUTPATIENT)
Dept: CARDIOLOGY | Facility: CLINIC | Age: 85
End: 2020-01-30

## 2020-07-07 ENCOUNTER — TRANSCRIBE ORDERS (OUTPATIENT)
Dept: LAB | Facility: HOSPITAL | Age: 85
End: 2020-07-07

## 2020-07-07 ENCOUNTER — LAB (OUTPATIENT)
Dept: LAB | Facility: HOSPITAL | Age: 85
End: 2020-07-07

## 2020-07-07 ENCOUNTER — OFFICE VISIT (OUTPATIENT)
Dept: WOUND CARE | Facility: HOSPITAL | Age: 85
End: 2020-07-07

## 2020-07-07 DIAGNOSIS — S61.209D OPEN WOUND OF FINGER OF LEFT HAND, SUBSEQUENT ENCOUNTER: Primary | ICD-10-CM

## 2020-07-07 DIAGNOSIS — S61.209D OPEN WOUND OF FINGER OF LEFT HAND, SUBSEQUENT ENCOUNTER: ICD-10-CM

## 2020-07-07 LAB
ALBUMIN SERPL-MCNC: 3.3 G/DL (ref 3.5–5.2)
ALBUMIN/GLOB SERPL: 0.9 G/DL
ALP SERPL-CCNC: 116 U/L (ref 39–117)
ALT SERPL W P-5'-P-CCNC: 22 U/L (ref 1–41)
ANION GAP SERPL CALCULATED.3IONS-SCNC: 8 MMOL/L (ref 5–15)
AST SERPL-CCNC: 28 U/L (ref 1–40)
BASOPHILS # BLD AUTO: 0.03 10*3/MM3 (ref 0–0.2)
BASOPHILS NFR BLD AUTO: 0.5 % (ref 0–1.5)
BILIRUB SERPL-MCNC: 1.2 MG/DL (ref 0–1.2)
BUN SERPL-MCNC: 18 MG/DL (ref 8–23)
BUN/CREAT SERPL: 22.8 (ref 7–25)
CALCIUM SPEC-SCNC: 9.8 MG/DL (ref 8.2–9.6)
CHLORIDE SERPL-SCNC: 98 MMOL/L (ref 98–107)
CO2 SERPL-SCNC: 27 MMOL/L (ref 22–29)
CREAT SERPL-MCNC: 0.79 MG/DL (ref 0.76–1.27)
CRP SERPL-MCNC: 0.89 MG/DL (ref 0–0.5)
DEPRECATED RDW RBC AUTO: 42.6 FL (ref 37–54)
EOSINOPHIL # BLD AUTO: 0.17 10*3/MM3 (ref 0–0.4)
EOSINOPHIL NFR BLD AUTO: 2.6 % (ref 0.3–6.2)
ERYTHROCYTE [DISTWIDTH] IN BLOOD BY AUTOMATED COUNT: 13.4 % (ref 12.3–15.4)
ERYTHROCYTE [SEDIMENTATION RATE] IN BLOOD: 30 MM/HR (ref 0–20)
GFR SERPL CREATININE-BSD FRML MDRD: 91 ML/MIN/1.73
GLOBULIN UR ELPH-MCNC: 3.5 GM/DL
GLUCOSE SERPL-MCNC: 87 MG/DL (ref 65–99)
HCT VFR BLD AUTO: 39.2 % (ref 37.5–51)
HGB BLD-MCNC: 13.2 G/DL (ref 13–17.7)
IMM GRANULOCYTES # BLD AUTO: 0.01 10*3/MM3 (ref 0–0.05)
IMM GRANULOCYTES NFR BLD AUTO: 0.2 % (ref 0–0.5)
LYMPHOCYTES # BLD AUTO: 1.09 10*3/MM3 (ref 0.7–3.1)
LYMPHOCYTES NFR BLD AUTO: 16.9 % (ref 19.6–45.3)
MCH RBC QN AUTO: 29.3 PG (ref 26.6–33)
MCHC RBC AUTO-ENTMCNC: 33.7 G/DL (ref 31.5–35.7)
MCV RBC AUTO: 86.9 FL (ref 79–97)
MONOCYTES # BLD AUTO: 0.79 10*3/MM3 (ref 0.1–0.9)
MONOCYTES NFR BLD AUTO: 12.2 % (ref 5–12)
NEUTROPHILS NFR BLD AUTO: 4.36 10*3/MM3 (ref 1.7–7)
NEUTROPHILS NFR BLD AUTO: 67.6 % (ref 42.7–76)
NRBC BLD AUTO-RTO: 0 /100 WBC (ref 0–0.2)
PLATELET # BLD AUTO: 225 10*3/MM3 (ref 140–450)
PMV BLD AUTO: 9.7 FL (ref 6–12)
POTASSIUM SERPL-SCNC: 4.2 MMOL/L (ref 3.5–5.2)
PROT SERPL-MCNC: 6.8 G/DL (ref 6–8.5)
RBC # BLD AUTO: 4.51 10*6/MM3 (ref 4.14–5.8)
SODIUM SERPL-SCNC: 133 MMOL/L (ref 136–145)
WBC # BLD AUTO: 6.45 10*3/MM3 (ref 3.4–10.8)

## 2020-07-07 PROCEDURE — 86140 C-REACTIVE PROTEIN: CPT

## 2020-07-07 PROCEDURE — 85025 COMPLETE CBC W/AUTO DIFF WBC: CPT

## 2020-07-07 PROCEDURE — 85652 RBC SED RATE AUTOMATED: CPT

## 2020-07-07 PROCEDURE — 36415 COLL VENOUS BLD VENIPUNCTURE: CPT

## 2020-07-07 PROCEDURE — 80053 COMPREHEN METABOLIC PANEL: CPT

## 2020-07-07 PROCEDURE — G0463 HOSPITAL OUTPT CLINIC VISIT: HCPCS

## 2020-07-13 ENCOUNTER — TRANSCRIBE ORDERS (OUTPATIENT)
Dept: ADMINISTRATIVE | Facility: HOSPITAL | Age: 85
End: 2020-07-13

## 2020-07-13 ENCOUNTER — OFFICE VISIT (OUTPATIENT)
Dept: WOUND CARE | Facility: HOSPITAL | Age: 85
End: 2020-07-13

## 2020-07-13 DIAGNOSIS — L89.894 PRESSURE INJURY OF OTHER SITE, STAGE 4 (HCC): Primary | ICD-10-CM

## 2020-07-13 PROCEDURE — G0463 HOSPITAL OUTPT CLINIC VISIT: HCPCS

## 2020-07-16 ENCOUNTER — APPOINTMENT (OUTPATIENT)
Dept: MRI IMAGING | Facility: HOSPITAL | Age: 85
End: 2020-07-16

## 2020-07-21 ENCOUNTER — OFFICE VISIT (OUTPATIENT)
Dept: WOUND CARE | Facility: HOSPITAL | Age: 85
End: 2020-07-21

## 2020-07-21 PROCEDURE — G0463 HOSPITAL OUTPT CLINIC VISIT: HCPCS

## 2020-08-04 ENCOUNTER — OFFICE VISIT (OUTPATIENT)
Dept: WOUND CARE | Facility: HOSPITAL | Age: 85
End: 2020-08-04

## 2020-08-04 PROCEDURE — G0463 HOSPITAL OUTPT CLINIC VISIT: HCPCS

## 2020-08-11 ENCOUNTER — OFFICE VISIT (OUTPATIENT)
Dept: WOUND CARE | Facility: HOSPITAL | Age: 85
End: 2020-08-11

## 2020-08-11 PROCEDURE — G0463 HOSPITAL OUTPT CLINIC VISIT: HCPCS

## 2020-08-18 ENCOUNTER — OFFICE VISIT (OUTPATIENT)
Dept: WOUND CARE | Facility: HOSPITAL | Age: 85
End: 2020-08-18

## 2020-08-18 PROCEDURE — G0463 HOSPITAL OUTPT CLINIC VISIT: HCPCS

## 2020-08-28 ENCOUNTER — OFFICE (OUTPATIENT)
Dept: URBAN - METROPOLITAN AREA CLINIC 64 | Facility: CLINIC | Age: 85
End: 2020-08-28

## 2020-08-28 VITALS
SYSTOLIC BLOOD PRESSURE: 147 MMHG | HEIGHT: 68 IN | DIASTOLIC BLOOD PRESSURE: 122 MMHG | HEART RATE: 69 BPM | WEIGHT: 181 LBS

## 2020-08-28 DIAGNOSIS — Z43.1 ENCOUNTER FOR ATTENTION TO GASTROSTOMY: ICD-10-CM

## 2020-08-28 PROCEDURE — 43762 RPLC GTUBE NO REVJ TRC: CPT | Performed by: NURSE PRACTITIONER

## 2020-09-01 ENCOUNTER — OFFICE VISIT (OUTPATIENT)
Dept: WOUND CARE | Facility: HOSPITAL | Age: 85
End: 2020-09-01

## 2020-09-01 PROCEDURE — G0463 HOSPITAL OUTPT CLINIC VISIT: HCPCS

## 2020-09-15 ENCOUNTER — APPOINTMENT (OUTPATIENT)
Dept: WOUND CARE | Facility: HOSPITAL | Age: 85
End: 2020-09-15

## 2020-09-22 ENCOUNTER — APPOINTMENT (OUTPATIENT)
Dept: WOUND CARE | Facility: HOSPITAL | Age: 85
End: 2020-09-22

## (undated) DEVICE — PK ENDO GI 50

## (undated) DEVICE — PERCUTANEOUS ENDOSCOPIC GASTROSTOMY KIT: Brand: ENDOVIVE SAFETY PEG KIT

## (undated) DEVICE — BITEBLOCK ENDO W/STRAP 60F A/ LF DISP

## (undated) DEVICE — GLV SURG TRIUMPH LT PF LTX 8 STRL

## (undated) DEVICE — ABDOMINAL BINDER: Brand: DEROYAL